# Patient Record
Sex: FEMALE | Race: WHITE | Employment: OTHER | URBAN - METROPOLITAN AREA
[De-identification: names, ages, dates, MRNs, and addresses within clinical notes are randomized per-mention and may not be internally consistent; named-entity substitution may affect disease eponyms.]

---

## 2018-08-23 ENCOUNTER — HOSPITAL ENCOUNTER (EMERGENCY)
Age: 73
Discharge: HOME | End: 2018-08-23
Attending: EMERGENCY MEDICINE
Payer: MEDICARE

## 2018-08-23 ENCOUNTER — APPOINTMENT (OUTPATIENT)
Dept: GENERAL RADIOLOGY | Age: 73
End: 2018-08-23
Attending: EMERGENCY MEDICINE
Payer: MEDICARE

## 2018-08-23 VITALS
DIASTOLIC BLOOD PRESSURE: 51 MMHG | HEIGHT: 64 IN | HEART RATE: 65 BPM | TEMPERATURE: 98.5 F | OXYGEN SATURATION: 98 % | SYSTOLIC BLOOD PRESSURE: 117 MMHG | RESPIRATION RATE: 21 BRPM | WEIGHT: 153 LBS | BODY MASS INDEX: 26.12 KG/M2

## 2018-08-23 DIAGNOSIS — G89.29 OTHER CHRONIC PAIN: ICD-10-CM

## 2018-08-23 DIAGNOSIS — R06.02 SHORTNESS OF BREATH: Primary | ICD-10-CM

## 2018-08-23 LAB
ALBUMIN SERPL-MCNC: 3.8 G/DL (ref 3.5–5)
ALBUMIN/GLOB SERPL: 1.2 {RATIO} (ref 1.1–2.2)
ALP SERPL-CCNC: 86 U/L (ref 45–117)
ALT SERPL-CCNC: 37 U/L (ref 12–78)
ANION GAP SERPL CALC-SCNC: 7 MMOL/L (ref 5–15)
AST SERPL-CCNC: 47 U/L (ref 15–37)
ATRIAL RATE: 58 BPM
BASOPHILS # BLD: 0 K/UL (ref 0–0.1)
BASOPHILS NFR BLD: 1 % (ref 0–1)
BILIRUB SERPL-MCNC: 0.7 MG/DL (ref 0.2–1)
BNP SERPL-MCNC: 217 PG/ML (ref 0–450)
BUN SERPL-MCNC: 25 MG/DL (ref 6–20)
BUN/CREAT SERPL: 19 (ref 12–20)
CALCIUM SERPL-MCNC: 8.8 MG/DL (ref 8.5–10.1)
CALCULATED P AXIS, ECG09: -10 DEGREES
CALCULATED R AXIS, ECG10: 36 DEGREES
CALCULATED T AXIS, ECG11: 14 DEGREES
CHLORIDE SERPL-SCNC: 102 MMOL/L (ref 97–108)
CO2 SERPL-SCNC: 26 MMOL/L (ref 21–32)
CREAT SERPL-MCNC: 1.31 MG/DL (ref 0.55–1.02)
DIAGNOSIS, 93000: NORMAL
DIFFERENTIAL METHOD BLD: ABNORMAL
EOSINOPHIL # BLD: 0.2 K/UL (ref 0–0.4)
EOSINOPHIL NFR BLD: 3 % (ref 0–7)
ERYTHROCYTE [DISTWIDTH] IN BLOOD BY AUTOMATED COUNT: 13.9 % (ref 11.5–14.5)
GLOBULIN SER CALC-MCNC: 3.2 G/DL (ref 2–4)
GLUCOSE SERPL-MCNC: 213 MG/DL (ref 65–100)
HCT VFR BLD AUTO: 28.2 % (ref 35–47)
HGB BLD-MCNC: 9.3 G/DL (ref 11.5–16)
IMM GRANULOCYTES # BLD: 0 K/UL (ref 0–0.04)
IMM GRANULOCYTES NFR BLD AUTO: 1 % (ref 0–0.5)
LYMPHOCYTES # BLD: 0.8 K/UL (ref 0.8–3.5)
LYMPHOCYTES NFR BLD: 16 % (ref 12–49)
MCH RBC QN AUTO: 29.6 PG (ref 26–34)
MCHC RBC AUTO-ENTMCNC: 33 G/DL (ref 30–36.5)
MCV RBC AUTO: 89.8 FL (ref 80–99)
MONOCYTES # BLD: 0.6 K/UL (ref 0–1)
MONOCYTES NFR BLD: 12 % (ref 5–13)
NEUTS SEG # BLD: 3.6 K/UL (ref 1.8–8)
NEUTS SEG NFR BLD: 68 % (ref 32–75)
NRBC # BLD: 0 K/UL (ref 0–0.01)
NRBC BLD-RTO: 0 PER 100 WBC
P-R INTERVAL, ECG05: 166 MS
PLATELET # BLD AUTO: 132 K/UL (ref 150–400)
PMV BLD AUTO: 11.4 FL (ref 8.9–12.9)
POTASSIUM SERPL-SCNC: 4.3 MMOL/L (ref 3.5–5.1)
PROT SERPL-MCNC: 7 G/DL (ref 6.4–8.2)
Q-T INTERVAL, ECG07: 412 MS
QRS DURATION, ECG06: 92 MS
QTC CALCULATION (BEZET), ECG08: 404 MS
RBC # BLD AUTO: 3.14 M/UL (ref 3.8–5.2)
SODIUM SERPL-SCNC: 135 MMOL/L (ref 136–145)
TROPONIN I SERPL-MCNC: 0.05 NG/ML
TROPONIN I SERPL-MCNC: <0.05 NG/ML
VENTRICULAR RATE, ECG03: 58 BPM
WBC # BLD AUTO: 5.3 K/UL (ref 3.6–11)

## 2018-08-23 PROCEDURE — 85025 COMPLETE CBC W/AUTO DIFF WBC: CPT | Performed by: EMERGENCY MEDICINE

## 2018-08-23 PROCEDURE — 93005 ELECTROCARDIOGRAM TRACING: CPT

## 2018-08-23 PROCEDURE — 84484 ASSAY OF TROPONIN QUANT: CPT | Performed by: EMERGENCY MEDICINE

## 2018-08-23 PROCEDURE — 83880 ASSAY OF NATRIURETIC PEPTIDE: CPT | Performed by: EMERGENCY MEDICINE

## 2018-08-23 PROCEDURE — 80053 COMPREHEN METABOLIC PANEL: CPT | Performed by: EMERGENCY MEDICINE

## 2018-08-23 PROCEDURE — 99285 EMERGENCY DEPT VISIT HI MDM: CPT

## 2018-08-23 PROCEDURE — 36415 COLL VENOUS BLD VENIPUNCTURE: CPT | Performed by: EMERGENCY MEDICINE

## 2018-08-23 PROCEDURE — 71046 X-RAY EXAM CHEST 2 VIEWS: CPT

## 2018-08-23 NOTE — ED NOTES
Bedside and Verbal shift change report given to [address] (oncoming nurse) by Gely Arzate RN (offgoing nurse). Report included the following information SBAR, Kardex, ED Summary, Intake/Output, MAR, Recent Results and Med Rec Status.

## 2018-08-23 NOTE — ED NOTES
This RN is signing up for the patient as the secondary nurse. If anything arises with the patient this RN will tell the primary nurse, physician, and write a note.

## 2018-08-23 NOTE — ED PROVIDER NOTES
EMERGENCY DEPARTMENT HISTORY AND PHYSICAL EXAM      Date: 8/23/2018  Patient Name: Mackenzie Jain    History of Presenting Illness     Chief Complaint   Patient presents with    Shortness of Breath     x 2 weeks. arrives via ems. History Provided By: Patient    HPI: Mackenzie Jain, 74 y.o. female with PMHx significant for CAD, DM, HTN, presents via EMS to the ED with cc of intermittent episodes of SOB x 2 weeks. Pt also c/o chronic neck and back pain which she feels is contributing to her shortness of breath. Pt reports chronic pain is due to arthritis. Pt reports recently being taken off of oxycodone recently by PCP and states she is now in constant chronic pain. Pt is currently taking 2 tablets of aleve daily x symptoms with signs of relief. Pt specifically states she is currently not experiencing SOB. Pt also states she is concerned of UTI, due to dysuria and \"milky\" colored urine. Pt also mentions endorsing 2 tablets of Lasix daily. Pt denies taking anticoagulants daily. Pt denies any exacerbating and alleviating factors. Pt denies any signs of CP, abdominal pain, fever, chills, N/V/D, chills, HA, estrogen use and any other associated symptoms. Chief Complaint: SOB  Duration: 2 weeks   Timing:  Intermittent  Location: N/A  Quality: N/A  Severity: N/A  Modifying Factors: Denies any exacerbating and alleviating factors. Associated Symptoms: Associated chronic neck, dysuria, back pain, but denies any other associated signs or symptoms     There are no other complaints, changes, or physical findings at this time.     PCP: Jorge Posey MD    Current Outpatient Prescriptions   Medication Sig Dispense Refill    glipiZIDE (GLUCOTROL) 5 mg tablet Take 1 pill twice daily for 7 days then resume old regimen of 1 tab daily 30 Tab 0    furosemide (LASIX) 20 mg tablet 1 tab every other day as needed as per cardiology 30 Tab 0    predniSONE (DELTASONE) 10 mg tablet Take 4 tabs daily for 3 days then  Take 3 tabs daily for 3 days then  Take 2 tabs daily for 3 days then  Take 1 tab daily for 3 days then STOP 30 Tab 0    Blood-Glucose Meter monitoring kit With testing strips & lancets 1 month supply 1 Kit 0    indomethacin (INDOCIN) 50 mg capsule Take 50 mg by mouth three (3) times daily as needed.  conjugated estrogens (PREMARIN) 0.625 mg/gram vaginal cream Insert 0.5 g into vagina daily as needed.  gemfibrozil (LOPID) 600 mg tablet Take 600 mg by mouth two (2) times a day.  nebivolol (BYSTOLIC) 5 mg tablet Take 5 mg by mouth daily.  cyclobenzaprine (FLEXERIL) 5 mg tablet Take 5 mg by mouth three (3) times daily as needed for Muscle Spasm(s).  naproxen (NAPROSYN) 500 mg tablet Take 500 mg by mouth two (2) times daily (with meals).  digoxin (LANOXIN) 0.25 mg tablet Take 0.25 mg by mouth daily.  diltiazem hcl 180 mg Tb24 Take 1 Tab by mouth daily.  HYDROcodone-acetaminophen (NORCO) 5-300 mg tablet Take 1 Tab by mouth every eight (8) hours as needed.  sitaGLIPtin (JANUVIA) 100 mg tablet Take 100 mg by mouth daily.  krill-omega-3-dha-epa-lipids (KRILL OIL) 094-14-41-25 mg cap Take 1 Cap by mouth daily.  buPROPion SR (WELLBUTRIN SR) 150 mg SR tablet Take 150 mg by mouth two (2) times a day.  aspirin 81 mg tablet Take 81 mg by mouth daily.  hydrOXYzine (ATARAX) 25 mg tablet Take 1 Tab by mouth every six (6) hours as needed for Anxiety. 20 Tab 0    triamcinolone acetonide (KENALOG) 0.1 % topical cream Apply  to affected area two (2) times a day. use thin layer       mometasone (ASMANEX TWISTHALER) 220 mcg (30 doses) inhalation capsule Take 1 Cap by inhalation two (2) times a day.  montelukast (SINGULAIR) 10 mg tablet Take 10 mg by mouth nightly.          Past History     Past Medical History:  Past Medical History:   Diagnosis Date    Asthma     CAD (coronary artery disease)     Diabetes ([facility] [address]     Hypertension     Other ill-defined conditions(799.89)     heart palpitations    Psychiatric disorder     depression       Past Surgical History:  Past Surgical History:   Procedure Laterality Date    HX GYN      hysterectomy    HX HEENT      tonsillectomy    HX OTHER SURGICAL      foot left       Family History:  History reviewed. No pertinent family history. Social History:  Social History   Substance Use Topics    Smoking status: Never Smoker    Smokeless tobacco: Never Used    Alcohol use No       Allergies: Allergies   Allergen Reactions    Albuterol Palpitations    Bactrim [Sulfamethoxazole-Trimethoprim] Not Reported This Time    Iodine Unknown (comments)    Shellfish Containing Products Unknown (comments)         Review of Systems   Review of Systems   Constitutional: Negative for chills, fatigue and fever. HENT: Negative for congestion, rhinorrhea and sore throat. Eyes: Negative for pain, discharge and visual disturbance. Respiratory: Positive for shortness of breath. Negative for cough, chest tightness and wheezing. Cardiovascular: Negative for chest pain, palpitations and leg swelling. Gastrointestinal: Negative for abdominal pain, constipation, diarrhea, nausea and vomiting. Genitourinary: Positive for dysuria. Negative for frequency and hematuria. Musculoskeletal: Positive for back pain and neck pain. Negative for arthralgias and myalgias. Skin: Negative for rash. Neurological: Negative for dizziness, weakness, light-headedness and headaches. Psychiatric/Behavioral: Negative. Physical Exam   Physical Exam   Constitutional: She is oriented to person, place, and time. She appears well-developed and well-nourished. No distress. HENT:   Head: Normocephalic and atraumatic. Eyes: EOM are normal. Right eye exhibits no discharge. Left eye exhibits no discharge. No scleral icterus. Neck: Normal range of motion. Neck supple. No tracheal deviation present.    Cardiovascular: Normal rate, regular rhythm, normal heart sounds and intact distal pulses. Exam reveals no gallop and no friction rub. No murmur heard. Pulses:       Dorsalis pedis pulses are 2+ on the right side, and 2+ on the left side. Posterior tibial pulses are 2+ on the right side, and 2+ on the left side. Pulmonary/Chest: Effort normal and breath sounds normal. No respiratory distress. She has no wheezes. She has no rales. Abdominal: Soft. She exhibits no distension. There is no tenderness. Musculoskeletal: Normal range of motion. She exhibits edema. 1+ B/L LE edema   Lymphadenopathy:     She has no cervical adenopathy. Neurological: She is alert and oriented to person, place, and time. No focal neuro deficits   Skin: Skin is warm and dry. No rash noted. Psychiatric: She has a normal mood and affect. Nursing note and vitals reviewed.       Diagnostic Study Results     Labs -     Recent Results (from the past 12 hour(s))   EKG, 12 LEAD, INITIAL    Collection Time: 08/23/18 12:52 PM   Result Value Ref Range    Ventricular Rate 58 BPM    Atrial Rate 58 BPM    P-R Interval 166 ms    QRS Duration 92 ms    Q-T Interval 412 ms    QTC Calculation (Bezet) 404 ms    Calculated P Axis -10 degrees    Calculated R Axis 36 degrees    Calculated T Axis 14 degrees    Diagnosis       Sinus bradycardia  When compared with ECG of 23-DEC-2013 17:12,  No significant change was found  Confirmed by Matt Hinds ([id number]) on 8/23/2018 0:06:74 PM     METABOLIC PANEL, COMPREHENSIVE    Collection Time: 08/23/18  1:51 PM   Result Value Ref Range    Sodium 135 (L) 136 - 145 mmol/L    Potassium 4.3 3.5 - 5.1 mmol/L    Chloride 102 97 - 108 mmol/L    CO2 26 21 - 32 mmol/L    Anion gap 7 5 - 15 mmol/L    Glucose 213 (H) 65 - 100 mg/dL    BUN 25 (H) 6 - 20 MG/DL    Creatinine 1.31 (H) 0.55 - 1.02 MG/DL    BUN/Creatinine ratio 19 12 - 20      GFR est AA 48 (L) >60 ml/min/1.73m2    GFR est non-AA 40 (L) >60 ml/min/1.73m2    Calcium 8.8 8.5 - 10.1 MG/DL Bilirubin, total 0.7 0.2 - 1.0 MG/DL    ALT (SGPT) 37 12 - 78 U/L    AST (SGOT) 47 (H) 15 - 37 U/L    Alk. phosphatase 86 45 - 117 U/L    Protein, total 7.0 6.4 - 8.2 g/dL    Albumin 3.8 3.5 - 5.0 g/dL    Globulin 3.2 2.0 - 4.0 g/dL    A-G Ratio 1.2 1.1 - 2.2     NT-PRO BNP    Collection Time: 08/23/18  1:51 PM   Result Value Ref Range    NT pro- 0 - 450 PG/ML   CBC WITH AUTOMATED DIFF    Collection Time: 08/23/18  1:51 PM   Result Value Ref Range    WBC 5.3 3.6 - 11.0 K/uL    RBC 3.14 (L) 3.80 - 5.20 M/uL    HGB 9.3 (L) 11.5 - 16.0 g/dL    HCT 28.2 (L) 35.0 - 47.0 %    MCV 89.8 80.0 - 99.0 FL    MCH 29.6 26.0 - 34.0 PG    MCHC 33.0 30.0 - 36.5 g/dL    RDW 13.9 11.5 - 14.5 %    PLATELET 184 (L) 775 - 400 K/uL    MPV 11.4 8.9 - 12.9 FL    NRBC 0.0 0  WBC    ABSOLUTE NRBC 0.00 0.00 - 0.01 K/uL    NEUTROPHILS 68 32 - 75 %    LYMPHOCYTES 16 12 - 49 %    MONOCYTES 12 5 - 13 %    EOSINOPHILS 3 0 - 7 %    BASOPHILS 1 0 - 1 %    IMMATURE GRANULOCYTES 1 (H) 0.0 - 0.5 %    ABS. NEUTROPHILS 3.6 1.8 - 8.0 K/UL    ABS. LYMPHOCYTES 0.8 0.8 - 3.5 K/UL    ABS. MONOCYTES 0.6 0.0 - 1.0 K/UL    ABS. EOSINOPHILS 0.2 0.0 - 0.4 K/UL    ABS. BASOPHILS 0.0 0.0 - 0.1 K/UL    ABS. IMM.  GRANS. 0.0 0.00 - 0.04 K/UL    DF AUTOMATED     TROPONIN I    Collection Time: 08/23/18  1:51 PM   Result Value Ref Range    Troponin-I, Qt. 0.05 (H) <0.05 ng/mL   EKG, 12 LEAD, SUBSEQUENT    Collection Time: 08/23/18  4:03 PM   Result Value Ref Range    Ventricular Rate 60 BPM    Atrial Rate 60 BPM    P-R Interval 192 ms    QRS Duration 94 ms    Q-T Interval 422 ms    QTC Calculation (Bezet) 422 ms    Calculated P Axis 81 degrees    Calculated R Axis 6 degrees    Calculated T Axis 10 degrees    Diagnosis       Normal sinus rhythm  Normal ECG  When compared with ECG of 23-AUG-2018 12:52,  MANUAL COMPARISON REQUIRED, DATA IS UNCONFIRMED     TROPONIN I    Collection Time: 08/23/18  4:11 PM   Result Value Ref Range    Troponin-I, Qt. <0.05 <0.05 ng/mL       Radiologic Studies -     CXR Results  (Last 48 hours)               08/23/18 1400  XR CHEST PA LAT Final result    Impression:  IMPRESSION: Bronchitis versus asthma. No infiltrate                       Narrative:  EXAM:  XR CHEST PA LAT       INDICATION:   shortness of breath, intermittent, x 2 weeks       COMPARISON: 2010. FINDINGS: PA and lateral radiographs of the chest demonstrate no infiltrate. There is a stable linear scar along the left major fissure. Peribronchial   cuffing is present on the right. The cardiac and mediastinal contours and   pulmonary vascularity are remarkable for moderate tortuosity of the descending   aorta. Moderate spondylitic changes are present in the midthoracic spine. Medical Decision Making   I am the first provider for this patient. I reviewed the vital signs, available nursing notes, past medical history, past surgical history, family history and social history. Vital Signs-Reviewed the patient's vital signs. Patient Vitals for the past 12 hrs:   Temp Pulse Resp BP SpO2   08/23/18 1630 - 65 21 117/51 98 %   08/23/18 1600 - 62 22 (!) 112/34 98 %   08/23/18 1330 - (!) 55 15 123/40 99 %   08/23/18 1300 98.5 °F (36.9 °C) (!) 55 16 133/45 99 %       Pulse Oximetry Analysis - 99% on RA    Cardiac Monitor:   Rate: 58 bpm  Rhythm: Sinus Bradycardia     EKG interpretation: (Preliminary) 1252  Rhythm: sinus bradycardia; and regular . Rate (approx.): 58 bpm; Axis: normal; NH interval: normal; QRS interval: normal ; ST/T wave: normal; Other findings: No other findings. Written by JAMES Du, as dictated by Nivia Miller MD.    Repeated EKG interpretation: (Preliminary) 3803  Rhythm: Normal Sinus Rhythm; and regular . Rate (approx.): 60 bpm; Axis: normal; NH interval: normal; QRS interval: normal ; ST/T wave: normal; Other findings: No other findings.   Written by JAMES Du, as dictated by Luisa De León Rory Calloway MD.    Records Reviewed: Nursing Notes, Old Medical Records, Previous electrocardiograms, Ambulance Run Sheet, Previous Radiology Studies and Previous Laboratory Studies    Provider Notes (Medical Decision Making):   DDx: Medication side effect, heart failure, pleural effusion, PNA, ACS, UTI, anxiety, PE    Disposition: Patient is a 75 yo female who presents to ED with intermittent dyspnea x 2 weeks. She reports recently being taken off of pain medication and feels her pain is contributing to her dyspnea. She denies chest pain and current dyspnea. CXR negative for pulmonary edema, pna. Troponin negative x 2. Low suspicion for PE (Well's score 0). Vital signs stable. Will discharge with PCP follow up. ED Course:   Initial assessment performed. The patients presenting problems have been discussed, and they are in agreement with the care plan formulated and outlined with them. I have encouraged them to ask questions as they arise throughout their visit. PROGRESS NOTE:  5:30 PM  Patient reports she is feeling better, denies current dyspnea and CP. VSS. Will discharge with PCP follow up. Discussed results, prescriptions and follow up plan with patient. Provided customary return to ED instructions. Patient expressed understanding. Esvin Alvarado MD    Critical Care Time:   0 minutes    Disposition:  Discharge Note:  5:32 PM  The pt is ready for discharge. The pt's signs, symptoms, diagnosis, and discharge instructions have been discussed and pt has conveyed their understanding. The pt is to follow up as recommended or return to ER should their symptoms worsen. Plan has been discussed and pt is in agreement. PLAN:  1. Current Discharge Medication List        2.    Follow-up Information     Follow up With Details Comments Contact Info    Miguel Pelayo MD In 2 days  [address] [place] [address] [address] [phone]      [facility] EMERGENCY DEPT  As needed, If symptoms worsen [address] [facility]  [phone]        Return to ED if worse     Diagnosis     Clinical Impression:   1. Shortness of breath    2. Other chronic pain        Attestations: This note is prepared by Rony Mejia, acting as Scribe for Corina Almanzar MD.    Corina Almanzar MD: The scribe's documentation has been prepared under my direction and personally reviewed by me in its entirety.  I confirm that the note above accurately reflects all work, treatment, procedures, and medical decision making performed by me

## 2018-08-23 NOTE — DISCHARGE INSTRUCTIONS

## 2018-08-23 NOTE — ED NOTES
Assumed care from triage. Patient comes to the ED after being short of breath this morning, but states that she believes she had a panic attack. Patient has no complaints except her arthritis is acting up badly.

## 2018-08-24 LAB
ATRIAL RATE: 60 BPM
CALCULATED P AXIS, ECG09: 81 DEGREES
CALCULATED R AXIS, ECG10: 6 DEGREES
CALCULATED T AXIS, ECG11: 10 DEGREES
DIAGNOSIS, 93000: NORMAL
P-R INTERVAL, ECG05: 192 MS
Q-T INTERVAL, ECG07: 422 MS
QRS DURATION, ECG06: 94 MS
QTC CALCULATION (BEZET), ECG08: 422 MS
VENTRICULAR RATE, ECG03: 60 BPM

## 2018-10-03 ENCOUNTER — HOSPITAL ENCOUNTER (OUTPATIENT)
Dept: NON INVASIVE DIAGNOSTICS | Age: 73
Discharge: HOME | End: 2018-10-03
Attending: INTERNAL MEDICINE
Payer: MEDICARE

## 2018-10-03 DIAGNOSIS — R55 SYNCOPE: ICD-10-CM

## 2018-10-03 PROCEDURE — 93225 XTRNL ECG REC<48 HRS REC: CPT

## 2018-10-05 NOTE — PROCEDURES
[address] [facility]    Gunnar Vela  MR#: [id number]  : 1945  ACCOUNT #: [de-identified]   DATE OF SERVICE: 10/03/2018    PROCEDURE:  A 24-hour Holter monitor. INDICATION:  Syncope. A 24-hour Holter monitor was carried out between 10/03 and 10/04. During the recording the patient had 4 premature ventricular contractions and several atrial premature contractions, no pauses. Minimum heart rate 46 beats per minute, maximum heart rate 108 beats per minute. Sinus bradycardia was observed with rates of 46. The diary was returned and during the recording the patient recorded no symptoms. SUMMARY: Holter monitor done for complaint of syncope reveals the followin. Regular sinus rhythm. 2.  Rare premature ventricular contractions. 3.  Several atrial premature contractions. 4.  No significant pauses. 5.  No tachycardia.       Miles Fiscehr MD       SCT / SN  D: 10/04/2018 16:06     T: 10/04/2018 21:43  JOB #: [id number]  CC: Rocky Franco MD  CC: Danica Anderson MD

## 2018-12-07 ENCOUNTER — OFFICE VISIT (OUTPATIENT)
Dept: NEUROLOGY | Age: 73
End: 2018-12-07

## 2018-12-07 VITALS
OXYGEN SATURATION: 95 % | HEART RATE: 81 BPM | SYSTOLIC BLOOD PRESSURE: 110 MMHG | DIASTOLIC BLOOD PRESSURE: 54 MMHG | BODY MASS INDEX: 24.03 KG/M2 | WEIGHT: 140 LBS

## 2018-12-07 DIAGNOSIS — M54.12 CERVICAL RADICULOPATHY: Primary | ICD-10-CM

## 2018-12-07 DIAGNOSIS — G25.0 ESSENTIAL TREMOR: ICD-10-CM

## 2018-12-07 RX ORDER — LOSARTAN POTASSIUM 50 MG/1
25 TABLET ORAL DAILY
COMMUNITY

## 2018-12-07 RX ORDER — METHOCARBAMOL 750 MG/1
TABLET, FILM COATED ORAL
Refills: 1 | COMMUNITY
Start: 2018-10-23 | End: 2020-08-10

## 2018-12-07 NOTE — PROGRESS NOTES
NEUROLOGY CLINIC NOTE    Patient ID:  Rachael Mckeon  [id number]  89 y.o.  1945    Date of Consultation:  December 7, 2018    Reason for Consultation:  Left neck, arm and shoulder    Chief Complaint   Patient presents with    New Patient    Arm Pain     left        History of Present Illness:     Patient Active Problem List    Diagnosis Date Noted    CHF (congestive heart failure) ([facility] [address] 12/23/2013     Past Medical History:   Diagnosis Date    Asthma     CAD (coronary artery disease)     Diabetes ([facility] [address]     Hypertension     Other ill-defined conditions(799.89)     heart palpitations    Psychiatric disorder     depression      Past Surgical History:   Procedure Laterality Date    HX GYN      hysterectomy    HX HEENT      tonsillectomy    HX OTHER SURGICAL      foot left      Prior to Admission medications    Medication Sig Start Date End Date Taking? Authorizing Provider   losartan (COZAAR) 50 mg tablet Take  by mouth daily. Yes Provider, Historical   methocarbamol (ROBAXIN) 750 mg tablet TAKE 1 TABLET BY MOUTH 3 TIMES A DAY AS NEEDED FOR SPASMS 10/23/18  Yes Provider, Historical   glipiZIDE (GLUCOTROL) 5 mg tablet Take 1 pill twice daily for 7 days then resume old regimen of 1 tab daily 12/26/13  Yes Camilo MONTIEL MD   furosemide (LASIX) 20 mg tablet 1 tab every other day as needed as per cardiology 12/26/13  Yes Edgardo Hernandez MD   Blood-Glucose Meter monitoring kit With testing strips & lancets 1 month supply 12/26/13  Yes Edgardo Hernandez MD   gemfibrozil (LOPID) 600 mg tablet Take 600 mg by mouth two (2) times a day. Yes Other, MD Junior   nebivolol (BYSTOLIC) 5 mg tablet Take 5 mg by mouth daily. Yes Other, MD Junior   diltiazem hcl 180 mg Tb24 Take 1 Tab by mouth daily. Yes Provider, Historical   sitaGLIPtin (JANUVIA) 100 mg tablet Take 100 mg by mouth daily. Yes Provider, Historical   buPROPion SR (WELLBUTRIN SR) 150 mg SR tablet Take 150 mg by mouth two (2) times a day. Yes Junior Mcconnell MD   mometasone (ASMANEX TWISTHALER) 220 mcg (30 doses) inhalation capsule Take 1 Cap by inhalation two (2) times a day. Yes Junior Mcconnell MD   montelukast (SINGULAIR) 10 mg tablet Take 10 mg by mouth nightly. Yes Junior Mcconnell MD   predniSONE (DELTASONE) 10 mg tablet Take 4 tabs daily for 3 days then  Take 3 tabs daily for 3 days then  Take 2 tabs daily for 3 days then  Take 1 tab daily for 3 days then STOP 12/26/13   Guevara Jones MD   indomethacin (INDOCIN) 50 mg capsule Take 50 mg by mouth three (3) times daily as needed. Junior Mcconnell MD   conjugated estrogens (PREMARIN) 0.625 mg/gram vaginal cream Insert 0.5 g into vagina daily as needed. Junior Mcconnell MD   cyclobenzaprine (FLEXERIL) 5 mg tablet Take 5 mg by mouth three (3) times daily as needed for Muscle Spasm(s). Junior Mcconnell MD   naproxen (NAPROSYN) 500 mg tablet Take 500 mg by mouth two (2) times daily (with meals). Junior Mcconnell MD   digoxin (LANOXIN) 0.25 mg tablet Take 0.25 mg by mouth daily. Provider, Historical   HYDROcodone-acetaminophen (NORCO) 5-300 mg tablet Take 1 Tab by mouth every eight (8) hours as needed. Provider, Historical   krill-omega-3-dha-epa-lipids (KRILL OIL) 599-39-11-50 mg cap Take 1 Cap by mouth daily. Provider, Historical   aspirin 81 mg tablet Take 81 mg by mouth daily. Junior Mcconnell MD   hydrOXYzine (ATARAX) 25 mg tablet Take 1 Tab by mouth every six (6) hours as needed for Anxiety. 1/2/13   Benita Mccabe MD   triamcinolone acetonide (KENALOG) 0.1 % topical cream Apply  to affected area two (2) times a day.  use thin layer     Junior Mcconnell MD     Allergies   Allergen Reactions    Albuterol Palpitations    Bactrim [Sulfamethoxazole-Trimethoprim] Not Reported This Time    Iodine Unknown (comments)    Shellfish Containing Products Unknown (comments)      Social History     Tobacco Use    Smoking status: Never Smoker    Smokeless tobacco: Never Used   Substance Use Topics    Alcohol use: No      Family History   Problem Relation Age of Onset    Cancer Father     Stroke Father     Heart Disease Paternal Grandfather        Subjective:      Shira Tyson is a 74 y.o. [facility] with a history of diabetes, hypertension, depression, CAD, congestive heart failure and asthma who is here for further evaluation of her chronic left neck and arm pain. Per patient and  condition is been ongoing for the past 3-4 months. No known triggers. Abrupt onset of left neck and shoulder pain that radiates down the left arm. Mainly involving the left upper arm around the deltoid area and anterior forearm. It does not radiate down to the hands. Sharp pain and at its worst can be an 8/10. It can last for several minutes to half an hour. Looking down or flexing her head towards her chest precipitates the pain at times. No worsening factors. Lifting her arm above the shoulder seems to help. No overt weakness. No loss of muscle block. Occurs every day but she still has pain free periods. Patient takes Tylenol with no clear benefit. Methocarbamol 750 mg 3 times daily offers no benefit as well. Oxycodone offers relief. Patient also mentions history of tremors for years. Mainly involving the hands. Not any worse. Does not affect her activities of daily living. Review of outside records reveal:  Patient was seen by Dr. Megan Fan (orthopaedic) last 6/15/2017 for neck and back pain. Diagnosed with cervicalgia, thoracic and cervical spondylosis, right sciatica. Patient was given home exercise program.  Patient was also prescribed tizanidine 4 mg every 8 hours as needed for muscle spasms. Patient was last seen 9/26/2018 for neck and lower back pain. Note mentions mainly right-sided back pain. Patient was treated with physical therapy, chiropractic manipulation and pain medication. 9/10 pain. History of falls.   Cervical spine x-ray done 9/26/2018 revealed mild degenerative changes throughout with severe disc collapse at C5-6. No fracture or dislocation. Lumbar x-ray done 9/26/2018 revealed degenerative changes throughout with asymmetric disc collapse at L3-4 and L4-5. There is a grade 1 spondylolisthesis at L4-5 that appears to be stable. There is mild compression deformity at L1 age indeterminate but new since December 2016. Dr. Shukri Pratt discussed kyphoplasty as well as obtaining an MRI. Patient was also provided a list of pain management specialist.    Outside reports reviewed: office notes, radiology reports. Review of Systems:    A comprehensive review of systems was performed:   Constitutional: positive for fatigue   Eyes: positive for none  Ears, nose, mouth, throat, and face: positive for none  Respiratory: positive for shortness of breath   Cardiovascular: positive for none  Gastrointestinal: positive for none  Genitourinary: positive for none  Integument/breast: positive for none  Hematologic/lymphatic: positive for none  Musculoskeletal: positive for joint pain, myalgia, weakness   Neurological: positive for falls   Behavioral/Psych: positive for none  Endocrine: positive for none  Allergic/Immunologic: positive for none      Objective:     Visit Vitals  /54   Pulse 81   Wt 140 lb (63.5 kg)   SpO2 95%   BMI 24.03 kg/m²     3/10 left arm pain    PHYSICAL EXAM:    General Appearance: Alert, patient appears stated age. General:  Well developed, well nourished, patient in no apparent distress. Head/Face: The head is normocephalic and atraumatic. Eyes: Conjunctivae appear normal. Sclera appear normal and non-icteric. Nose (and Sinus):   No abnormality of the nose or sinuses is noted. Oral:   Throat is clear. Lymphatics:  No lymphadenopathy in the neck/head. Neck and Thyroid:   No bruits noted in the neck. Respiratory:  Lungs clear to auscultation. Cardiovascular:  Palpation and auscultation: regular rate and rhythm.    Extremity: No joint swelling, erythema or pedal edema. NEUROLOGICAL EXAM:    Appearance: The patient is well developed, well nourished, provides a coherent history and is in no acute distress. Mental Status: Oriented to time, place and person. Fluent, no aphasia or dysarthria. Mood and affect appropriate. Cranial Nerves:   Intact visual fields. ANDRA, EOM's full, no nystagmus, no ptosis. Facial sensation is normal. Corneal reflexes are intact. Facial movement is symmetric. Hearing is normal bilaterally. Palate is midline with normal elevation. Sternocleidomastoid and trapezius muscles are normal. Tongue is midline. Motor:  5/5 strength in upper and lower proximal and distal muscles. Normal bulk and tone. No fasciculations. No pronator drift. No cogwheel rigidity. Reflexes:   Deep tendon reflexes 1+/4 and symmetrical. Downgoing toes. Sensory:   Normal to cold, pinprick and vibration except decrease PP bilateral C5 dermatome and decrease cold and vibration right foot with intact PP. Gait:  Steady. No Romberg. Tremor:   Mild intentional and postural UE tremors noted. No resting pill rolling tremors. No bradykinesia. Cerebellar:  Intact FTN/JESSICA/HTS. Neurovascular:  No carotid bruits. Imaging  Cervical and lumbar x-rays    Assessment:   Cervical radiculopathy  Essential tremors    Plan:   Neurological examination reveals decrease pinprick at bilateral C5 dermatomal levels. History and complaint is consistent with at least a left C5 to possibly C6 radiculopathy. Previous cervical x-ray revealed severe disc collapse at C5-C6. Cervical MRI without contrast was ordered to assess for severity of neuroforaminal narrowing and possible spinal stenosis at C5-C6 level. EMG/NCS of the left upper extremity was ordered to further assess extent of radiculopathy as well as presence of actual nerve or muscle damage. Further intervention will be done pending results of the testing.     Exam also reveals mild postural and intentional upper extremity tremors. No evidence of resting tremors, cogwheel rigidity, bradykinesia, masked faces, stooped posture or shuffling gait. No findings typically seen in Parkinson's disease. Findings are more consistent with essential or benign tremors. Patient was reassured. No treatment necessary at this time given that it does not affect her activities of daily living. Monitor for now. All questions and concerns were answered. Visit lasted 50 minutes.   Greater than 50% was spent reviewing her outside medical records from orthopedics as well as cervical and lumbar x-rays done, discussion about her condition, etiology and prognosis, need for further diagnostic testing to look for severity and possible surgical intervention, need for cervical MRI, need for EMG/NCS, refrain from any strenuous activity, fall precaution

## 2018-12-07 NOTE — PATIENT INSTRUCTIONS
A Healthy Lifestyle: Care Instructions  Your Care Instructions    A healthy lifestyle can help you feel good, stay at a healthy weight, and have plenty of energy for both work and play. A healthy lifestyle is something you can share with your whole family. A healthy lifestyle also can lower your risk for serious health problems, such as high blood pressure, heart disease, and diabetes. You can follow a few steps listed below to improve your health and the health of your family. Follow-up care is a key part of your treatment and safety. Be sure to make and go to all appointments, and call your doctor if you are having problems. It's also a good idea to know your test results and keep a list of the medicines you take. How can you care for yourself at home? · Do not eat too much sugar, fat, or fast foods. You can still have dessert and treats now and then. The goal is moderation. · Start small to improve your eating habits. Pay attention to portion sizes, drink less juice and soda pop, and eat more fruits and vegetables. ? Eat a healthy amount of food. A 3-ounce serving of meat, for example, is about the size of a deck of cards. Fill the rest of your plate with vegetables and whole grains. ? Limit the amount of soda and sports drinks you have every day. Drink more water when you are thirsty. ? Eat at least 5 servings of fruits and vegetables every day. It may seem like a lot, but it is not hard to reach this goal. A serving or helping is 1 piece of fruit, 1 cup of vegetables, or 2 cups of leafy, raw vegetables. Have an apple or some carrot sticks as an afternoon snack instead of a candy bar. Try to have fruits and/or vegetables at every meal.  · Make exercise part of your daily routine. You may want to start with simple activities, such as walking, bicycling, or slow swimming. Try to be active 30 to 60 minutes every day. You do not need to do all 30 to 60 minutes all at once.  For example, you can exercise 3 times a day for 10 or 20 minutes. Moderate exercise is safe for most people, but it is always a good idea to talk to your doctor before starting an exercise program.  · Keep moving. Amanda Penalozan the lawn, work in the garden, or [employer]. Take the stairs instead of the elevator at work. · If you smoke, quit. People who smoke have an increased risk for heart attack, stroke, cancer, and other lung illnesses. Quitting is hard, but there are ways to boost your chance of quitting tobacco for good. ? Use nicotine gum, patches, or lozenges. ? Ask your doctor about stop-smoking programs and medicines. ? Keep trying. In addition to reducing your risk of diseases in the future, you will notice some benefits soon after you stop using tobacco. If you have shortness of breath or asthma symptoms, they will likely get better within a few weeks after you quit. · Limit how much alcohol you drink. Moderate amounts of alcohol (up to 2 drinks a day for men, 1 drink a day for women) are okay. But drinking too much can lead to liver problems, high blood pressure, and other health problems. Family health  If you have a family, there are many things you can do together to improve your health. · Eat meals together as a family as often as possible. · Eat healthy foods. This includes fruits, vegetables, lean meats and dairy, and whole grains. · Include your family in your fitness plan. Most people think of activities such as jogging or tennis as the way to fitness, but there are many ways you and your family can be more active. Anything that makes you breathe hard and gets your heart pumping is exercise. Here are some tips:  ? Walk to do errands or to take your child to school or the bus.  ? Go for a family bike ride after dinner instead of watching TV. Where can you learn more? Go to http://eder-zuleika.info/. Enter O498 in the search box to learn more about \"A Healthy Lifestyle: Care Instructions. \"  Current as of: December 7, 2017  Content Version: 11.8  © 6359-8270 [employer]. Care instructions adapted under license by [employer] (which disclaims liability or warranty for this information). If you have questions about a medical condition or this instruction, always ask your healthcare professional. [address] any warranty or liability for your use of this information. Office Policies        Phone calls/patient messages:    · Please allow up to 24 hours for someone in the office to contact you about your call or message. Be mindful your provider may be out of the office or your message may require further review. We encourage you to use [employer] for your messages as this is a faster, more efficient way to communicate with our office           Medication Refills:    · Prescription medications require up to 48 business hours to process. We encourage you to use [employer] for your refills. · For controlled medications: Please allow up to 72 business hours to process. Certain medications may require you to  a written prescription at our office. · NO narcotic/controlled medications will be prescribed after 4pm Monday through Friday or on weekends                Please note our office is moving to a new location at the end of December 2018. It will be at:  [address] [place] [address] [address] [place] [address], [address] [place] [address]       Pinched Nerve in the Neck: Care Instructions  Your Care Instructions  A pinched nerve in the neck happens when a vertebra or disc in the upper part of your spine is damaged. This damage can happen because of an injury. Or it can just happen with age. The changes caused by the damage may put pressure on a nearby nerve root, pinching it. This causes symptoms such as sharp pain in your neck, shoulder, arm, hand, or back. You may also have tingling or numbness. Sometimes it makes your arm weaker.  The symptoms are usually worse when you turn your head or strain your neck. For many people, the symptoms get better over time and finally go away. Early treatment usually includes medicines for pain and swelling. Sometimes physical therapy and special exercises may help. Follow-up care is a key part of your treatment and safety. Be sure to make and go to all appointments, and call your doctor if you are having problems. It's also a good idea to know your test results and keep a list of the medicines you take. How can you care for yourself at home? · Be safe with medicines. Read and follow all instructions on the label. ? If the doctor gave you a prescription medicine for pain, take it as prescribed. ? If you are not taking a prescription pain medicine, ask your doctor if you can take an over-the-counter medicine. · Try using a heating pad on a low or medium setting for 15 to 20 minutes every 2 or 3 hours. Try a warm shower in place of one session with the heating pad. You can also buy single-use heat wraps that last up to 8 hours. · You can also try an ice pack for 10 to 15 minutes every 2 to 3 hours. There isn't strong evidence that either heat or ice will help. But you can try them to see if they help you. · Don't spend too long in one position. Take short breaks to move around and change positions. · Wear a seat belt and shoulder harness when you are in a car. · Sleep with a pillow under your head and neck that keeps your neck straight. · If you were given a neck brace (cervical collar) to limit neck motion, wear it as instructed for as many days as your doctor tells you to. Do not wear it longer than you were told to. Wearing a brace for too long can lead to neck stiffness and can weaken the neck muscles. · Follow your doctor's instructions for gentle neck-stretching exercises. · Do not smoke. Smoking can slow healing of your discs. If you need help quitting, talk to your doctor about stop-smoking programs and medicines.  These can increase your chances of quitting for good. · Avoid strenuous work or exercise until your doctor says it is okay. When should you call for help? Call 911 anytime you think you may need emergency care. For example, call if:    · You are unable to move an arm or a leg at all.   [facility] your doctor now or seek immediate medical care if:    · You have new or worse symptoms in your arms, legs, chest, belly, or buttocks. Symptoms may include:  ? Numbness or tingling. ? Weakness. ? Pain.     · You lose bladder or bowel control.    Watch closely for changes in your health, and be sure to contact your doctor if:    · You are not getting better as expected. Where can you learn more? Go to http://eder-zuleika.info/. Enter Z890 in the search box to learn more about \"Pinched Nerve in the Neck: Care Instructions. \"  Current as of: November 29, 2017  Content Version: 11.8  © 2723-4550 [employer]. Care instructions adapted under license by [employer] (which disclaims liability or warranty for this information). If you have questions about a medical condition or this instruction, always ask your healthcare professional. [address] any warranty or liability for your use of this information.

## 2018-12-07 NOTE — PROGRESS NOTES
Patient stated she's having severe pain for years. Patient stated she is having muscle spasm all over as well as left arm pain.

## 2018-12-11 ENCOUNTER — OFFICE VISIT (OUTPATIENT)
Dept: NEUROLOGY | Age: 73
End: 2018-12-11

## 2018-12-11 VITALS
SYSTOLIC BLOOD PRESSURE: 104 MMHG | BODY MASS INDEX: 24.92 KG/M2 | HEIGHT: 64 IN | HEART RATE: 70 BPM | DIASTOLIC BLOOD PRESSURE: 42 MMHG | OXYGEN SATURATION: 98 % | WEIGHT: 146 LBS

## 2018-12-11 DIAGNOSIS — M54.12 CERVICAL RADICULOPATHY: Primary | ICD-10-CM

## 2018-12-11 RX ORDER — BACLOFEN 10 MG/1
10 TABLET ORAL 3 TIMES DAILY
Qty: 90 TAB | Refills: 0 | Status: SHIPPED | OUTPATIENT
Start: 2018-12-11 | End: 2019-01-07 | Stop reason: SDUPTHER

## 2018-12-11 RX ORDER — LORAZEPAM 1 MG/1
1 TABLET ORAL ONCE
Qty: 2 TAB | Refills: 0 | Status: SHIPPED | OUTPATIENT
Start: 2018-12-11 | End: 2018-12-11

## 2018-12-11 RX ORDER — LINACLOTIDE 145 UG/1
CAPSULE, GELATIN COATED ORAL
Refills: 0 | COMMUNITY
Start: 2018-11-16 | End: 2020-08-10

## 2018-12-11 RX ORDER — LEVALBUTEROL TARTRATE 45 UG/1
AEROSOL, METERED ORAL
Refills: 3 | COMMUNITY
Start: 2018-11-16

## 2018-12-11 NOTE — PROCEDURES
[facility] at [address] [place] [address]        Tel: [phone] ? Fax: [phone]    ELECTRODIAGNOSTIC REPORT      Test Date:  2018    Patient: Basilio Rosales : 1945 Physician: Yahir Piña   ID#: [id number] SEX: Female Ref. Phys: Yahir Piña     Patient History / Exam:  Left neck, shoulder and upper arm pain    EMG & NCV Findings:  Sensory and motor nerve conduction studies (as indicated in the tables) were within reference of normal.     Disposable concentric needle EMG (as indicated in the table) was normal except for irritability with mild neuropathic recruitment changes left deltoid and brachioradialis muscles. Mild denervation changes left mid cervical paraspinal muscle. Impression: This study is abnormal. There is electrodiagnostic evidence of a mild left C5 radiculopathy. There is no evidence of an entrapment neuropathy, generalized neuropathy or myopathy at this time. MRI correlate has already been ordered. Trial of Baclofen for symptomatic relief.     Yahir Piña MD    Nerve Conduction Studies  Anti Sensory Summary Table     Stim Site NR Peak (ms) Norm Peak (ms) P-T Amp (µV) Norm P-T Amp Site1 Site2 Dist (cm)   Left Median Anti Sensory (2nd Digit)   Wrist    3.9 <4 17.4 >13 Wrist 2nd Digit 14.0   Left Radial Anti Sensory (Base 1st Digit)   Wrist    2.2 <2.8 21.8 >11 Wrist Base 1st Digit 10.0   Left Ulnar Anti Sensory (5th Digit)   Wrist    3.6 <4.0 18.1 >9 Wrist 5th Digit 14.0     Motor Summary Table     Stim Site NR Onset (ms) Norm Onset (ms) O-P Amp (mV) Norm O-P Amp P-T Amp (mV) Site1 Site2 Dist (cm) Kiko (m/s)   Left Median Motor (Abd Poll Brev)   Wrist    3.8 <4.5 5.2 >4.1 9.0 Wrist Abd Poll Brev 8.0    Elbow    8.0  5.4  9.5 Elbow Wrist 21.5 51   Left Ulnar Motor (Abd Dig Minimi)   Wrist    2.7 <3.1 8.2 >7.0 15.0 Wrist Abd Dig Minimi 8.0    B Elbow    5.8  7.1  13.7 B Elbow Wrist 17.5 56   A Elbow    7.3  7.1  13.4 A Elbow B Elbow 10.0 67       EMG     Side Muscle Nerve Root Ins Act Fibs Psw Recrt Duration Amp Poly Comment   Left Deltoid Axillary C5-6 Incr Nml Nml Reduced Incr Incr 2+    Left Triceps Radial C6-7-8 Nml Nml Nml Nml Nml Nml Nml    Left Biceps Musculocut C5-6 Nml Nml Nml Nml Nml Nml Nml    Left BrachioRad Radial C5-6 Incr Nml Nml Reduced Incr Incr 2+    Left PronatorTeres Median C6-7 Nml Nml Nml Nml Nml Nml Nml    Left 1stDorInt Ulnar C8-T1 Nml Nml Nml Nml Nml Nml Nml    Left Lower Cerv Parasp Rami C7,T1 Nml Nml Nml Nml Nml Nml Nml    Left Mid Cerv Parasp Rami C5,6 Incr 1+ 1+ Nml Nml Nml Nml    Left Upper Cerv Parasp Rami C3,4 Nml Nml Nml Nml Nml Nml Nml      Waveforms:

## 2018-12-11 NOTE — PATIENT INSTRUCTIONS
A Healthy Lifestyle: Care Instructions  Your Care Instructions    A healthy lifestyle can help you feel good, stay at a healthy weight, and have plenty of energy for both work and play. A healthy lifestyle is something you can share with your whole family. A healthy lifestyle also can lower your risk for serious health problems, such as high blood pressure, heart disease, and diabetes. You can follow a few steps listed below to improve your health and the health of your family. Follow-up care is a key part of your treatment and safety. Be sure to make and go to all appointments, and call your doctor if you are having problems. It's also a good idea to know your test results and keep a list of the medicines you take. How can you care for yourself at home? · Do not eat too much sugar, fat, or fast foods. You can still have dessert and treats now and then. The goal is moderation. · Start small to improve your eating habits. Pay attention to portion sizes, drink less juice and soda pop, and eat more fruits and vegetables. ? Eat a healthy amount of food. A 3-ounce serving of meat, for example, is about the size of a deck of cards. Fill the rest of your plate with vegetables and whole grains. ? Limit the amount of soda and sports drinks you have every day. Drink more water when you are thirsty. ? Eat at least 5 servings of fruits and vegetables every day. It may seem like a lot, but it is not hard to reach this goal. A serving or helping is 1 piece of fruit, 1 cup of vegetables, or 2 cups of leafy, raw vegetables. Have an apple or some carrot sticks as an afternoon snack instead of a candy bar. Try to have fruits and/or vegetables at every meal.  · Make exercise part of your daily routine. You may want to start with simple activities, such as walking, bicycling, or slow swimming. Try to be active 30 to 60 minutes every day. You do not need to do all 30 to 60 minutes all at once.  For example, you can exercise 3 times a day for 10 or 20 minutes. Moderate exercise is safe for most people, but it is always a good idea to talk to your doctor before starting an exercise program.  · Keep moving. Darinel Bryantle the lawn, work in the garden, or [employer]. Take the stairs instead of the elevator at work. · If you smoke, quit. People who smoke have an increased risk for heart attack, stroke, cancer, and other lung illnesses. Quitting is hard, but there are ways to boost your chance of quitting tobacco for good. ? Use nicotine gum, patches, or lozenges. ? Ask your doctor about stop-smoking programs and medicines. ? Keep trying. In addition to reducing your risk of diseases in the future, you will notice some benefits soon after you stop using tobacco. If you have shortness of breath or asthma symptoms, they will likely get better within a few weeks after you quit. · Limit how much alcohol you drink. Moderate amounts of alcohol (up to 2 drinks a day for men, 1 drink a day for women) are okay. But drinking too much can lead to liver problems, high blood pressure, and other health problems. Family health  If you have a family, there are many things you can do together to improve your health. · Eat meals together as a family as often as possible. · Eat healthy foods. This includes fruits, vegetables, lean meats and dairy, and whole grains. · Include your family in your fitness plan. Most people think of activities such as jogging or tennis as the way to fitness, but there are many ways you and your family can be more active. Anything that makes you breathe hard and gets your heart pumping is exercise. Here are some tips:  ? Walk to do errands or to take your child to school or the bus.  ? Go for a family bike ride after dinner instead of watching TV. Where can you learn more? Go to http://eder-zuleika.info/. Enter S761 in the search box to learn more about \"A Healthy Lifestyle: Care Instructions. \"  Current as of: December 7, 2017  Content Version: 11.8  © 7022-3556 Healthwise, [employer]. Care instructions adapted under license by [employer] (which disclaims liability or warranty for this information). If you have questions about a medical condition or this instruction, always ask your healthcare professional. [address] any warranty or liability for your use of this information. Office Policies    o Phone calls/patient messages:  Please allow up to 24 hours for someone in the office to contact you about your call or message. Be mindful your provider may be out of the office or your message may require further review. We encourage you to use [facility] for your messages as this is a faster, more efficient way to communicate with our office    o Medication Refills:  Prescription medications require up to 48 business hours to process. We encourage you to use [facility] for your refills. For controlled medications: Please allow up to 72 business hours to process. Certain medications may require you to  a written prescription at our office. NO narcotic/controlled medications will be prescribed after 4pm Monday through Friday or on weekends    o Form/Paperwork Completion:  We ask that you allow 7-14 business days. You may also download your forms to [facility] to have your doctor print off.         Effective December 28,2018, we will be moving across the [facility] to:  [address] [place] [address] 2 [address]  Our phone number will remain the same at [phone]

## 2018-12-19 ENCOUNTER — HOSPITAL ENCOUNTER (OUTPATIENT)
Dept: MRI IMAGING | Age: 73
Discharge: HOME | End: 2018-12-19
Attending: PSYCHIATRY & NEUROLOGY
Payer: MEDICARE

## 2018-12-19 DIAGNOSIS — M54.12 CERVICAL RADICULOPATHY: ICD-10-CM

## 2018-12-19 PROCEDURE — 72141 MRI NECK SPINE W/O DYE: CPT

## 2018-12-21 DIAGNOSIS — M47.22 CERVICAL SPONDYLOSIS WITH MYELOPATHY AND RADICULOPATHY: Primary | ICD-10-CM

## 2018-12-21 DIAGNOSIS — M47.12 CERVICAL SPONDYLOSIS WITH MYELOPATHY AND RADICULOPATHY: Primary | ICD-10-CM

## 2018-12-21 NOTE — PROGRESS NOTES
Attempted to call patient and left message on voicemail to call back. Cervical MRI without contrast revealed moderate to severe degenerative disc disease from C4-C7. Mild to moderate spinal stenosis from C3-C6. Moderate right neuroforaminal narrowing at C3-C4 E. Moderate left neuroforaminal narrowing at C4-5 and C6-C7. Mild bilateral foraminal narrowing C5-C6. Patient will require referral to orthopedic surgery for further evaluation and management.

## 2019-01-07 DIAGNOSIS — M54.12 CERVICAL RADICULOPATHY: ICD-10-CM

## 2019-01-07 RX ORDER — BACLOFEN 10 MG/1
TABLET ORAL
Qty: 90 TAB | Refills: 0 | Status: SHIPPED | OUTPATIENT
Start: 2019-01-07 | End: 2020-08-10

## 2019-01-09 ENCOUNTER — HOSPITAL ENCOUNTER (OUTPATIENT)
Dept: MRI IMAGING | Age: 74
Discharge: HOME | End: 2019-01-09
Attending: ORTHOPAEDIC SURGERY
Payer: MEDICARE

## 2019-01-09 DIAGNOSIS — M25.512 LEFT SHOULDER PAIN: ICD-10-CM

## 2019-01-09 PROCEDURE — 73221 MRI JOINT UPR EXTREM W/O DYE: CPT

## 2019-04-02 ENCOUNTER — ANESTHESIA EVENT (OUTPATIENT)
Dept: ENDOSCOPY | Age: 74
End: 2019-04-02
Payer: MEDICARE

## 2019-04-03 ENCOUNTER — HOSPITAL ENCOUNTER (OUTPATIENT)
Age: 74
Setting detail: OUTPATIENT SURGERY
Discharge: HOME | End: 2019-04-03
Attending: INTERNAL MEDICINE | Admitting: INTERNAL MEDICINE
Payer: MEDICARE

## 2019-04-03 ENCOUNTER — ANESTHESIA (OUTPATIENT)
Dept: ENDOSCOPY | Age: 74
End: 2019-04-03
Payer: MEDICARE

## 2019-04-03 VITALS
BODY MASS INDEX: 27.05 KG/M2 | WEIGHT: 147 LBS | RESPIRATION RATE: 18 BRPM | HEIGHT: 62 IN | DIASTOLIC BLOOD PRESSURE: 81 MMHG | OXYGEN SATURATION: 97 % | TEMPERATURE: 98.1 F | HEART RATE: 94 BPM | SYSTOLIC BLOOD PRESSURE: 135 MMHG

## 2019-04-03 PROCEDURE — 74011250637 HC RX REV CODE- 250/637: Performed by: INTERNAL MEDICINE

## 2019-04-03 PROCEDURE — 74011250636 HC RX REV CODE- 250/636

## 2019-04-03 PROCEDURE — 76040000019: Performed by: INTERNAL MEDICINE

## 2019-04-03 PROCEDURE — 88305 TISSUE EXAM BY PATHOLOGIST: CPT

## 2019-04-03 PROCEDURE — 76060000031 HC ANESTHESIA FIRST 0.5 HR: Performed by: INTERNAL MEDICINE

## 2019-04-03 PROCEDURE — 74011250636 HC RX REV CODE- 250/636: Performed by: INTERNAL MEDICINE

## 2019-04-03 PROCEDURE — 77030019988 HC FCPS ENDOSC DISP BSC -B: Performed by: INTERNAL MEDICINE

## 2019-04-03 RX ORDER — FLUMAZENIL 0.1 MG/ML
0.2 INJECTION INTRAVENOUS
Status: DISCONTINUED | OUTPATIENT
Start: 2019-04-03 | End: 2019-04-03 | Stop reason: HOSPADM

## 2019-04-03 RX ORDER — FLUMAZENIL 0.1 MG/ML
0.2 INJECTION INTRAVENOUS
Status: DISCONTINUED | OUTPATIENT
Start: 2019-04-03 | End: 2019-04-03 | Stop reason: SDUPTHER

## 2019-04-03 RX ORDER — DEXTROMETHORPHAN/PSEUDOEPHED 2.5-7.5/.8
1.2 DROPS ORAL
Status: DISCONTINUED | OUTPATIENT
Start: 2019-04-03 | End: 2019-04-03 | Stop reason: HOSPADM

## 2019-04-03 RX ORDER — SODIUM CHLORIDE 0.9 % (FLUSH) 0.9 %
5-40 SYRINGE (ML) INJECTION EVERY 8 HOURS
Status: DISCONTINUED | OUTPATIENT
Start: 2019-04-03 | End: 2019-04-03 | Stop reason: SDUPTHER

## 2019-04-03 RX ORDER — SODIUM CHLORIDE 9 MG/ML
100 INJECTION, SOLUTION INTRAVENOUS CONTINUOUS
Status: DISCONTINUED | OUTPATIENT
Start: 2019-04-03 | End: 2019-04-03 | Stop reason: HOSPADM

## 2019-04-03 RX ORDER — SODIUM CHLORIDE 0.9 % (FLUSH) 0.9 %
5-40 SYRINGE (ML) INJECTION EVERY 8 HOURS
Status: DISCONTINUED | OUTPATIENT
Start: 2019-04-03 | End: 2019-04-03 | Stop reason: HOSPADM

## 2019-04-03 RX ORDER — ATROPINE SULFATE 0.1 MG/ML
0.5 INJECTION INTRAVENOUS
Status: DISCONTINUED | OUTPATIENT
Start: 2019-04-03 | End: 2019-04-03 | Stop reason: SDUPTHER

## 2019-04-03 RX ORDER — NALOXONE HYDROCHLORIDE 0.4 MG/ML
0.4 INJECTION, SOLUTION INTRAMUSCULAR; INTRAVENOUS; SUBCUTANEOUS
Status: DISCONTINUED | OUTPATIENT
Start: 2019-04-03 | End: 2019-04-03 | Stop reason: HOSPADM

## 2019-04-03 RX ORDER — SODIUM CHLORIDE 0.9 % (FLUSH) 0.9 %
5-40 SYRINGE (ML) INJECTION AS NEEDED
Status: DISCONTINUED | OUTPATIENT
Start: 2019-04-03 | End: 2019-04-03 | Stop reason: SDUPTHER

## 2019-04-03 RX ORDER — ATROPINE SULFATE 0.1 MG/ML
0.5 INJECTION INTRAVENOUS
Status: DISCONTINUED | OUTPATIENT
Start: 2019-04-03 | End: 2019-04-03 | Stop reason: HOSPADM

## 2019-04-03 RX ORDER — DEXTROMETHORPHAN/PSEUDOEPHED 2.5-7.5/.8
1.2 DROPS ORAL
Status: DISCONTINUED | OUTPATIENT
Start: 2019-04-03 | End: 2019-04-03 | Stop reason: SDUPTHER

## 2019-04-03 RX ORDER — EPINEPHRINE 0.1 MG/ML
1 INJECTION INTRACARDIAC; INTRAVENOUS
Status: DISCONTINUED | OUTPATIENT
Start: 2019-04-03 | End: 2019-04-03 | Stop reason: SDUPTHER

## 2019-04-03 RX ORDER — PROPOFOL 10 MG/ML
INJECTION, EMULSION INTRAVENOUS AS NEEDED
Status: DISCONTINUED | OUTPATIENT
Start: 2019-04-03 | End: 2019-04-03 | Stop reason: HOSPADM

## 2019-04-03 RX ORDER — LIDOCAINE HYDROCHLORIDE 20 MG/ML
INJECTION, SOLUTION EPIDURAL; INFILTRATION; INTRACAUDAL; PERINEURAL AS NEEDED
Status: DISCONTINUED | OUTPATIENT
Start: 2019-04-03 | End: 2019-04-03 | Stop reason: HOSPADM

## 2019-04-03 RX ORDER — MIDAZOLAM HYDROCHLORIDE 1 MG/ML
1-3 INJECTION, SOLUTION INTRAMUSCULAR; INTRAVENOUS
Status: DISCONTINUED | OUTPATIENT
Start: 2019-04-03 | End: 2019-04-03 | Stop reason: HOSPADM

## 2019-04-03 RX ORDER — EPINEPHRINE 0.1 MG/ML
1 INJECTION INTRACARDIAC; INTRAVENOUS
Status: DISCONTINUED | OUTPATIENT
Start: 2019-04-03 | End: 2019-04-03 | Stop reason: HOSPADM

## 2019-04-03 RX ORDER — SODIUM CHLORIDE 0.9 % (FLUSH) 0.9 %
5-40 SYRINGE (ML) INJECTION AS NEEDED
Status: DISCONTINUED | OUTPATIENT
Start: 2019-04-03 | End: 2019-04-03 | Stop reason: HOSPADM

## 2019-04-03 RX ADMIN — SODIUM CHLORIDE 100 ML/HR: 900 INJECTION, SOLUTION INTRAVENOUS at 09:07

## 2019-04-03 RX ADMIN — SIMETHICONE 80 MG: 20 SUSPENSION/ DROPS ORAL at 09:56

## 2019-04-03 RX ADMIN — LIDOCAINE HYDROCHLORIDE 40 MG: 20 INJECTION, SOLUTION EPIDURAL; INFILTRATION; INTRACAUDAL; PERINEURAL at 09:46

## 2019-04-03 RX ADMIN — PROPOFOL 160 MG: 10 INJECTION, EMULSION INTRAVENOUS at 10:03

## 2019-04-03 NOTE — ANESTHESIA PREPROCEDURE EVALUATION
Relevant Problems No relevant active problems Anesthetic History No history of anesthetic complications Review of Systems / Medical History Patient summary reviewed, nursing notes reviewed and pertinent labs reviewed Pulmonary Within defined limits Asthma Neuro/Psych Psychiatric history Comments: Depression Cardiovascular Hypertension CHF Dysrhythmias CAD Comments: Palpitations TTE (12/24/13):  EF=55-60% GI/Hepatic/Renal 
  
 
 
 
 
 
Comments: H/O Colon Polyps Endo/Other Diabetes: well controlled, type 2 Other Findings Physical Exam 
 
Airway Mallampati: II 
TM Distance: 4 - 6 cm Neck ROM: normal range of motion Mouth opening: Normal 
 
 Cardiovascular Regular rate and rhythm,  S1 and S2 normal,  no murmur, click, rub, or gallop Dental 
 
Dentition: Implants and Caps/crowns Comments: Lower implants Pulmonary Breath sounds clear to auscultation Abdominal 
GI exam deferred Other Findings Anesthetic Plan ASA: 3 Anesthesia type: general and total IV anesthesia Induction: Intravenous Anesthetic plan and risks discussed with: Patient

## 2019-04-03 NOTE — PROCEDURES
[facility]                  Colonoscopy Operative Report    4/3/2019      Nicholas Munson  [id number]  1945    Procedure Type:   Colonoscopy --screening     Indications:    Personal history of colon polyps (screening only)     Pre-operative Diagnosis: see indication above    Post-operative Diagnosis:  See findings below    :  Dev Mckeon MD    Referring Provider: Shyann Webster MD      Sedation:  MAC anesthesia Propofol    Pre-Procedural Exam:      Airway: clear,  No airway problems anticipated  Heart: RRR, without gallops or rubs  Lungs: clear bilaterally without wheezes, crackles, or rhonchi  Abdomen: soft, nontender, nondistended, bowel sounds present  Mental Status: awake, alert and oriented to person, place and time     Procedure Details:  After informed consent was obtained with all risks and benefits of procedure explained and preoperative exam completed, the patient was taken to the endoscopy suite and placed in the left lateral decubitus position. Upon sequential sedation as per above, a digital rectal exam was performed . The Olympus videocolonoscope  was inserted in the rectum and carefully advanced to the cecum, which was identified by the ileocecal valve and appendiceal orifice. The cecum was identified by the ileocecal valve and appendiceal orifice. The quality of preparation was good. The colonoscope was slowly withdrawn with careful evaluation between folds. Retroflexion in the rectum was completed demonstrating internal hemorrhoids. Findings:   Rectum: Grade 1 internal and external hemorrhoid(s); Sigmoid:     - Diverticulosis  Descending Colon: normal  Transverse Colon: normal  Ascending Colon: normal  Cecum: 3 mm polyp, removed with forceps  Terminal Ileum: not intubated      Specimen Removed:  none    Complications: None. EBL:  None.     Impression:    Small cecal polyp removed  diverticulosis,  Mild in degree, involving the sigmoid  hemorrhoids internal and external, Moderate in size    Recommendations: --Repeat colonoscopy in 5 years. High fiber diet. Resume normal medication(s). Discharge Disposition:  Home in the company of a  when able to ambulate. Mario Solis MD    4/3/2019     SALVATORE Ocampo MD  [facility], [address] [facility] [place] [address] [place] [address]  [phone]  [email or web address]

## 2019-04-03 NOTE — DISCHARGE INSTRUCTIONS
Kaden Elise MD  [facility], [address]  [place], [address] [facility]  [phone]  [email or web address]. Georgette Jiang  [id number]  1945    COLON DISCHARGE INSTRUCTIONS  Discomfort:  Redness at IV site- apply warm compress to area; if redness or soreness persist- contact your physician  There may be a slight amount of blood passed from the rectum  Gaseous discomfort- walking, belching will help relieve any discomfort  You may not operate a vehicle for 12 hours  You may not engage in an occupation involving machinery or appliances for rest of today  You may not drink alcoholic beverages for at least 12 hours  Avoid making any critical decisions for at least 24 hour  DIET:   High fiber diet. - however -  remember your colon is empty and a heavy meal will produce gas. Avoid these foods:  vegetables, fried / greasy foods, carbonated drinks for today      ACTIVITY:  You may resume your normal daily activities it is recommended that you spend the remainder of the day resting -  avoid any strenuous activity. CALL M.D. ANY SIGN OF:   Increasing pain, nausea, vomiting  Abdominal distension (swelling)  New increased bleeding (oral or rectal)  Fever (chills)     COLONOSCOPY FINDINGS:  Your colonoscopy showed: One small  polyp found and removed. Do have hemorrhoids and mild diverticulosis. Follow-up Instructions:   Call Dr. Kaden Elise if any questions or problems. Telephone # [phone]    Should have a repeat colonoscopy in 5 years.

## 2019-04-03 NOTE — H&P
Wagner Barrett MD  [facility], [address]  [phone]  [email or web address]    Gastroenterology Outpatient History and Physical    Patient: Christine Briggs    Physician: Grayson Feliz MD    Vital Signs: Blood pressure (!) 140/95, pulse 93, temperature 98.2 °F (36.8 °C), resp. rate 21, height 5' 2\" (1.575 m), weight 66.7 kg (147 lb), SpO2 98 %, not currently breastfeeding. Allergies: Allergies   Allergen Reactions    Baclofen Other (comments)     Hallucinations    Albuterol Palpitations    Bactrim [Sulfamethoxazole-Trimethoprim] Not Reported This Time    Diuretic Softgels [Pamabrom] Other (comments)     Lowers potassium, very sensitive      Iodine Unknown (comments)    Shellfish Containing Products Unknown (comments)     \"got choked, It might have been the spices\"    Tizanidine Unknown (comments)       Chief Complaint: Hx of polyps    History of Present Illness: Personal history of colonic polyps. Last colonoscopy was 2013 and showed polyps which were removed. Currently has no GI symptoms. No FH of colon cancer or polyps.       History:  Past Medical History:   Diagnosis Date    Arrhythmia     Asthma     CAD (coronary artery disease)     Diabetes ([facility] [address]     Hypertension     Other ill-defined conditions(799.89)     heart palpitations    Psychiatric disorder     depression      Past Surgical History:   Procedure Laterality Date    HX GYN      hysterectomy    HX HEENT      tonsillectomy    HX HEENT Bilateral     catarac surgery    HX OTHER SURGICAL      foot left      Social History     Socioeconomic History    Marital status:      Spouse name: Not on file    Number of children: Not on file    Years of education: Not on file    Highest education level: Not on file   Tobacco Use    Smoking status: Never Smoker    Smokeless tobacco: Never Used   Substance and Sexual Activity  Alcohol use: No    Drug use: Never    Sexual activity: Yes     Partners: Male      Family History   Problem Relation Age of Onset    Cancer Father     Stroke Father     Heart Disease Paternal Grandfather       Patient Active Problem List   Diagnosis Code    CHF (congestive heart failure) ([facility]) I50.9       Medications:   Prior to Admission medications    Medication Sig Start Date End Date Taking? Authorizing Provider   levalbuterol tartrate (XOPENEX) 45 mcg/actuation inhaler INHALE 1 PUFF BY MOUTH EVERY 4 HOURS AS NEEDED 11/16/18  Yes Provider, Historical   losartan (COZAAR) 50 mg tablet Take  by mouth daily. Yes Provider, Historical   glipiZIDE (GLUCOTROL) 5 mg tablet Take 1 pill twice daily for 7 days then resume old regimen of 1 tab daily 12/26/13  Yes Angelic Guerrero MD   furosemide (LASIX) 20 mg tablet 1 tab every other day as needed as per cardiology 12/26/13  Yes Angelic Guerrero MD   Blood-Glucose Meter monitoring kit With testing strips & lancets 1 month supply 12/26/13  Yes Angelic Guerrero MD   diltiazem hcl 180 mg Tb24 Take 1 Tab by mouth daily. Yes Provider, Historical   sitaGLIPtin (JANUVIA) 100 mg tablet Take 100 mg by mouth daily. Yes Provider, Historical   buPROPion SR (WELLBUTRIN SR) 150 mg SR tablet Take 150 mg by mouth two (2) times a day. Yes Other, MD Junior   hydrOXYzine (ATARAX) 25 mg tablet Take 1 Tab by mouth every six (6) hours as needed for Anxiety. 1/2/13  Yes Sumanth Mccabe MD   [facility]) 220 mcg (30 doses) inhalation capsule Take 1 Cap by inhalation two (2) times a day.    Yes Other, MD Junior   baclofen (LIORESAL) 10 mg tablet TAKE 1 TABLET BY MOUTH AT BEDTIME X1WK THEN TAKE 1 TAB 2XDAY X1 WEEK THEN TAKE 1 TAB 3 TIMES DAILY 1/7/19   MD ROXI Fisher 145 mcg cap capsule TAKE 1 CAPSULE BY MOUTH EVERY DAY 11/16/18   Provider, Historical   methocarbamol (ROBAXIN) 750 mg tablet TAKE 1 TABLET BY MOUTH 3 TIMES A DAY AS NEEDED FOR SPASMS 10/23/18   Provider, Historical   conjugated estrogens (PREMARIN) 0.625 mg/gram vaginal cream Insert 0.5 g into vagina daily as needed. Junior Mcconnell MD   gemfibrozil (LOPID) 600 mg tablet Take 600 mg by mouth two (2) times a day. Junior Mcconnell MD   nebivolol (BYSTOLIC) 5 mg tablet Take 5 mg by mouth daily. Junior Mcconnell MD   naproxen (NAPROSYN) 500 mg tablet Take 500 mg by mouth two (2) times daily (with meals). Junior Mcconnell MD   digoxin (LANOXIN) 0.25 mg tablet Take 0.25 mg by mouth daily. Provider, Historical   HYDROcodone-acetaminophen (NORCO) 5-300 mg tablet Take 1 Tab by mouth every eight (8) hours as needed. Provider, Historical   krill-omega-3-dha-epa-lipids (KRILL OIL) 647-98-10-50 mg cap Take 1 Cap by mouth daily. Provider, Historical   aspirin 81 mg tablet Take 81 mg by mouth daily. Junior Mcconnell MD   triamcinolone acetonide (KENALOG) 0.1 % topical cream Apply  to affected area two (2) times a day. use thin layer     Junior Mcconnell MD   montelukast (SINGULAIR) 10 mg tablet Take 10 mg by mouth nightly.     Junior Mcconnell MD       Physical Exam:     General: well developed, well nourished   HEENT: unremarkable   Heart: regular rhythm no mumur    Lungs: clear   Abdominal:  benign   Neurological: unremarkable   Extremities: no edema     Findings/Diagnosis: Personal history of colonic polyps  Plan of Care/Planned Procedure: Colonoscopy with monitored anesthesia care sedation    Signed:  Grayson Feliz MD 4/3/2019

## 2019-04-03 NOTE — ANESTHESIA POSTPROCEDURE EVALUATION
Procedure(s): 
COLONOSCOPY 
ENDOSCOPIC POLYPECTOMY. total IV anesthesia Anesthesia Post Evaluation Patient location during evaluation: PACU Note status: Adequate. Level of consciousness: responsive to verbal stimuli and sleepy but conscious Pain management: satisfactory to patient Airway patency: patent Anesthetic complications: no 
Cardiovascular status: acceptable Respiratory status: acceptable Hydration status: acceptable Comments: +Post-Anesthesia Evaluation and Assessment Patient: Linda Koyanagi MRN: [id number]  SSN: [id number] YOB: 1945  Age: 66 y.o. Sex: female Cardiovascular Function/Vital Signs /86   Pulse 93   Temp 36.8 °C (98.2 °F)   Resp 18   Ht 5' 2\" (1.575 m)   Wt 66.7 kg (147 lb)   SpO2 97%   Breastfeeding? No   BMI 26.89 kg/m² Patient is status post Procedure(s): 
COLONOSCOPY 
ENDOSCOPIC POLYPECTOMY. Nausea/Vomiting: Controlled. Postoperative hydration reviewed and adequate. Pain: 
Pain Scale 1: Numeric (0 - 10) (04/03/19 1009) Pain Intensity 1: 0 (04/03/19 1009) Managed. Neurological Status: At baseline. Mental Status and Level of Consciousness: Arousable. Pulmonary Status:  
O2 Device: Room air (04/03/19 1009) Adequate oxygenation and airway patent. Complications related to anesthesia: None Post-anesthesia assessment completed. No concerns. Signed By: Madai Chavez MD  
 4/3/2019 Post anesthesia nausea and vomiting:  controlled Vitals Value Taken Time /86 4/3/2019 10:09 AM  
Temp Pulse 93 4/3/2019 10:09 AM  
Resp 18 4/3/2019 10:09 AM  
SpO2 97 % 4/3/2019 10:09 AM

## 2019-04-03 NOTE — PROGRESS NOTES
Anesthesia reports 160mg Propofol, 40mg Lidocaine and 450mL NS given during procedure. Received report from anesthesia staff on vital signs and status of patient.

## 2019-04-03 NOTE — ROUTINE PROCESS
[facility]it[facility]  1945  [id number]    Situation:  Verbal report received from: Teresa RYDER  Procedure: Procedure(s):  COLONOSCOPY  ENDOSCOPIC POLYPECTOMY    Background:    Preoperative diagnosis: HX POLYPS  Postoperative diagnosis: Diverticulosis, polyp, hemorrhoids      :  Dr. Eric Jain  Assistant(s): Endoscopy Technician-1: Carol Fowler  Endoscopy RN-1: Rajat Mejia RN    Specimens:   ID Type Source Tests Collected by Time Destination   1 : Cecal polyp Preservative Cecum  Ranjana Tomlinson MD 4/3/2019 0957 Pathology     H. Pylori  no    Assessment:  Intra-procedure medications       Anesthesia gave intra-procedure sedation and medications, see anesthesia flow sheet yes    Intravenous fluids: NS@ KVO     Vital signs stable       Abdominal assessment: round and soft       Recommendation:  Discharge patient per MD order  .   Return to floor  Family or Friend Bobby     Permission to share finding with family or friend yes

## 2020-08-10 ENCOUNTER — HOSPITAL ENCOUNTER (OUTPATIENT)
Dept: PREADMISSION TESTING | Age: 75
Discharge: HOME | End: 2020-08-10
Attending: ORTHOPAEDIC SURGERY
Payer: MEDICARE

## 2020-08-10 VITALS
WEIGHT: 157.19 LBS | SYSTOLIC BLOOD PRESSURE: 114 MMHG | DIASTOLIC BLOOD PRESSURE: 64 MMHG | RESPIRATION RATE: 16 BRPM | BODY MASS INDEX: 28.93 KG/M2 | HEART RATE: 97 BPM | HEIGHT: 62 IN | TEMPERATURE: 98.1 F | OXYGEN SATURATION: 97 %

## 2020-08-10 LAB
ABO + RH BLD: NORMAL
ALBUMIN SERPL-MCNC: 4.1 G/DL (ref 3.5–5)
ALBUMIN/GLOB SERPL: 1.1 {RATIO} (ref 1.1–2.2)
ALP SERPL-CCNC: 84 U/L (ref 45–117)
ALT SERPL-CCNC: 19 U/L (ref 12–78)
ANION GAP SERPL CALC-SCNC: 5 MMOL/L (ref 5–15)
APPEARANCE UR: CLEAR
AST SERPL-CCNC: 20 U/L (ref 15–37)
BACTERIA URNS QL MICRO: NEGATIVE /HPF
BILIRUB SERPL-MCNC: 0.9 MG/DL (ref 0.2–1)
BILIRUB UR QL: NEGATIVE
BLOOD GROUP ANTIBODIES SERPL: NORMAL
BUN SERPL-MCNC: 20 MG/DL (ref 6–20)
BUN/CREAT SERPL: 19 (ref 12–20)
CALCIUM SERPL-MCNC: 9.6 MG/DL (ref 8.5–10.1)
CHLORIDE SERPL-SCNC: 106 MMOL/L (ref 97–108)
CO2 SERPL-SCNC: 28 MMOL/L (ref 21–32)
COLOR UR: NORMAL
CREAT SERPL-MCNC: 1.07 MG/DL (ref 0.55–1.02)
EPITH CASTS URNS QL MICRO: NORMAL /LPF
ERYTHROCYTE [DISTWIDTH] IN BLOOD BY AUTOMATED COUNT: 13.6 % (ref 11.5–14.5)
EST. AVERAGE GLUCOSE BLD GHB EST-MCNC: 117 MG/DL
GLOBULIN SER CALC-MCNC: 3.6 G/DL (ref 2–4)
GLUCOSE SERPL-MCNC: 101 MG/DL (ref 65–100)
GLUCOSE UR STRIP.AUTO-MCNC: NEGATIVE MG/DL
HBA1C MFR BLD: 5.7 % (ref 4–5.6)
HCT VFR BLD AUTO: 33.4 % (ref 35–47)
HGB BLD-MCNC: 10.7 G/DL (ref 11.5–16)
HGB UR QL STRIP: NEGATIVE
HYALINE CASTS URNS QL MICRO: NORMAL /LPF (ref 0–5)
INR PPP: 1.4 (ref 0.9–1.1)
KETONES UR QL STRIP.AUTO: NEGATIVE MG/DL
LEUKOCYTE ESTERASE UR QL STRIP.AUTO: NEGATIVE
MCH RBC QN AUTO: 30.6 PG (ref 26–34)
MCHC RBC AUTO-ENTMCNC: 32 G/DL (ref 30–36.5)
MCV RBC AUTO: 95.4 FL (ref 80–99)
NITRITE UR QL STRIP.AUTO: NEGATIVE
NRBC # BLD: 0 K/UL (ref 0–0.01)
NRBC BLD-RTO: 0 PER 100 WBC
PH UR STRIP: 6.5 [PH] (ref 5–8)
PLATELET # BLD AUTO: 170 K/UL (ref 150–400)
PMV BLD AUTO: 10.6 FL (ref 8.9–12.9)
POTASSIUM SERPL-SCNC: 3.9 MMOL/L (ref 3.5–5.1)
PROT SERPL-MCNC: 7.7 G/DL (ref 6.4–8.2)
PROT UR STRIP-MCNC: NEGATIVE MG/DL
PROTHROMBIN TIME: 14.3 SEC (ref 9–11.1)
RBC # BLD AUTO: 3.5 M/UL (ref 3.8–5.2)
RBC #/AREA URNS HPF: NORMAL /HPF (ref 0–5)
SODIUM SERPL-SCNC: 139 MMOL/L (ref 136–145)
SP GR UR REFRACTOMETRY: 1.01 (ref 1–1.03)
SPECIMEN EXP DATE BLD: NORMAL
UA: UC IF INDICATED,UAUC: NORMAL
UROBILINOGEN UR QL STRIP.AUTO: 0.2 EU/DL (ref 0.2–1)
WBC # BLD AUTO: 4.9 K/UL (ref 3.6–11)
WBC URNS QL MICRO: NORMAL /HPF (ref 0–4)

## 2020-08-10 PROCEDURE — 86900 BLOOD TYPING SEROLOGIC ABO: CPT

## 2020-08-10 PROCEDURE — 85027 COMPLETE CBC AUTOMATED: CPT

## 2020-08-10 PROCEDURE — 80053 COMPREHEN METABOLIC PANEL: CPT

## 2020-08-10 PROCEDURE — 36415 COLL VENOUS BLD VENIPUNCTURE: CPT

## 2020-08-10 PROCEDURE — 83036 HEMOGLOBIN GLYCOSYLATED A1C: CPT

## 2020-08-10 PROCEDURE — 81001 URINALYSIS AUTO W/SCOPE: CPT

## 2020-08-10 PROCEDURE — 85610 PROTHROMBIN TIME: CPT

## 2020-08-10 RX ORDER — FUROSEMIDE 40 MG/1
40 TABLET ORAL DAILY
COMMUNITY
End: 2020-09-23

## 2020-08-10 RX ORDER — DOCUSATE SODIUM 100 MG/1
100 CAPSULE, LIQUID FILLED ORAL 2 TIMES DAILY
COMMUNITY
End: 2021-04-22

## 2020-08-10 RX ORDER — ACETAMINOPHEN 500 MG
1000 TABLET ORAL ONCE
Status: CANCELLED | OUTPATIENT
Start: 2020-08-17 | End: 2020-08-17

## 2020-08-10 RX ORDER — SODIUM CHLORIDE, SODIUM LACTATE, POTASSIUM CHLORIDE, CALCIUM CHLORIDE 600; 310; 30; 20 MG/100ML; MG/100ML; MG/100ML; MG/100ML
25 INJECTION, SOLUTION INTRAVENOUS CONTINUOUS
Status: CANCELLED | OUTPATIENT
Start: 2020-08-17

## 2020-08-10 RX ORDER — PREGABALIN 75 MG/1
75 CAPSULE ORAL ONCE
Status: CANCELLED | OUTPATIENT
Start: 2020-08-17 | End: 2020-08-17

## 2020-08-10 RX ORDER — NEBIVOLOL 2.5 MG/1
2.5 TABLET ORAL
COMMUNITY

## 2020-08-10 RX ORDER — LANOLIN ALCOHOL/MO/W.PET/CERES
65 CREAM (GRAM) TOPICAL EVERY OTHER DAY
COMMUNITY
End: 2020-09-23

## 2020-08-10 RX ORDER — CELECOXIB 200 MG/1
400 CAPSULE ORAL ONCE
Status: CANCELLED | OUTPATIENT
Start: 2020-08-17 | End: 2020-08-17

## 2020-08-10 NOTE — PERIOP NOTES
Incentive Spirometer        Using the incentive spirometer helps expand the small air sacs of your lungs, helps you breathe deeply, and helps improve your lung function. Use your incentive spirometer twice a day (10 breaths each time) prior to surgery. How to Use Your Incentive Spirometer:  1. Hold the incentive spirometer in an upright position. 2. Breathe out as usual.   3. Place the mouthpiece in your mouth and seal your lips tightly around it. 4. Take a deep breath. Breathe in slowly and as deeply as possible. Keep the blue flow rate guide between the arrows. 5. Hold your breath as long as possible. Then exhale slowly and allow the piston to fall to the bottom of the column. 6. Rest for a few seconds and repeat steps one through five at least 10 times. PAT Tidal Volume__1250________________  x__2______________  Date__08/10/20_____________________    Tracy C[place]hen THE INCENTIVE SPIROMETER WITH YOU TO THE HOSPITAL ON THE DAY OF YOUR SURGERY. Opportunity given to ask and answer questions as well as to observe return demonstration.     Patient signature_____________________________    Witness____________________________

## 2020-08-10 NOTE — PERIOP NOTES
[facility]  Joint/Spine Preoperative Instructions        Surgery Date 08/17/20          Time of Arrival To Be Called Contact # [phone]  Covid Test Scheduled for Thursday, 08/13/20 at 10:15 am    1. On the day of your surgery, please report to the Surgical Services Registration Desk and sign in at your designated time. The Surgery Center is located to the right of the Emergency Room. 2. You must have someone with you to drive you home. You should not drive a car for 24 hours following surgery. Please make arrangements for a friend or family member to stay with you for the first 24 hours after your surgery. 3. No food after midnight . Medications morning of surgery should be taken with a sip of water. Please follow pre-surgery drink instructions that were given at your Pre Admission Testing appointment. 4. We recommend you do not drink any alcoholic beverages for 24 hours before and after your surgery. 5. Contact your surgeons office for instructions on the following medications: non-steroidal anti-inflammatory drugs (i.e. Advil, Aleve), vitamins, and supplements. (Some surgeons will want you to stop these medications prior to surgery and others may allow you to take them)  **If you are currently taking Plavix, Coumadin, Aspirin and/or other blood-thinning agents, contact your surgeon for instructions. ** Your surgeon will partner with the physician prescribing these medications to determine if it is safe to stop or if you need to continue taking. Please do not stop taking these medications without instructions from your surgeon    6. Wear comfortable clothes. Wear glasses instead of contacts. Do not bring any money or jewelry. Please bring picture ID, insurance card, and any prearranged co-payment or hospital payment. Do not wear make-up, particularly mascara the morning of your surgery.   Do not wear nail polish, particularly if you are having foot /hand surgery. Wear your hair loose or down, no ponytails, buns, eugenio pins or clips. All body piercings must be removed. Please shower with antibacterial soap for three consecutive days before and on the morning of surgery, but do not apply any lotions, powders or deodorants after the shower on the day of surgery. Please use a fresh towels after each shower. Please sleep in clean clothes and change bed linens the night before surgery. Please do not shave for 48 hours prior to surgery. Shaving of the face is acceptable. 7. You should understand that if you do not follow these instructions your surgery may be cancelled. If your physical condition changes (I.e. fever, cold or flu) please contact your surgeon as soon as possible. 8. It is important that you be on time. If a situation occurs where you may be late, please call [phone] (OR Holding Area). 9. If you have any questions and or problems, please call [phone] (Pre-admission Testing). 10. Your surgery time may be subject to change. You will receive a phone call the evening prior if your time changes. 11.  If having outpatient surgery, you must have someone to drive you here, stay with you during the duration of your stay, and to drive you home at time of discharge. 12. The following link is for the educational video for patients and/or families. http://[facility].org/. com/locations/iuvbfdqwe-xdfivxi-xuuvyqi/[place]/[facility]/educational-materials    Special Instructions: FOLLOW INSTRUCTIONS REGARDING XARELTO STOP 5 DAYS PRIOR TO SURGERY PER DR FONG      TAKE ALL MEDICATIONS THE DAY OF SURGERY EXCEPT:STOOL SOFTENER, IRON, LOSARTAN, Deliah Bar      I understand a pre-operative phone call will be made to verify my surgery time. In the event that I am not available, I give permission for a message to be left on my answering service and/or with another person?   yes         ___________________      __________ _________    (Signature of Patient)             (Witness)                (Date and Time)

## 2020-08-10 NOTE — PERIOP NOTES
Hibiclens/Chlorhexidine    Preventing Infections Before and After - Your Surgery    IMPORTANT INSTRUCTIONS    Please read and follow these instructions carefully. If you are unable to comply with the below instructions your procedure will be cancelled. Every Night for Three (3) nights before your surgery:  1. Shower with an antibacterial soap, such as Dial, or the soap provided at your preassessment appointment. A shower is better than a bath for cleaning your skin. 2. If needed, ask someone to help you reach all areas of your body. Dont forget to clean your belly button with every shower. The night before your surgery: If you lose your Hibiclens/chlorhexidine please contact surgery center or you can purchase it at a local pharmacy  1. On the night before your surgery, shower with an antibacterial soap, such as Dial, or the soap provided at your preassessment appointment. 2. With one packet of Hibiclens/Chlorhexidine in hand, turn water off.  3. Apply Hibiclens antiseptic skin cleanser with a clean, freshly washed washcloth. ? Gently apply to your body from chin to toes (except the genital area) and especially the area(s) where your incision(s) will be. ? Leave Hibiclens/Chlorhexidine on your skin for at least 20 seconds. CAUTION: If needed, Hibiclens/chlorhexidine may be used to clean the folds of skin of the legs (such as in the area of the groin) and on your buttocks and hips. However, do not use Hibiclens/Chlorhexidine above the neck or in the genital area (your bottom) or put inside any area of your body. 4. Turn the water back on and rinse. 5. Dry gently with a clean, freshly washed towel. 6. After your shower, do not use any powder, deodorant, perfumes or lotion. 7. Use clean, freshly washed towels and washcloths every time you shower. 8. Wear clean, freshly washed pajamas to bed the night before surgery. 9. Sleep on clean, freshly washed sheets.   10. Do not allow pets to sleep in your bed with you. The Morning of your surgery:  1. Shower again thoroughly with an antibacterial soap, such as Dial or the soap provided at your preassessment appointment. If needed, ask someone for help to reach all areas of your body. Dont forget to clean your belly button! Rinse. 2. Dry gently with a clean, freshly washed towel. 3. After your shower, do not use any powder, deodorant, perfumes or lotion prior to surgery. 4. Put on clean, freshly washed clothing. Tips to help prevent infections after your surgery:  1. Protect your surgical wound from germs:  ? Hand washing is the most important thing you and your caregivers can do to prevent infections. ? Keep your bandage clean and dry! ? Do not touch your surgical wound. 2. Use clean, freshly washed towels and washcloths every time you shower; do not share bath linens with others. 3. Until your surgical wound is healed, wear clothing and sleep on bed linens each day that are clean and freshly washed. 4. Do not allow pets to sleep in your bed with you or touch your surgical wound. 5. Do not smoke - smoking delays wound healing. This may be a good time to stop smoking. 6. If you have diabetes, it is important for you to manage your blood sugar levels properly before your surgery as well as after your surgery. Poorly managed blood sugar levels slow down wound healing and prevent you from healing completely. If you lose your Hibiclens/chlorhexidine, please call the [facility], or it is available for purchase at your pharmacy.                ___________________      ___________________      ________________  (Signature of Patient)          (Witness)                   (Date and Time)

## 2020-08-10 NOTE — PERIOP NOTES
Patient has a cardiac clearance appointment with Dr Ugo Green on Wednesday 08/12/20. Ekg was not done during PAT appointment. 08/12/20- called Dr Jennifer Caruso office and requested office note and ekg. They will fax when available. Covid test scheduled for 08/13/20. Patient instructed to self isolate after Covid Swab Test per Recommendations.

## 2020-08-10 NOTE — PERIOP NOTES

## 2020-08-11 LAB
BACTERIA SPEC CULT: NORMAL
BACTERIA SPEC CULT: NORMAL
SERVICE CMNT-IMP: NORMAL

## 2020-08-11 NOTE — ADVANCED PRACTICE NURSE
PAT Nurse Practitioner   Pre-Operative Chart Review/Assessment:-ORTHOPEDIC/NEUROSURGICAL SPINE                Patient Name:  Kutr Casas                                                         Age:   66 y.o.    :  1945     Today's Date:  2020     Date of PAT:   8/10/20     Date of Surgery:    20      Procedure(s):  Right  Unicondylar arthroplasty     Surgeon:   Otoniel Alaniz     Medical Clearance:  Cata Swan NP                   PLAN:      1)  Cardiac Clearance:  Dr. Vimal Ricci       2)  Diabetic Treatment Consult:  Not indicated-A1C 5.7      3)  Sleep Apnea evaluation:   Not indicated-POONAM 3      4) Treatment for MRSA/Staph Aureus:  Negative      5) Additional Concerns:  T2DM, VTE hx, a fib, CAD, CHF, asthma, visula impairment                Vital Signs:         Vitals:    08/10/20 1034   BP: 114/64   Pulse: 97   Resp: 16   Temp: 98.1 °F (36.7 °C)   SpO2: 97%   Weight: 71.3 kg (157 lb 3 oz)   Height: 5' 2\" (1.575 m)            ____________________________________________  PAST MEDICAL HISTORY  Past Medical History:   Diagnosis Date    Arrhythmia     AFIB    Arthritis     Asthma     CAD (coronary artery disease)     Diabetes ([address]     Heart failure (HCC)     Hepatitis     AGE 8     High cholesterol     Hypertension     Other ill-defined conditions(799.89)     heart palpitations    Psychiatric disorder     depression    Spinal stenosis     Thromboembolus ([address]     PULMONARY EMBOLIS IN BOTH LUNGS    Vitreous detachment of left eye     BLIND IN LEFT EYE      ____________________________________________  PAST SURGICAL HISTORY  Past Surgical History:   Procedure Laterality Date    COLONOSCOPY N/A 4/3/2019    COLON[facility]COPY    COLORECTAL SCRN; HI RISK IND  4/3/2019         HX CATARACT REMOVAL Bilateral     HX HYSTERECTOMY      HX OTHER SURGICAL      foot left    HX TONSILLECTOMY ____________________________________________  HOME MEDICATIONS    Current Outpatient Medications   Medication Sig    rivaroxaban (Xarelto) 20 mg tab tablet Take 20 mg by mouth daily (with dinner).  ferrous sulfate (Iron) 325 mg (65 mg iron) tablet Take 65 mg by mouth every other day.  furosemide (Lasix) 40 mg tablet Take 40 mg by mouth daily. 1 tablet daily and may take extra tablet as needed in the afternoon for edema    nebivoloL (Bystolic) 2.5 mg tablet Take 2.5 mg by mouth nightly.  docusate sodium (Stool Softener) 100 mg capsule Take 100 mg by mouth two (2) times a day.  levalbuterol tartrate (XOPENEX) 45 mcg/actuation inhaler INHALE 1 PUFF BY MOUTH EVERY 4 HOURS AS NEEDED    losartan (COZAAR) 50 mg tablet Take 25 mg by mouth daily. 1/2 tablet daily    furosemide (LASIX) 20 mg tablet 1 tab every other day as needed as per cardiology (Patient taking differently: Take 40 mg by mouth daily as needed. 1 tablet daily and may take extra tablet as needed in the afternoon for edema)    Blood-Glucose Meter monitoring kit With testing strips & lancets 1 month supply    gemfibrozil (LOPID) 600 mg tablet Take 600 mg by mouth two (2) times a day.  diltiazem hcl 180 mg Tb24 Take 1 Tab by mouth nightly.  sitaGLIPtin (JANUVIA) 100 mg tablet Take 100 mg by mouth daily.  buPROPion SR (WELLBUTRIN SR) 150 mg SR tablet Take 150 mg by mouth two (2) times a day.  mometasone (ASMANEX TWISTHALER) 220 mcg (30 doses) inhalation capsule Take 1 Cap by inhalation two (2) times a day.  montelukast (SINGULAIR) 10 mg tablet Take 10 mg by mouth nightly.      No current facility-administered medications for this encounter.       ____________________________________________  ALLERGIES  Allergies   Allergen Reactions    Baclofen Other (comments)     Hallucinations    Albuterol Palpitations    Bactrim [Sulfamethoxazole-Trimethoprim] Not Reported This Time    Diuretic Softgels [Pamabrom] Other (comments) Lowers potassium, very sensitive      Iodine Unknown (comments)    Lipitor [Atorvastatin] Myalgia    Shellfish Containing Products Unknown (comments)     \"got choked, It might have been the spices\"    Tizanidine Unknown (comments)      ____________________________________________  SOCIAL HISTORY  Social History     Tobacco Use    Smoking status: Never Smoker    Smokeless tobacco: Never Used   Substance Use Topics    Alcohol use: No      ____________________________________________        Labs:     Hospital Outpatient Visit on 08/10/2020   Component Date Value Ref Range Status    Crossmatch Expiration 08/10/2020 08/20/2020   Final    ABO/Rh(D) 08/10/2020 A POSITIVE   Final    Antibody screen 08/10/2020 NEG   Final    WBC 08/10/2020 4.9  3.6 - 11.0 K/uL Final    RBC 08/10/2020 3.50* 3.80 - 5.20 M/uL Final    HGB 08/10/2020 10.7* 11.5 - 16.0 g/dL Final    HCT 08/10/2020 33.4* 35.0 - 47.0 % Final    MCV 08/10/2020 95.4  80.0 - 99.0 FL Final    MCH 08/10/2020 30.6  26.0 - 34.0 PG Final    MCHC 08/10/2020 32.0  30.0 - 36.5 g/dL Final    RDW 08/10/2020 13.6  11.5 - 14.5 % Final    PLATELET 77/19/0636 410  150 - 400 K/uL Final    MPV 08/10/2020 10.6  8.9 - 12.9 FL Final    NRBC 08/10/2020 0.0  0  WBC Final    ABSOLUTE NRBC 08/10/2020 0.00  0.00 - 0.01 K/uL Final    Special Requests: 08/10/2020 NO SPECIAL REQUESTS    Preliminary    Culture result: 08/10/2020 MRSA NOT PRESENT AT 23 HOURS    Preliminary    Hemoglobin A1c 08/10/2020 5.7* 4.0 - 5.6 % Final    Comment: NEW METHOD  PLEASE NOTE NEW REFERENCE RANGE  (NOTE)  HbA1C Interpretive Ranges  <5.7              Normal  5.7 - 6.4         Consider Prediabetes  >6.5              Consider Diabetes      Est. average glucose 08/10/2020 117  mg/dL Final    INR 08/10/2020 1.4* 0.9 - 1.1   Final    A single therapeutic range for Vit K antagonists may not be optimal for all indications - see June, 2008 issue of Chest[employer] Evidence-Based Clinical Practice Guidelines, 8th Edition.  Prothrombin time 08/10/2020 14.3* 9.0 - 11.1 sec Final    Instrument and technical errors have been ruled out    Color 08/10/2020 YELLOW/STRAW    Final    Color Reference Range: Straw, Yellow or Dark Yellow    Appearance 08/10/2020 CLEAR  CLEAR   Final    Specific gravity 08/10/2020 1.010  1.003 - 1.030   Final    pH (UA) 08/10/2020 6.5  5.0 - 8.0   Final    Protein 08/10/2020 Negative  NEG mg/dL Final    Glucose 08/10/2020 Negative  NEG mg/dL Final    Ketone 08/10/2020 Negative  NEG mg/dL Final    Bilirubin 08/10/2020 Negative  NEG   Final    Blood 08/10/2020 Negative  NEG   Final    Urobilinogen 08/10/2020 0.2  0.2 - 1.0 EU/dL Final    Nitrites 08/10/2020 Negative  NEG   Final    Leukocyte Esterase 08/10/2020 Negative  NEG   Final    WBC 08/10/2020 0-4  0 - 4 /hpf Final    RBC 08/10/2020 0-5  0 - 5 /hpf Final    Epithelial cells 08/10/2020 FEW  FEW /lpf Final    Epithelial cell category consists of squamous cells and /or transitional urothelial cells. Renal tubular cells, if present, are separately identified as such.     Bacteria 08/10/2020 Negative  NEG /hpf Final    UA:UC IF INDICATED 08/10/2020 CULTURE NOT INDICATED BY UA RESULT  CNI   Final    Hyaline cast 08/10/2020 0-2  0 - 5 /lpf Final    Sodium 08/10/2020 139  136 - 145 mmol/L Final    Potassium 08/10/2020 3.9  3.5 - 5.1 mmol/L Final    Chloride 08/10/2020 106  97 - 108 mmol/L Final    CO2 08/10/2020 28  21 - 32 mmol/L Final    Anion gap 08/10/2020 5  5 - 15 mmol/L Final    Glucose 08/10/2020 101* 65 - 100 mg/dL Final    BUN 08/10/2020 20  6 - 20 MG/DL Final    Creatinine 08/10/2020 1.07* 0.55 - 1.02 MG/DL Final    BUN/Creatinine ratio 08/10/2020 19  12 - 20   Final    GFR est AA 08/10/2020 >60  >60 ml/min/1.73m2 Final    GFR est non-AA 08/10/2020 50* >60 ml/min/1.73m2 Final    Comment: Estimated GFR is calculated using the IDMS-traceable Modification of Diet in Renal Disease (MDRD) Study equation, reported for both  Americans (GFRAA) and non- Americans (GFRNA), and normalized to 1.73m2 body surface area. The physician must decide which value applies to the patient. The MDRD study equation should only be used in individuals age 25 or older. It has not been validated for the following: pregnant women, patients with serious comorbid conditions, or on certain medications, or persons with extremes of body size, muscle mass, or nutritional status.  Calcium 08/10/2020 9.6  8.5 - 10.1 MG/DL Final    Bilirubin, total 08/10/2020 0.9  0.2 - 1.0 MG/DL Final    ALT (SGPT) 08/10/2020 19  12 - 78 U/L Final    AST (SGOT) 08/10/2020 20  15 - 37 U/L Final    Alk. phosphatase 08/10/2020 84  45 - 117 U/L Final    Protein, total 08/10/2020 7.7  6.4 - 8.2 g/dL Final    Albumin 08/10/2020 4.1  3.5 - 5.0 g/dL Final    Globulin 08/10/2020 3.6  2.0 - 4.0 g/dL Final    A-G Ratio 08/10/2020 1.1  1.1 - 2.2   Final          Skin:   Denies open wounds, cuts, sores, rashes or other areas of concern in PAT assessment.         Katie Diaz NP

## 2020-08-13 ENCOUNTER — HOSPITAL ENCOUNTER (OUTPATIENT)
Dept: PREADMISSION TESTING | Age: 75
Discharge: HOME | End: 2020-08-13
Payer: MEDICARE

## 2020-08-13 PROCEDURE — 87635 SARS-COV-2 COVID-19 AMP PRB: CPT

## 2020-08-14 LAB
HEALTH STATUS, XMCV2T: NORMAL
SARS-COV-2, COV2NT: NOT DETECTED
SOURCE, COVRS: NORMAL
SPECIMEN SOURCE, FCOV2M: NORMAL
SPECIMEN TYPE, XMCV1T: NORMAL

## 2020-08-14 NOTE — H&P
Mirna Dhillon MD - Adult Reconstruction and Total Joint Replacement     Orthopaedic History and Physical        NAME: Sarah Read       :  1945       MRN:  [id number]        Subjective:   Patient ID: Dl Alcantara is a 66 y.o. female. Chief Complaint: Follow-up of the Right Knee  Presents today for follow-up of her right knee. Unfortunately, she really got nothing out of the injection in February. She is having to use a cane. She has pain with any weight-bearing activities and has rest pain as well. She complains of pain that interferes with sleep to some degree. Pain is localized medially. Patient Active Problem List    Diagnosis Date Noted    [facility]estive [facility] failure) [facility] 2013     Past Medical History:   Diagnosis Date    Arrhythmia     AFIB    Arthritis     Asthma     CAD (coronary artery disease)     Diabetes ([address]     Heart failure (HCC)     Hepatitis     AGE 8     High cholesterol     Hypertension     Other ill-defined conditions(799.89)     heart palpitations    Psychiatric disorder     depression    Spinal stenosis     Thromboembolus ([address]     PULMONARY EMBOLIS IN BOTH LUNGS    Vitreous detachment of left eye     BLIND IN LEFT EYE      Past Surgical History:   Procedure Laterality Date    COLONOSCOPY N/A 4/3/2019    COLON[facility] performed by Mary Garland MD at [facility]; HI RISK IND  4/3/2019         HX CATARACT REMOVAL Bilateral     HX HYSTERECTOMY      HX OTHER SURGICAL      foot left    HX TONSILLECTOMY        Prior to Admission medications    Medication Sig Start Date End Date Taking? Authorizing Provider   rivaroxaban (Xarelto) 20 mg tab tablet Take 20 mg by mouth daily (with dinner). Provider, Historical   ferrous sulfate (Iron) 325 mg (65 mg iron) tablet Take 65 mg by mouth every other day. Provider, Historical   furosemide (Lasix) 40 mg tablet Take 40 mg by mouth daily.  1 tablet daily and may take extra tablet as needed in the afternoon for edema    Provider, Historical   nebivoloL (Bystolic) 2.5 mg tablet Take 2.5 mg by mouth nightly. Provider, Historical   docusate sodium (Stool Softener) 100 mg capsule Take 100 mg by mouth two (2) times a day. Provider, Historical   levalbuterol tartrate (XOPENEX) 45 mcg/actuation inhaler INHALE 1 PUFF BY MOUTH EVERY 4 HOURS AS NEEDED 11/16/18   Provider, Historical   losartan (COZAAR) 50 mg tablet Take 25 mg by mouth daily. 1/2 tablet daily    Provider, Historical   furosemide (LASIX) 20 mg tablet 1 tab every other day as needed as per cardiology  Patient taking differently: Take 40 mg by mouth daily as needed. 1 tablet daily and may take extra tablet as needed in the afternoon for edema 12/26/13   Domi Joe MD   Blood-Glucose Meter monitoring kit With testing strips & lancets 1 month supply 12/26/13   Doris MONTIEL MD   gemfibrozil (LOPID) 600 mg tablet Take 600 mg by mouth two (2) times a day. Junior Mcconnell MD   diltiazem hcl 180 mg Tb24 Take 1 Tab by mouth nightly. Provider, Historical   sitaGLIPtin (JANUVIA) 100 mg tablet Take 100 mg by mouth daily. Provider, Historical   buPROPion SR (WELLBUTRIN SR) 150 mg SR tablet Take 150 mg by mouth two (2) times a day. Other, Phys, MD   mometasone [facility]) 220 mcg (30 doses) inhalation capsule Take 1 Cap by inhalation two (2) times a day. Junior Mcconnell MD   montelukast (SINGULAIR) 10 mg tablet Take 10 mg by mouth nightly.     Junior Mcconnell MD     Allergies   Allergen Reactions    Baclofen Other (comments)     Hallucinations    Albuterol Palpitations    Bactrim [Sulfamethoxazole-Trimethoprim] Not Reported This Time    Diuretic Softgels [Pamabrom] Other (comments)     Lowers potassium, very sensitive      Iodine Unknown (comments)    Lipitor [Atorvastatin] Myalgia    Shellfish Containing Products Unknown (comments)     \"got choked, It might have been the spices\"    Tizanidine Unknown (comments)      Social History     Tobacco Use    Smoking status: Never Smoker    Smokeless tobacco: Never Used   Substance Use Topics    Alcohol use: No      Family History   Problem Relation Age of Onset    Cancer Father     Stroke Father     Heart Disease Paternal Grandfather     Heart Failure Paternal Grandfather     Stroke Paternal Grandfather         REVIEW OF SYSTEMS: A comprehensive review of systems was negative except for that written in the HPI. Objective:   Constitutional: She appears stated age. Pt is cooperative and is in no acute distress. Well nourished. Well developed. Body habitus is overweight. Body mass index is 27.44 kg/m². Gait / Assistive devices: Severely antalgic on the right with a cane. Eyes: Sclera are nonicteric. Respiratory: No labored breathing. Cardiovascular: No marked edema. Well perfused extremities bilaterally. Skin: No marked skin ulcers. No lymphedema or skin abnormalities. Neurological: No marked sensory loss noted. Grossly neurovascularly intact. Both lower extremities are intact to distal sensory and motor function. Psychiatric: Alert and oriented x3. MUSCULOSKELETAL: Right lower extremity shows correctable varus alignment. She has full extension flexion greater than 120 degrees. Severe medial joint line tenderness. Mild effusion. No significant lateral or patellofemoral pain. Good quad strength. No ligamentous instability. Negative straight leg raise. Imaging:   Previous films show end-stage medial compartment arthritis with minimal changes elsewhere. Assessment:     ICD-10-CM   1. Primary osteoarthritis of right knee M17.11     Plan: At this time, I recommended partial knee replacement. Surgery was discussed at length with the patient today. We went over all of the pertinent risks, benefits, and alternatives to the procedure. They understand no guarantees can be given about the outcome and would like to proceed.  All questions were answered to her satisfaction. Follow up following surgery.        ELMER Guardado

## 2020-08-16 ENCOUNTER — ANESTHESIA EVENT (OUTPATIENT)
Dept: SURGERY | Age: 75
End: 2020-08-16
Payer: MEDICARE

## 2020-08-17 ENCOUNTER — HOSPITAL ENCOUNTER (OUTPATIENT)
Age: 75
Setting detail: OBSERVATION
Discharge: HOME | End: 2020-08-17
Attending: ORTHOPAEDIC SURGERY | Admitting: ORTHOPAEDIC SURGERY
Payer: MEDICARE

## 2020-08-17 ENCOUNTER — ANESTHESIA (OUTPATIENT)
Dept: SURGERY | Age: 75
End: 2020-08-17
Payer: MEDICARE

## 2020-08-17 VITALS
TEMPERATURE: 98.1 F | BODY MASS INDEX: 28.89 KG/M2 | HEART RATE: 88 BPM | WEIGHT: 156.97 LBS | SYSTOLIC BLOOD PRESSURE: 120 MMHG | RESPIRATION RATE: 18 BRPM | HEIGHT: 62 IN | OXYGEN SATURATION: 98 % | DIASTOLIC BLOOD PRESSURE: 71 MMHG

## 2020-08-17 DIAGNOSIS — Z96.651 STATUS POST RIGHT PARTIAL KNEE REPLACEMENT: Primary | ICD-10-CM

## 2020-08-17 LAB
GLUCOSE BLD STRIP.AUTO-MCNC: 125 MG/DL (ref 65–100)
GLUCOSE BLD STRIP.AUTO-MCNC: 153 MG/DL (ref 65–100)
GLUCOSE BLD STRIP.AUTO-MCNC: 207 MG/DL (ref 65–100)
GLUCOSE BLD STRIP.AUTO-MCNC: 232 MG/DL (ref 65–100)
SERVICE CMNT-IMP: ABNORMAL

## 2020-08-17 PROCEDURE — 77030031139 HC SUT VCRL2 J&J -A: Performed by: ORTHOPAEDIC SURGERY

## 2020-08-17 PROCEDURE — 77030013708 HC HNDPC SUC IRR PULS STRY –B: Performed by: ORTHOPAEDIC SURGERY

## 2020-08-17 PROCEDURE — 99218 HC RM OBSERVATION: CPT

## 2020-08-17 PROCEDURE — 77030022704 HC SUT VLOC COVD -B: Performed by: ORTHOPAEDIC SURGERY

## 2020-08-17 PROCEDURE — 77030008684 HC TU ET CUF COVD -B

## 2020-08-17 PROCEDURE — 74011250636 HC RX REV CODE- 250/636

## 2020-08-17 PROCEDURE — 77030033067 HC SUT PDO STRATFX SPIR J&J -B: Performed by: ORTHOPAEDIC SURGERY

## 2020-08-17 PROCEDURE — 74011000250 HC RX REV CODE- 250

## 2020-08-17 PROCEDURE — 77030019707 HC TBNG LAVG CRBJT KINM -C: Performed by: ORTHOPAEDIC SURGERY

## 2020-08-17 PROCEDURE — 82962 GLUCOSE BLOOD TEST: CPT

## 2020-08-17 PROCEDURE — 77030018846 HC SOL IRR STRL H20 ICUM -A: Performed by: ORTHOPAEDIC SURGERY

## 2020-08-17 PROCEDURE — 77030034479 HC ADH SKN CLSR PRINEO J&J -B: Performed by: ORTHOPAEDIC SURGERY

## 2020-08-17 PROCEDURE — 77030013079 HC BLNKT BAIR HGGR 3M -A

## 2020-08-17 PROCEDURE — 77030041680 HC PNCL ELECSURG SMK EVAC CNMD -B: Performed by: ORTHOPAEDIC SURGERY

## 2020-08-17 PROCEDURE — 77030018836 HC SOL IRR NACL ICUM -A: Performed by: ORTHOPAEDIC SURGERY

## 2020-08-17 PROCEDURE — 74011000250 HC RX REV CODE- 250: Performed by: PHYSICIAN ASSISTANT

## 2020-08-17 PROCEDURE — 76210000006 HC OR PH I REC 0.5 TO 1 HR: Performed by: ORTHOPAEDIC SURGERY

## 2020-08-17 PROCEDURE — 77030006812 HC BLD SAW RECIP STRY -B: Performed by: ORTHOPAEDIC SURGERY

## 2020-08-17 PROCEDURE — 77030039497 HC CST PAD STERILE CHCS -A: Performed by: ORTHOPAEDIC SURGERY

## 2020-08-17 PROCEDURE — 74011250636 HC RX REV CODE- 250/636: Performed by: ANESTHESIOLOGY

## 2020-08-17 PROCEDURE — 74011250636 HC RX REV CODE- 250/636: Performed by: PHYSICIAN ASSISTANT

## 2020-08-17 PROCEDURE — 97530 THERAPEUTIC ACTIVITIES: CPT

## 2020-08-17 PROCEDURE — 97161 PT EVAL LOW COMPLEX 20 MIN: CPT

## 2020-08-17 PROCEDURE — 77030008552 HC TBNG SMK EVAC BFLF -A: Performed by: ORTHOPAEDIC SURGERY

## 2020-08-17 PROCEDURE — 77030011640 HC PAD GRND REM COVD -A: Performed by: ORTHOPAEDIC SURGERY

## 2020-08-17 PROCEDURE — 77030000032 HC CUF TRNQT ZIMM -B: Performed by: ORTHOPAEDIC SURGERY

## 2020-08-17 PROCEDURE — 74011250637 HC RX REV CODE- 250/637: Performed by: ORTHOPAEDIC SURGERY

## 2020-08-17 PROCEDURE — 76060000033 HC ANESTHESIA 1 TO 1.5 HR: Performed by: ORTHOPAEDIC SURGERY

## 2020-08-17 PROCEDURE — 77030006835 HC BLD SAW SAG STRY -B: Performed by: ORTHOPAEDIC SURGERY

## 2020-08-17 PROCEDURE — 74011250636 HC RX REV CODE- 250/636: Performed by: ORTHOPAEDIC SURGERY

## 2020-08-17 PROCEDURE — 77030010785: Performed by: ORTHOPAEDIC SURGERY

## 2020-08-17 PROCEDURE — 77030026438 HC STYL ET INTUB CARD -A

## 2020-08-17 PROCEDURE — 77030040361 HC SLV COMPR DVT MDII -B: Performed by: ORTHOPAEDIC SURGERY

## 2020-08-17 PROCEDURE — 76010000161 HC OR TIME 1 TO 1.5 HR INTENSV-TIER 1: Performed by: ORTHOPAEDIC SURGERY

## 2020-08-17 PROCEDURE — C1776 JOINT DEVICE (IMPLANTABLE): HCPCS | Performed by: ORTHOPAEDIC SURGERY

## 2020-08-17 PROCEDURE — 77030033138 HC SUT PGA STRATFX J&J -B: Performed by: ORTHOPAEDIC SURGERY

## 2020-08-17 PROCEDURE — C1713 ANCHOR/SCREW BN/BN,TIS/BN: HCPCS | Performed by: ORTHOPAEDIC SURGERY

## 2020-08-17 PROCEDURE — 77030040922 HC BLNKT HYPOTHRM STRY -A

## 2020-08-17 PROCEDURE — 97116 GAIT TRAINING THERAPY: CPT

## 2020-08-17 PROCEDURE — 74011250637 HC RX REV CODE- 250/637: Performed by: PHYSICIAN ASSISTANT

## 2020-08-17 PROCEDURE — 74011000250 HC RX REV CODE- 250: Performed by: ORTHOPAEDIC SURGERY

## 2020-08-17 DEVICE — COBALT HV BONE CEMENT 40GM
Type: IMPLANTABLE DEVICE | Site: KNEE | Status: FUNCTIONAL
Brand: DJO SURGICAL

## 2020-08-17 DEVICE — KNEE K1 TOT HEMI STD CEM IMPL CAPPED K1 ZIM: Type: IMPLANTABLE DEVICE | Status: FUNCTIONAL

## 2020-08-17 RX ORDER — SODIUM CHLORIDE 0.9 % (FLUSH) 0.9 %
5-40 SYRINGE (ML) INJECTION AS NEEDED
Status: DISCONTINUED | OUTPATIENT
Start: 2020-08-17 | End: 2020-08-17 | Stop reason: HOSPADM

## 2020-08-17 RX ORDER — SODIUM CHLORIDE, SODIUM LACTATE, POTASSIUM CHLORIDE, CALCIUM CHLORIDE 600; 310; 30; 20 MG/100ML; MG/100ML; MG/100ML; MG/100ML
25 INJECTION, SOLUTION INTRAVENOUS CONTINUOUS
Status: DISCONTINUED | OUTPATIENT
Start: 2020-08-17 | End: 2020-08-17 | Stop reason: HOSPADM

## 2020-08-17 RX ORDER — LIDOCAINE HYDROCHLORIDE 10 MG/ML
0.1 INJECTION, SOLUTION EPIDURAL; INFILTRATION; INTRACAUDAL; PERINEURAL AS NEEDED
Status: DISCONTINUED | OUTPATIENT
Start: 2020-08-17 | End: 2020-08-17 | Stop reason: HOSPADM

## 2020-08-17 RX ORDER — DEXAMETHASONE SODIUM PHOSPHATE 4 MG/ML
10 INJECTION, SOLUTION INTRA-ARTICULAR; INTRALESIONAL; INTRAMUSCULAR; INTRAVENOUS; SOFT TISSUE ONCE
Status: DISCONTINUED | OUTPATIENT
Start: 2020-08-18 | End: 2020-08-17 | Stop reason: HOSPADM

## 2020-08-17 RX ORDER — ONDANSETRON 2 MG/ML
4 INJECTION INTRAMUSCULAR; INTRAVENOUS
Status: DISCONTINUED | OUTPATIENT
Start: 2020-08-17 | End: 2020-08-17 | Stop reason: HOSPADM

## 2020-08-17 RX ORDER — NALOXONE HYDROCHLORIDE 0.4 MG/ML
0.4 INJECTION, SOLUTION INTRAMUSCULAR; INTRAVENOUS; SUBCUTANEOUS AS NEEDED
Status: DISCONTINUED | OUTPATIENT
Start: 2020-08-17 | End: 2020-08-17 | Stop reason: HOSPADM

## 2020-08-17 RX ORDER — CELECOXIB 200 MG/1
400 CAPSULE ORAL ONCE
Status: COMPLETED | OUTPATIENT
Start: 2020-08-17 | End: 2020-08-17

## 2020-08-17 RX ORDER — ACETAMINOPHEN 325 MG/1
650 TABLET ORAL ONCE
Status: DISCONTINUED | OUTPATIENT
Start: 2020-08-17 | End: 2020-08-17 | Stop reason: HOSPADM

## 2020-08-17 RX ORDER — ONDANSETRON 4 MG/1
4 TABLET, ORALLY DISINTEGRATING ORAL
Status: DISCONTINUED | OUTPATIENT
Start: 2020-08-19 | End: 2020-08-17 | Stop reason: HOSPADM

## 2020-08-17 RX ORDER — OXYCODONE HYDROCHLORIDE 5 MG/1
10 TABLET ORAL
Status: DISCONTINUED | OUTPATIENT
Start: 2020-08-17 | End: 2020-08-17 | Stop reason: HOSPADM

## 2020-08-17 RX ORDER — SODIUM CHLORIDE 0.9 % (FLUSH) 0.9 %
5-40 SYRINGE (ML) INJECTION EVERY 8 HOURS
Status: DISCONTINUED | OUTPATIENT
Start: 2020-08-17 | End: 2020-08-17 | Stop reason: HOSPADM

## 2020-08-17 RX ORDER — FAMOTIDINE 20 MG/1
20 TABLET, FILM COATED ORAL DAILY
Status: DISCONTINUED | OUTPATIENT
Start: 2020-08-17 | End: 2020-08-17 | Stop reason: HOSPADM

## 2020-08-17 RX ORDER — MORPHINE SULFATE 2 MG/ML
2 INJECTION, SOLUTION INTRAMUSCULAR; INTRAVENOUS
Status: DISCONTINUED | OUTPATIENT
Start: 2020-08-17 | End: 2020-08-17 | Stop reason: HOSPADM

## 2020-08-17 RX ORDER — ONDANSETRON 2 MG/ML
INJECTION INTRAMUSCULAR; INTRAVENOUS AS NEEDED
Status: DISCONTINUED | OUTPATIENT
Start: 2020-08-17 | End: 2020-08-17 | Stop reason: HOSPADM

## 2020-08-17 RX ORDER — PREGABALIN 75 MG/1
75 CAPSULE ORAL ONCE
Status: COMPLETED | OUTPATIENT
Start: 2020-08-17 | End: 2020-08-17

## 2020-08-17 RX ORDER — OXYCODONE HYDROCHLORIDE 5 MG/1
5 TABLET ORAL
Qty: 20 TAB | Refills: 0 | Status: SHIPPED | OUTPATIENT
Start: 2020-08-17 | End: 2020-08-31

## 2020-08-17 RX ORDER — DEXAMETHASONE SODIUM PHOSPHATE 4 MG/ML
INJECTION, SOLUTION INTRA-ARTICULAR; INTRALESIONAL; INTRAMUSCULAR; INTRAVENOUS; SOFT TISSUE AS NEEDED
Status: DISCONTINUED | OUTPATIENT
Start: 2020-08-17 | End: 2020-08-17 | Stop reason: HOSPADM

## 2020-08-17 RX ORDER — ACETAMINOPHEN 325 MG/1
650 TABLET ORAL EVERY 6 HOURS
Status: DISCONTINUED | OUTPATIENT
Start: 2020-08-17 | End: 2020-08-17 | Stop reason: HOSPADM

## 2020-08-17 RX ORDER — SODIUM CHLORIDE 9 MG/ML
125 INJECTION, SOLUTION INTRAVENOUS CONTINUOUS
Status: DISCONTINUED | OUTPATIENT
Start: 2020-08-17 | End: 2020-08-17 | Stop reason: HOSPADM

## 2020-08-17 RX ORDER — NEOSTIGMINE METHYLSULFATE 1 MG/ML
INJECTION, SOLUTION INTRAVENOUS AS NEEDED
Status: DISCONTINUED | OUTPATIENT
Start: 2020-08-17 | End: 2020-08-17 | Stop reason: HOSPADM

## 2020-08-17 RX ORDER — PHENYLEPHRINE HCL IN 0.9% NACL 0.4MG/10ML
SYRINGE (ML) INTRAVENOUS AS NEEDED
Status: DISCONTINUED | OUTPATIENT
Start: 2020-08-17 | End: 2020-08-17 | Stop reason: HOSPADM

## 2020-08-17 RX ORDER — FENTANYL CITRATE 50 UG/ML
INJECTION, SOLUTION INTRAMUSCULAR; INTRAVENOUS AS NEEDED
Status: DISCONTINUED | OUTPATIENT
Start: 2020-08-17 | End: 2020-08-17 | Stop reason: HOSPADM

## 2020-08-17 RX ORDER — FACIAL-BODY WIPES
10 EACH TOPICAL DAILY PRN
Status: DISCONTINUED | OUTPATIENT
Start: 2020-08-19 | End: 2020-08-17 | Stop reason: HOSPADM

## 2020-08-17 RX ORDER — ROCURONIUM BROMIDE 10 MG/ML
INJECTION, SOLUTION INTRAVENOUS AS NEEDED
Status: DISCONTINUED | OUTPATIENT
Start: 2020-08-17 | End: 2020-08-17 | Stop reason: HOSPADM

## 2020-08-17 RX ORDER — HYDROMORPHONE HYDROCHLORIDE 2 MG/ML
0.2 INJECTION, SOLUTION INTRAMUSCULAR; INTRAVENOUS; SUBCUTANEOUS
Status: DISCONTINUED | OUTPATIENT
Start: 2020-08-17 | End: 2020-08-17 | Stop reason: HOSPADM

## 2020-08-17 RX ORDER — SUCCINYLCHOLINE CHLORIDE 20 MG/ML
INJECTION INTRAMUSCULAR; INTRAVENOUS AS NEEDED
Status: DISCONTINUED | OUTPATIENT
Start: 2020-08-17 | End: 2020-08-17 | Stop reason: HOSPADM

## 2020-08-17 RX ORDER — ONDANSETRON 2 MG/ML
4 INJECTION INTRAMUSCULAR; INTRAVENOUS AS NEEDED
Status: DISCONTINUED | OUTPATIENT
Start: 2020-08-17 | End: 2020-08-17 | Stop reason: HOSPADM

## 2020-08-17 RX ORDER — POLYETHYLENE GLYCOL 3350 17 G/17G
17 POWDER, FOR SOLUTION ORAL DAILY
Qty: 14 PACKET | Refills: 0 | Status: SHIPPED
Start: 2020-08-18 | End: 2020-09-01

## 2020-08-17 RX ORDER — ROPIVACAINE HYDROCHLORIDE 5 MG/ML
INJECTION, SOLUTION EPIDURAL; INFILTRATION; PERINEURAL AS NEEDED
Status: DISCONTINUED | OUTPATIENT
Start: 2020-08-17 | End: 2020-08-17 | Stop reason: HOSPADM

## 2020-08-17 RX ORDER — POLYETHYLENE GLYCOL 3350 17 G/17G
17 POWDER, FOR SOLUTION ORAL DAILY
Status: DISCONTINUED | OUTPATIENT
Start: 2020-08-17 | End: 2020-08-17 | Stop reason: HOSPADM

## 2020-08-17 RX ORDER — ACETAMINOPHEN 500 MG
1000 TABLET ORAL ONCE
Status: COMPLETED | OUTPATIENT
Start: 2020-08-17 | End: 2020-08-17

## 2020-08-17 RX ORDER — LIDOCAINE HYDROCHLORIDE 20 MG/ML
INJECTION, SOLUTION EPIDURAL; INFILTRATION; INTRACAUDAL; PERINEURAL AS NEEDED
Status: DISCONTINUED | OUTPATIENT
Start: 2020-08-17 | End: 2020-08-17 | Stop reason: HOSPADM

## 2020-08-17 RX ORDER — ACETAMINOPHEN 500 MG
1000 TABLET ORAL
COMMUNITY

## 2020-08-17 RX ORDER — MIDAZOLAM HYDROCHLORIDE 1 MG/ML
1 INJECTION, SOLUTION INTRAMUSCULAR; INTRAVENOUS AS NEEDED
Status: DISCONTINUED | OUTPATIENT
Start: 2020-08-17 | End: 2020-08-17 | Stop reason: HOSPADM

## 2020-08-17 RX ORDER — FENTANYL CITRATE 50 UG/ML
50 INJECTION, SOLUTION INTRAMUSCULAR; INTRAVENOUS AS NEEDED
Status: DISCONTINUED | OUTPATIENT
Start: 2020-08-17 | End: 2020-08-17 | Stop reason: HOSPADM

## 2020-08-17 RX ORDER — PROPOFOL 10 MG/ML
INJECTION, EMULSION INTRAVENOUS AS NEEDED
Status: DISCONTINUED | OUTPATIENT
Start: 2020-08-17 | End: 2020-08-17 | Stop reason: HOSPADM

## 2020-08-17 RX ORDER — PROPOFOL 10 MG/ML
VIAL (ML) INTRAVENOUS
Status: DISCONTINUED | OUTPATIENT
Start: 2020-08-17 | End: 2020-08-17 | Stop reason: HOSPADM

## 2020-08-17 RX ORDER — ROPIVACAINE HYDROCHLORIDE 5 MG/ML
INJECTION, SOLUTION EPIDURAL; INFILTRATION; PERINEURAL
Status: COMPLETED | OUTPATIENT
Start: 2020-08-17 | End: 2020-08-17

## 2020-08-17 RX ORDER — DIPHENHYDRAMINE HCL 12.5MG/5ML
12.5 LIQUID (ML) ORAL
Status: DISCONTINUED | OUTPATIENT
Start: 2020-08-17 | End: 2020-08-17 | Stop reason: HOSPADM

## 2020-08-17 RX ORDER — AMOXICILLIN 250 MG
1 CAPSULE ORAL 2 TIMES DAILY
Status: DISCONTINUED | OUTPATIENT
Start: 2020-08-17 | End: 2020-08-17 | Stop reason: HOSPADM

## 2020-08-17 RX ORDER — SODIUM CHLORIDE, SODIUM LACTATE, POTASSIUM CHLORIDE, CALCIUM CHLORIDE 600; 310; 30; 20 MG/100ML; MG/100ML; MG/100ML; MG/100ML
50 INJECTION, SOLUTION INTRAVENOUS CONTINUOUS
Status: DISCONTINUED | OUTPATIENT
Start: 2020-08-17 | End: 2020-08-17 | Stop reason: HOSPADM

## 2020-08-17 RX ORDER — FENTANYL CITRATE 50 UG/ML
25 INJECTION, SOLUTION INTRAMUSCULAR; INTRAVENOUS
Status: DISCONTINUED | OUTPATIENT
Start: 2020-08-17 | End: 2020-08-17 | Stop reason: HOSPADM

## 2020-08-17 RX ORDER — GLYCOPYRROLATE 0.2 MG/ML
INJECTION INTRAMUSCULAR; INTRAVENOUS AS NEEDED
Status: DISCONTINUED | OUTPATIENT
Start: 2020-08-17 | End: 2020-08-17 | Stop reason: HOSPADM

## 2020-08-17 RX ORDER — MIDAZOLAM HYDROCHLORIDE 1 MG/ML
INJECTION, SOLUTION INTRAMUSCULAR; INTRAVENOUS AS NEEDED
Status: DISCONTINUED | OUTPATIENT
Start: 2020-08-17 | End: 2020-08-17 | Stop reason: HOSPADM

## 2020-08-17 RX ORDER — OXYCODONE HYDROCHLORIDE 5 MG/1
5 TABLET ORAL
Status: DISCONTINUED | OUTPATIENT
Start: 2020-08-17 | End: 2020-08-17 | Stop reason: HOSPADM

## 2020-08-17 RX ADMIN — MIDAZOLAM HYDROCHLORIDE 1 MG: 1 INJECTION, SOLUTION INTRAMUSCULAR; INTRAVENOUS at 07:10

## 2020-08-17 RX ADMIN — WATER 2 G: 1 INJECTION INTRAMUSCULAR; INTRAVENOUS; SUBCUTANEOUS at 07:37

## 2020-08-17 RX ADMIN — Medication 10 ML: at 13:40

## 2020-08-17 RX ADMIN — DOCUSATE SODIUM 50MG AND SENNOSIDES 8.6MG 1 TABLET: 8.6; 5 TABLET, FILM COATED ORAL at 13:39

## 2020-08-17 RX ADMIN — POLYETHYLENE GLYCOL 3350 17 G: 17 POWDER, FOR SOLUTION ORAL at 13:39

## 2020-08-17 RX ADMIN — PROPOFOL 42 MCG/KG/MIN: 10 INJECTION, EMULSION INTRAVENOUS at 07:48

## 2020-08-17 RX ADMIN — SODIUM CHLORIDE, SODIUM LACTATE, POTASSIUM CHLORIDE, AND CALCIUM CHLORIDE 25 ML/HR: 600; 310; 30; 20 INJECTION, SOLUTION INTRAVENOUS at 06:30

## 2020-08-17 RX ADMIN — FAMOTIDINE 20 MG: 20 TABLET, FILM COATED ORAL at 13:39

## 2020-08-17 RX ADMIN — Medication 3 AMPULE: at 06:30

## 2020-08-17 RX ADMIN — ROCURONIUM BROMIDE 17 MG: 10 INJECTION INTRAVENOUS at 07:47

## 2020-08-17 RX ADMIN — ONDANSETRON HYDROCHLORIDE 4 MG: 2 INJECTION, SOLUTION INTRAMUSCULAR; INTRAVENOUS at 07:55

## 2020-08-17 RX ADMIN — ROPIVACAINE HYDROCHLORIDE 20 ML: 5 INJECTION, SOLUTION EPIDURAL; INFILTRATION; PERINEURAL at 07:17

## 2020-08-17 RX ADMIN — Medication 40 MCG: at 08:10

## 2020-08-17 RX ADMIN — Medication 40 MCG: at 08:21

## 2020-08-17 RX ADMIN — WATER 2 G: 1 INJECTION INTRAMUSCULAR; INTRAVENOUS; SUBCUTANEOUS at 15:34

## 2020-08-17 RX ADMIN — Medication 40 MCG: at 07:42

## 2020-08-17 RX ADMIN — Medication 1000 MG: at 06:30

## 2020-08-17 RX ADMIN — PREGABALIN 75 MG: 75 CAPSULE ORAL at 06:30

## 2020-08-17 RX ADMIN — ROCURONIUM BROMIDE 3 MG: 10 INJECTION INTRAVENOUS at 07:42

## 2020-08-17 RX ADMIN — FENTANYL CITRATE 50 MCG: 50 INJECTION, SOLUTION INTRAMUSCULAR; INTRAVENOUS at 07:10

## 2020-08-17 RX ADMIN — SODIUM CHLORIDE 125 ML/HR: 900 INJECTION, SOLUTION INTRAVENOUS at 08:53

## 2020-08-17 RX ADMIN — PROPOFOL 100 MG: 10 INJECTION, EMULSION INTRAVENOUS at 07:42

## 2020-08-17 RX ADMIN — LIDOCAINE HYDROCHLORIDE 20 MG: 20 INJECTION, SOLUTION EPIDURAL; INFILTRATION; INTRACAUDAL; PERINEURAL at 07:42

## 2020-08-17 RX ADMIN — FENTANYL CITRATE 50 MCG: 50 INJECTION, SOLUTION INTRAMUSCULAR; INTRAVENOUS at 07:54

## 2020-08-17 RX ADMIN — CELECOXIB 400 MG: 200 CAPSULE ORAL at 06:30

## 2020-08-17 RX ADMIN — SODIUM CHLORIDE, POTASSIUM CHLORIDE, SODIUM LACTATE AND CALCIUM CHLORIDE: 600; 310; 30; 20 INJECTION, SOLUTION INTRAVENOUS at 07:37

## 2020-08-17 RX ADMIN — Medication 3 MG: at 08:32

## 2020-08-17 RX ADMIN — SUCCINYLCHOLINE CHLORIDE 100 MG: 20 INJECTION, SOLUTION INTRAMUSCULAR; INTRAVENOUS at 07:42

## 2020-08-17 RX ADMIN — ACETAMINOPHEN 650 MG: 325 TABLET ORAL at 13:38

## 2020-08-17 RX ADMIN — GLYCOPYRROLATE 0.4 MG: 0.2 INJECTION, SOLUTION INTRAMUSCULAR; INTRAVENOUS at 08:32

## 2020-08-17 RX ADMIN — DEXAMETHASONE SODIUM PHOSPHATE 4 MG: 4 INJECTION, SOLUTION INTRAMUSCULAR; INTRAVENOUS at 07:55

## 2020-08-17 NOTE — DISCHARGE INSTRUCTIONS
Discharge Instructions: How to [facility]  Surgery: Total Knee Replacement  Surgeon:   Rhonda Brito MD  Surgery Date:  8/17/2020     To relieve pain:   Use ice/gel packs.  Put the ice pack directly over the wound, or anywhere you are hurting or swollen.  To control pain and swelling, keep ice on regularly, especially after physical activity.  The packs should stay cold for 3-4 hours. When it is not cold anymore, rotate with the packs in the freezer.  Elevate your leg. This will also keep swelling down.  Rest for at least 20 minutes between activity or exercises.  To keep track of your pain medications, write down what you take and when you take it.  The last dose of pain medication you got in the hospital was:       Medication    Dose    Date & Time           Choose your medications based on the pain scale below:     To keep your pain under control, take Tylenol every 6 hours for 14 days - even if you feel like you dont need it.  For mild to moderate pain (4-6 on pain scale), take one pain pill every 3-4 hours as needed.  For severe pain (7-10 on pain scale), take two pain pills every 3-4 hours as needed.  To prevent nausea, take your pain medications with food. Pain Scale                 As your pain lessens:     Slowly start taking less pain medication. You may do this by waiting longer between doses or by taking smaller doses.  Stop using the pain medications as soon as you no longer need it, usually in 2-3 weeks.  Xarelto   To prevent blood clots, you will need to continue to take Xarelto.  To prevent stomach upset or bleeding:   Do not take non-steroidal anti-inflammatory medications (Ibuprofen, Advil, Motrin, Naproxen, etc.)    Take Pepcid 20 mg twice a day, or a similar home medication, while you are taking a blood thinner. OPSITE (Honeycomb dressing)     Keep your clear, waterproof dressing in place for 5-7 days after your leave the hospital.     If you are still having drainage, you will need to change your dressing in 5-7 days. You will be given one extra dressing to use at home.  If there is no more drainage from the wound, you may leave it open to the air.  You will have some swelling, warmth, and bruising around the knee and up and down the leg after surgery. This will may get worse in the first few days you are home and will slowly get better over the next few weeks. OPSITE DRESSING INSTRUCTIONS                  PRINEO DRESSING INSTRUCTIONS     Prineo is a type of skin glue and mesh that is keeping your wound together.  Leave this covering alone. Do not peel it off.  Do not apply oils or lotions over it.  You may shower with this dressing but do not soak it. Gently pat your wound dry when you get out of the shower.  DO NOTs:   Do not rub your surgical wound   Do not put lotion or oils on your wound.  Do not take a tub bath or go swimming until your doctor says it is ok.  You may shower with this dressing over your wound.  After showering pat the dressing dry.  If you have staples a home health nurse will remove them in about 10 days.  To increase and promote healing:   Stop Smoking (or at least cut back on       Smoking).  Eat a well-balanced diet (high in protein       and vitamin C).  If you have a poor appetite, drink Ensure, Glucerna, or         [place] Instant Breakfast for the next 30 days.  If you are diabetic, you should control your blood         sugars to prevent infection and help your wound         to heal.                     f you have a poor appetite, drink Ensure, Glucerna, or [place] Instant Breakfast for the next 30 days.      If you are diabetic, you should control your blood                                                sugars to prevent infection and help your wound                                                to heal.     Prevent Infection    1. Wash your hands.  This is the most important thing you or your caregiver can do.  Wash your hands with soap and water (or use the hand  we gave you) before you touch any wounds. 2. Shower.  Use the antibacterial soap we gave you when you take a shower.  Shower with this soap until your wounds are healed. 3.  Use clean sheets.  Put freshly cleaned sheets on your bed after surgery.  To keep the surgery site clean, do not allow pets to sleep with you while your wound is still healing.  To prevent constipation, stay active and drink plenty of fluid.  While using pain medications, you should also take stool softeners and laxatives, such as Pericolace       and Miralax.  If you are having too many bowel       Movements, then you may need       to stop taking the laxatives.  You should have a bowel movement 3-4 days        after surgery and then at least every other day while       on pain medication.  To improve your recovery, you must be active!  Use your walker and take short walks (in your home)         about every 2 hours during the day.  Try to increase how far you walk each day.  You can put as much weight on your leg as you can tolerate while walking.  Wear knee immobilizer when out of bed/weight-bearing activity        To avoid getting a stiff knee, work on getting your knee bent and straight as soon as possible.  Home health physical therapy will come to your       home the day after you leave the hospital.  The       therapist will visit about 4 times over the next        couple of weeks to teach you how to get out of        bed, to safely walk in your home, and to do your        exercises.       NO DRIVING until your surgeon tells you it is ok.  You can return to work when cleared by a physician.  Please call your physician immediately if you have:     Constant bleeding from your wound.  Increasing redness or swelling around your wound (Some warmth, bruising and swelling is normal).  Change in wound drainage (increase in amount, color, or bad smell).  Change in mental status (unusual behavior   Temperature over 101.5 degrees Fahrenheit      Pain or redness in the calf (back of your lower leg - see picture)     Increased swelling of the thigh, ankle, calf, or foot.  Emergency: CALL 911 if you have:     Shortness of breath     Chest pain when you cough or taking a deep breath     Please call your surgeons office at [phone] for a follow up appointment. _   You should call as soon as you get home or the next day after discharge. Ask to make an appointment in 2 weeks.  If you have questions or concerns during normal business hours, you may reach Dr. Otoniel Alaniz' Team at [phone].

## 2020-08-17 NOTE — ANESTHESIA POSTPROCEDURE EVALUATION
Post-Anesthesia Evaluation and Assessment    Patient: Ketan Hopkins MRN: [id number]  SSN: [id number]    YOB: 1945  Age: 66 y.o. Sex: female      I have evaluated the patient and they are stable and ready for discharge from the PACU. Cardiovascular Function/Vital Signs  Visit Vitals  /53   Pulse 71   Temp 36.5 °C (97.7 °F)   Resp 21   Ht 5' 2\" (1.575 m)   Wt 71.2 kg (156 lb 15.5 oz)   SpO2 99%   BMI 28.71 kg/m²       Patient is status post General anesthesia for Procedure(s):  RIGHT UNICONDYLAR KNEE ARTHROPLASTY. Nausea/Vomiting: None    Postoperative hydration reviewed and adequate. Pain:  Pain Scale 1: Numeric (0 - 10) (08/17/20 0915)  Pain Intensity 1: 2 (08/17/20 0915)   Managed    Neurological Status:   Neuro (WDL): Exceptions to [facility] (08/17/20 5952)  Neuro  Neurologic State: Drowsy; Eyes open to voice (08/17/20 0851)  Orientation Level: Oriented to person (08/17/20 0851)  Cognition: Follows commands (08/17/20 0851)  Speech: Clear (08/17/20 0851)  LUE Motor Response: Purposeful (08/17/20 0851)  LLE Motor Response: Purposeful (08/17/20 0851)  RUE Motor Response: Purposeful (08/17/20 0851)  RLE Motor Response: Purposeful (08/17/20 0851)   At baseline    Mental Status, Level of Consciousness: Alert and  oriented to person, place, and time    Pulmonary Status:   O2 Device: Nasal cannula (08/17/20 0851)   Adequate oxygenation and airway patent    Complications related to anesthesia: None    Post-anesthesia assessment completed.  No concerns    Signed By: Kan Wright MD     August 17, 2020

## 2020-08-17 NOTE — ANESTHESIA PREPROCEDURE EVALUATION
Relevant Problems   No relevant active problems       Anesthetic History   No history of anesthetic complications            Review of Systems / Medical History  Patient summary reviewed, nursing notes reviewed and pertinent labs reviewed    Pulmonary  Within defined limits          Asthma        Neuro/Psych         Psychiatric history    Comments: Depression Cardiovascular    Hypertension      CHF  Dysrhythmias : atrial fibrillation  CAD      Comments: Palpitations    TTE (12/24/13):  EF=55-60%  Hx of PE   GI/Hepatic/Renal           Liver disease    Comments: H/O Colon Polyps Endo/Other    Diabetes: well controlled, type 2         Other Findings              Physical Exam    Airway  Mallampati: II  TM Distance: 4 - 6 cm  Neck ROM: normal range of motion   Mouth opening: Normal     Cardiovascular  Regular rate and rhythm,  S1 and S2 normal,  no murmur, click, rub, or gallop             Dental    Dentition: Implants and Caps/crowns  Comments: Lower implants   Pulmonary  Breath sounds clear to auscultation               Abdominal  GI exam deferred       Other Findings            Anesthetic Plan    ASA: 3  Anesthesia type: general and regional - saphenous block          Induction: Intravenous  Anesthetic plan and risks discussed with: Patient

## 2020-08-17 NOTE — PERIOP NOTES
Handoff Report from Operating Room to PACU    Report received from Marii Ffoana CRNA and Lavell Sacks, RN regarding Sonja Dominique. Surgeon(s):  Harini Sahu MD  And Procedure(s) (LRB):  RIGHT UNICONDYLAR KNEE ARTHROPLASTY (Right)  confirmed   with dressings discussed. Anesthesia type, drugs, patient history, complications, estimated blood loss, vital signs, intake and output, and last pain medication, lines, reversal medications and temperature were reviewed.

## 2020-08-17 NOTE — PROGRESS NOTES
FUNCTIONAL STATUS PRIOR TO ADMISSION: Patient was modified independent using a single point cane for functional mobility. HOME SUPPORT PRIOR TO ADMISSION: The patient lived with  to provide assistance. Physical Therapy Goals  Initiated 8/17/2020    1. Patient will move from supine to sit and sit to supine  in bed with independence within 4 days. 2. Patient will perform sit to stand with modified independence within 4 days. 3. Patient will ambulate with modified independence for 300 feet with the least restrictive device within 4 days. 4. Patient will ascend/descend 8 stairs with right handrail with modified independence within 4 days. 5. Patient will perform home exercise program per protocol with independence within 4 days. 6. Patient will demonstrate AROM 0-90 degrees in operative joint within 4 days. PHYSICAL THERAPY EVALUATION  Patient: Nito Borden (13 y.o. female)  Date: 8/17/2020  Primary Diagnosis: Status post right partial knee replacement [Z96.651]  Procedure(s) (LRB):  RIGHT UNICONDYLAR KNEE ARTHROPLASTY (Right) Day of Surgery   Precautions:  WBAT    ASSESSMENT  Based on the objective data described below, the patient presents with decreased mobility, function, strength and balance. Patient was eager and willing to participate in therapy. Patient was able to perform all bed mobility independently. Patient required a rolling walker and contact guard of 1 to to perform transfers and ambulate. While ambulating, patient became distracted which caused her knee to buckle. She required total assist of 1 in order not to fall. Discussed with patient and ortho NP and educated patient on safety, distractions, and fall precautions. Current Level of Function Impacting Discharge (mobility/balance): Contact guard x 1    Functional Outcome Measure: The patient scored 70 on the Barthel outcome measure which is indicative of decreased function.       Other factors to consider for discharge: Knee alber during ambulation when distracted. Fall risk, impulsive. Patient will benefit from skilled therapy intervention to address the above noted impairments. PLAN :  Recommendations and Planned Interventions: transfer training, gait training, and therapeutic activities      Frequency/Duration: Patient will be followed by physical therapy:  twice daily to address goals. Recommendation for discharge: (in order for the patient to meet his/her long term goals)  Outpatient physical therapy follow up recommended for knee arthroplasty. Needs increased support from . This discharge recommendation:  Has not yet been discussed the attending provider and/or case management    IF patient discharges home will need the following DME: patient owns DME required for discharge         SUBJECTIVE:   Patient stated i'd like to go home.     OBJECTIVE DATA SUMMARY:   HISTORY:    Past Medical History:   Diagnosis Date    Arrhythmia     AFIB    Arthritis     Asthma     CAD (coronary artery disease)     Diabetes ([facility] [address]     Heart failure (HCC)     Hepatitis     AGE 8     High cholesterol     Hypertension     Other ill-defined conditions(799.89)     heart palpitations    Psychiatric disorder     depression    Spinal stenosis     Thromboembolus (HCC)     PULMONARY EMBOLIS IN BOTH LUNGS    Vitreous detachment of left eye     BLIND IN LEFT EYE     Past Surgical History:   Procedure Laterality Date    COLONOSCOPY N/A 4/3/2019    COLONOSCOPY performed by Velvet Green MD at [address] [facility]; HI RISK IND  4/3/2019         HX CATARACT REMOVAL Bilateral     HX HYSTERECTOMY      HX OTHER SURGICAL      foot left    HX TONSILLECTOMY         Personal factors and/or comorbidities impacting plan of care: Good support system at home    [address]: Private residence  # Steps to Enter: 5  Rails to Enter: Yes  Hand Rails : Bilateral  Wheelchair Ramp: No  One/Two Story Residence: Two story  # of Interior Steps: 14  Interior Rails: Right  Lift Chair Available: No  Living Alone: No  Support Systems: Family member(s), Friends \ neighbors, [place] / [place] community  Patient Expects to be Discharged to[de-identified] Private residence  Current DME Used/Available at Home: Severiano Kimball, rolling, Cane, straight  Tub or Shower Type: Tub/Shower combination    EXAMINATION/PRESENTATION/DECISION MAKING:   Critical Behavior:  Neurologic State: Drowsy, Eyes open spontaneously  Orientation Level: Oriented to person, Oriented to place, Oriented to situation  Cognition: Follows commands     Hearing: Auditory  Auditory Impairment: None  Hearing Aids/Status: Does not own    Range Of Motion:  AROM: Generally decreased, functional           PROM: Generally decreased, functional           Strength:    Strength: Generally decreased, functional                    Tone & Sensation:                                  Coordination:  Coordination: Generally decreased, functional  Vision:      Functional Mobility:  Bed Mobility:  Rolling: Independent  Supine to Sit: Independent  Sit to Supine: Independent  Scooting: Independent  Transfers:  Sit to Stand: Assist x1;Contact guard assistance  Stand to Sit: Contact guard assistance        Bed to Chair: Contact guard assistance;Assist x1              Balance:   Sitting: Intact; Without support  Standing: Impaired; With support  Standing - Static: Good  Standing - Dynamic : Fair  Ambulation/Gait Training:  Distance (ft): 400 Feet (ft)  Assistive Device: Walker, rollator  Ambulation - Level of Assistance: Contact guard assistance;Assist x1     Gait Description (WDL): Exceptions to WDL  Gait Abnormalities: Decreased step clearance  Right Side Weight Bearing: As tolerated  Left Side Weight Bearing: Full  Base of Support: Shift to left;Center of gravity altered  Stance: Right decreased  Speed/Sarita: Pace decreased (<100 feet/min)  Step Length: Right lengthened;Left shortened        Stairs:  Number of Stairs Trained: 8Ascended/descended 8 stairs utilizing bilateral and right handrail. Stairs - Level of Assistance: Contact guard assistance;Assist X1   Rail Use: Both    Functional Measure:  Barthel Index:    Bathin  Bladder: 10  Bowels: 10  Groomin  Dressin  Feeding: 10  Mobility: 10  Stairs: 5  Toilet Use: 5  Transfer (Bed to Chair and Back): 10  Total: 70/100       The Barthel ADL Index: Guidelines  1. The index should be used as a record of what a patient does, not as a record of what a patient could do. 2. The main aim is to establish degree of independence from any help, physical or verbal, however minor and for whatever reason. 3. The need for supervision renders the patient not independent. 4. A patient's performance should be established using the best available evidence. Asking the patient, friends/relatives and nurses are the usual sources, but direct observation and common sense are also important. However direct testing is not needed. 5. Usually the patient's performance over the preceding 24-48 hours is important, but occasionally longer periods will be relevant. 6. Middle categories imply that the patient supplies over 50 per cent of the effort. 7. Use of aids to be independent is allowed. Nona Crimes., Barthel, DMarilynW. (8074). Functional evaluation: the Barthel Index. [facility] (14)2. RONAK Lopes, Lili Ferrer., Gabriella Coburn., [place], [address] [place] [address] (). Measuring the change indisability after inpatient rehabilitation; comparison of the responsiveness of the Barthel Index and Functional [place] Measure. Journal of Neurology, Neurosurgery, and Psychiatry, 66(4), 397-658. Rhina Rasheed, N.[place].A, ANDREI Curran, & Sue Garcia MMarilynA. (2004.) Assessment of post-stroke quality of life in cost-effectiveness studies: The usefulness of the Barthel Index and the EuroQoL-5D.  Quality of Life Research, 13, 421-39           Physical Therapy Evaluation Charge Determination   History Examination Presentation Decision-Making   MEDIUM  Complexity : 1-2 comorbidities / personal factors will impact the outcome/ POC  MEDIUM Complexity : 3 Standardized tests and measures addressing body structure, function, activity limitation and / or participation in recreation  MEDIUM Complexity : Evolving with changing characteristics  Other outcome measures Barthel  MEDIUM      Based on the above components, the patient evaluation is determined to be of the following complexity level: MEDIUM    Activity Tolerance:   Good  Please refer to the flowsheet for vital signs taken during this treatment. After treatment patient left in no apparent distress:   Supine in bed, Call bell within reach, and Side rails x 3    COMMUNICATION/EDUCATION:   The patients plan of care was discussed with: Occupational therapist, Registered nurse, and Case management. Patient/family have participated as able in goal setting and plan of care.     Thank you for this referral.  Chan Streeter   Time Calculation: 46 mins

## 2020-08-17 NOTE — OP NOTES
Savannah Mchugh MD - Adult Reconstruction and Total Joint Replacement      Duane Night - MRN [id number] - : 1945 (66 y.o.)    Date: 2020    PREOPERATIVE DIAGNOSIS:  Right medial knee degenerative joint disease    POSTOPERATIVE DIAGNOSIS:  Same    PROCEDURE:  Right medial unicompartmental knee replacement    Implants Used:   Harry Persona UKA  Femur: 4  Tibia: D  Poly: 9mm  Implant Name Type Inv. Item Serial No.  Lot No. LRB No. Used Action   CEMENT BNE [place] 40/20GM - SN/A  CEMENT BNE [place]BALT 40/20GM N/A [facility] [id number] Right 1 Implanted   PERSONA PARTIAL FEMUR RIGHT MEDIAL SIZE 4   N/A [employer] [id number] Right 1 Implanted   PERSONA PARTIAL TIBIAL SIZE D RIGHT MEDIAL   N/A [employer] [id number] Right 1 Implanted   PERSONA PARTIAL ARTICULAR SURFACE SIZE D 9MM THICKNESS RIGHT MEDIAL   N/A [employer] [id number] Right 1 Implanted       Anesthesia: GETA + local    Pre-operative antibiotic: Ancef    Surgeon: Savannah Mchugh     Assist: ELMER Cr (Performing all or most of the following assistant-at-surgery services including but not limited to: proper patient positioning, sterile/prep and draping, placement of instruments/trackers, operative exposure, minor portions of bone / soft tissue excision, final irrigation and debridement, deep and superficial closure, application of final dressings)    EBL: minimal    Drains: none     Specimens: none     Complications: none     Condition: stable to PACU     INDICATIONS: Longstanding knee pain unresponsive to conservative measures. The risks, benefits, and alternatives to the procedure were explained to the patient and they wished to proceed. They understood no guarantees could be given about the outcome of the procedure. DESCRIPTION OF PROCEDURE:  The patient was brought into the operating room and placed supine on a standard OR table. Anesthesia was provided by the anesthesia team without difficulty. A thigh tourniquet was applied to the operative limb which was then prepped and draped in the usual fashion. Pre-operative antibiotics were administered. An appropriate time-out was performed. The leg was exsanguinated and the tourniquet inflated. We made a small medial parapatellar approach. A portion of the fat pad and medial meniscus were excised. We thoroughly examined the entire knee. The patient was felt to be a good candidate for unicompartmental knee replacement and we elected to proceed. We began by placing the tibial cutting jig. This was pinned into place and the medial tibial resection performed. We then checked our resection gap and pinned the distal femoral resection guide. The distal femoral resection was performed and gaps checked. We then sized the femur and placed the finishing guide. Posterior and chamfer cuts were made and lugs drilled. We sized the tibia and placed a trial. We drilled for the lugs of the tibial tray and placed trial implants. Additional cleanup was performed and multiple drill holes placed in the cut surfaces for cement interdigitation. All cut surfaces were thoroughly lavaged and dried. Final implants were cemented into place and excess cement was removed. We then snapped the final spacer in to place. The knee was again taken through range of motion and found to be well balanced with good range of motion and no impingement. We thoroughly lavaged and closed in routine fashion. Periarticular injection was performed. A sterile compressive dressing was applied. The patient was awakened, moved to the stretcher, and taken to the recovery room in stable condition. At the conclusion of the procedure, all counts were correct. There were no immediate complications.

## 2020-08-17 NOTE — PROGRESS NOTES
Ortho / Neurosurgery NP Note    POD# 0  s/p RIGHT UNICONDYLAR KNEE ARTHROPLASTY   Pt seen with family at bedside. Pt resting in bed, eating lunch. Reports \"soreness\" in right knee, tolerable. Tolerating regular diet, denies N/V.    VSS Afebrile. 2L NC- wean as tolerated. Visit Vitals  /76 (BP 1 Location: Right arm, BP Patient Position: At rest;Supine)   Pulse 89   Temp 98.1 °F (36.7 °C)   Resp 18   Ht 5' 2\" (1.575 m)   Wt 71.2 kg (156 lb 15.5 oz)   SpO2 100%   BMI 28.71 kg/m²       Voiding status: due to void  Output (mL)  Last Bowel Movement Date: 08/16/20 (08/17/20 0615)  Unmeasurable Output  Urine Occurrence(s): 1 (08/17/20 0615)      Labs    Lab Results   Component Value Date/Time    HGB 10.7 (L) 08/10/2020 10:43 AM      Lab Results   Component Value Date/Time    INR 1.4 (H) 08/10/2020 10:43 AM      Lab Results   Component Value Date/Time    Sodium 139 08/10/2020 10:43 AM    Potassium 3.9 08/10/2020 10:43 AM    Chloride 106 08/10/2020 10:43 AM    CO2 28 08/10/2020 10:43 AM    Glucose 101 (H) 08/10/2020 10:43 AM    BUN 20 08/10/2020 10:43 AM    Creatinine 1.07 (H) 08/10/2020 10:43 AM    Calcium 9.6 08/10/2020 10:43 AM     Recent Glucose Results:   Lab Results   Component Value Date/Time    GLUCPOC 207 (H) 08/17/2020 11:34 AM    GLUCPOC 125 (H) 08/17/2020 08:45 AM    GLUCPOC 153 (H) 08/17/2020 06:13 AM           Body mass index is 28.71 kg/m². : A BMI > 30 is classified as obesity and > 40 is classified as morbid obesity. Gauzed and ace wrap dressing c.d.i - underlying prineo (not well visualized)  Cryotherapy in place over incision  Calves soft and supple; No pain with passive stretch  Sensation and motor intact - PF/DF/EHL intact 5/5. Reports numbness in right foot. SCDs for mechanical DVT proph while in bed     PLAN:  1) PT BID - WBAT  2) Pt on Xaretlo PTA for hx Afib, and PE.  Resume at discharge for DVT Prophylaxis   3) GI Prophylaxis - Pepcid  4) Readniess for discharge:     [x] Vital Signs stable    [] Hgb stable    [] + Voiding    [x] Wound intact, drainage minimal    [x] Tolerating PO intake     [] Cleared by PT (OT if applicable)     [] Stair training completed (if applicable)    [] Independent / Contact Guard Assist (household distance)     [] Bed mobility     [] Car transfers     [] ADLs    [x] Adequate pain control on oral medication alone     Would like to return home today if able to meet discharge goals. Plans to return home with family and plans to begin OP therapy tomorrow morning. Addedum @ 17:30 - Rounded with Dr Trevon Pang, made aware patient has been oob twice both times experiencing significant buckling per RN and PT. Quad weakness noted on exam. Patient strongly desires to go home. Instructed to wear knee immobilizer during any weight-bearing activity and ensure someone is there for assistance. Rx sent to patient's pharmacy.      Bertin Damon NP

## 2020-08-17 NOTE — ROUTINE PROCESS
Reviewed discharge instructions with patient and her . Immobilizer placed on patient.  stated he understands how to take immobilizer on and off. IV access removed. Patient's discharge instructions, belongings with . Patient is being transported to private vehicle for discharge home with the assistance of PCT. Aramis Martines RN

## 2020-08-17 NOTE — DISCHARGE SUMMARY
Ortho Discharge Summary    Patient ID:  Amish Cleveland  [id number]  female  66 y.o.  1945    Admit date: 8/17/2020    Discharge date: 8/17/2020    Admitting Physician: Raissa Verdugo MD     Consulting Physician(s):   Treatment Team: Attending Provider: Vidal Cassidy MD; Primary Nurse: Fuentes Marcelino RN    Date of Surgery:   8/17/2020     Preoperative Diagnosis:  RIGHT KNEE OSTEOARTHRITIS    Postoperative Diagnosis:   RIGHT KNEE OSTEOARTHRITIS    Procedure(s):   RIGHT UNICONDYLAR KNEE ARTHROPLASTY     Anesthesia Type:   General     Surgeon: Vidal Cassidy MD                            HPI:  Pt is a 66 y.o. female who has a history of RIGHT KNEE OSTEOARTHRITIS  with pain and limitations of activities of daily living who presents at this time for a RIGHT UNICONDYLAR KNEE ARTHROPLASTY following the failure of conservative management. PMH:   Past Medical History:   Diagnosis Date    Arrhythmia     AFIB    Arthritis     Asthma     CAD (coronary artery disease)     Diabetes ([address]     Heart failure (HCC)     Hepatitis     AGE 8     High cholesterol     Hypertension     Other ill-defined conditions(799.89)     heart palpitations    Psychiatric disorder     depression    Spinal stenosis     Thromboembolus (HCC)     PULMONARY EMBOLIS IN BOTH LUNGS    Vitreous detachment of left eye     BLIND IN LEFT EYE       Body mass index is 28.71 kg/m². : A BMI > 30 is classified as obesity and > 40 is classified as morbid obesity. Medications upon admission :   Prior to Admission Medications   Prescriptions Last Dose Informant Patient Reported? Taking? Blood-Glucose Meter monitoring kit   No No   Sig: With testing strips & lancets 1 month supply   acetaminophen (Tylenol Extra Strength) 500 mg tablet 8/16/2020 at 2100  Yes Yes   Sig: Take 1,000 mg by mouth every six (6) hours as needed for Pain.    buPROPion SR (WELLBUTRIN SR) 150 mg SR tablet 8/17/2020 at 0400  Yes Yes   Sig: Take 150 mg by mouth two (2) times a day. diltiazem hcl 180 mg Tb24 2020 at 0400  Yes Yes   Sig: Take 1 Tab by mouth nightly. docusate sodium (Stool Softener) 100 mg capsule 8/10/2020 at Unknown time  Yes Yes   Sig: Take 100 mg by mouth two (2) times a day. ferrous sulfate (Iron) 325 mg (65 mg iron) tablet 2020  Yes No   Sig: Take 65 mg by mouth every other day. furosemide (LASIX) 20 mg tablet 8/15/2020  No No   Si tab every other day as needed as per cardiology   Patient taking differently: Take 40 mg by mouth daily as needed. 1 tablet daily and may take extra tablet as needed in the afternoon for edema   furosemide (Lasix) 40 mg tablet   Yes No   Sig: Take 40 mg by mouth daily. 1 tablet daily and may take extra tablet as needed in the afternoon for edema   gemfibrozil (LOPID) 600 mg tablet 2020 at 0800  Yes Yes   Sig: Take 600 mg by mouth two (2) times a day. levalbuterol tartrate (XOPENEX) 45 mcg/actuation inhaler   Yes No   Sig: INHALE 1 PUFF BY MOUTH EVERY 4 HOURS AS NEEDED   losartan (COZAAR) 50 mg tablet 2020 at 0800  Yes Yes   Sig: Take 25 mg by mouth daily. 1/2 tablet daily   mometasone (ASMANEX TWISTHALER) 220 mcg (30 doses) inhalation capsule 2020 at Unknown time  Yes Yes   Sig: Take 1 Cap by inhalation two (2) times a day. montelukast (SINGULAIR) 10 mg tablet 8/15/2020  Yes No   Sig: Take 10 mg by mouth nightly. nebivoloL (Bystolic) 2.5 mg tablet  at 2030  Yes Yes   Sig: Take 2.5 mg by mouth nightly. rivaroxaban (Xarelto) 20 mg tab tablet 2020  Yes No   Sig: Take 20 mg by mouth daily (with dinner). sitaGLIPtin (JANUVIA) 100 mg tablet 2020 at 0800  Yes Yes   Sig: Take 100 mg by mouth daily. Facility-Administered Medications: None        Allergies:     Allergies   Allergen Reactions    Baclofen Other (comments)     Hallucinations    Albuterol Palpitations    Bactrim [Sulfamethoxazole-Trimethoprim] Not Reported This Time    Diuretic Softgels [Pamabrom] Other (comments)     Lowers potassium, very sensitive      Iodine Unknown (comments)    Lipitor [Atorvastatin] Myalgia    Shellfish Containing Products Unknown (comments)     \"got choked, It might have been the spices\"    Tizanidine Unknown (comments)        Hospital Course: The patient underwent surgery. Complications:  None; patient tolerated the procedure well. Was taken to the PACU in stable condition and then transferred to the ortho floor. Perioperative Antibiotics:  Ancef     Postoperative Pain Management:  Oxycodone & Tylenol     DVT Prophylaxis: Xarelto    Postoperative transfusions:    Number of units banked PRBCs =   none     Post Op complications: none    Hemoglobin at discharge:    Lab Results   Component Value Date/Time    HGB 10.7 (L) 08/10/2020 10:43 AM    INR 1.4 (H) 08/10/2020 10:43 AM       Dressing remained clean, dry and intact. No significant erythema or swelling. Wound appears to be healing without any evidence of infection. Neurovascular exam found to be within normal limits. Physical Therapy started following surgery and participated in bed mobility, transfers and ambulation. Gait:  Gait  Base of Support: Shift to left, Center of gravity altered  Speed/Sarita: Pace decreased (<100 feet/min)  Step Length: Right lengthened, Left shortened  Stance: Right decreased  Gait Abnormalities: Decreased step clearance  Ambulation - Level of Assistance: Contact guard assistance, Assist x1  Distance (ft): 400 Feet (ft)  Assistive Device: Walker, rollator  Rail Use: Both  Stairs - Level of Assistance: Contact guard assistance, Assist X1  Number of Stairs Trained: 8                   Discharged to: Home. Condition on Discharge:   stable    Discharge instructions:  - Anticoagulate with Xarelto  - Take pain medications as prescribed  - Resume pre hospital diet      - Discharge activity: activity as tolerated  - Ambulate with assistive device as needed.   - Weight bearing status - WBAT  - KNEE IMMOBILIZER DURING WEIGHT-BEARING ACTIVITY  - Wound Care Keep wound clean and dry. See discharge instruction sheet. -DISCHARGE MEDICATION LIST     Current Discharge Medication List      START taking these medications    Details   oxyCODONE IR (ROXICODONE) 5 mg immediate release tablet Take 1 Tab by mouth every four (4) hours as needed for Pain for up to 14 days. Max Daily Amount: 30 mg.  Qty: 20 Tab, Refills: 0    Associated Diagnoses: Status post right partial knee replacement      polyethylene glycol (MIRALAX) 17 gram packet Take 1 Packet by mouth daily for 14 days. Qty: 14 Packet, Refills: 0         CONTINUE these medications which have CHANGED    Details   rivaroxaban (Xarelto) 20 mg tab tablet Take 1 Tab by mouth daily (with dinner). Resume on 8/18/20  Qty: 30 Tab, Refills: 0         CONTINUE these medications which have NOT CHANGED    Details   acetaminophen (Tylenol Extra Strength) 500 mg tablet Take 1,000 mg by mouth every six (6) hours as needed for Pain. nebivoloL (Bystolic) 2.5 mg tablet Take 2.5 mg by mouth nightly. docusate sodium (Stool Softener) 100 mg capsule Take 100 mg by mouth two (2) times a day. losartan (COZAAR) 50 mg tablet Take 25 mg by mouth daily. 1/2 tablet daily      gemfibrozil (LOPID) 600 mg tablet Take 600 mg by mouth two (2) times a day. diltiazem hcl 180 mg Tb24 Take 1 Tab by mouth nightly. sitaGLIPtin (JANUVIA) 100 mg tablet Take 100 mg by mouth daily. buPROPion SR (WELLBUTRIN SR) 150 mg SR tablet Take 150 mg by mouth two (2) times a day. mometasone (ASMANEX TWISTHALER) 220 mcg (30 doses) inhalation capsule Take 1 Cap by inhalation two (2) times a day. ferrous sulfate (Iron) 325 mg (65 mg iron) tablet Take 65 mg by mouth every other day. !! furosemide (Lasix) 40 mg tablet Take 40 mg by mouth daily.  1 tablet daily and may take extra tablet as needed in the afternoon for edema      levalbuterol tartrate (XOPENEX) 45 mcg/actuation inhaler INHALE 1 PUFF BY MOUTH EVERY 4 HOURS AS NEEDED  Refills: 3      !! furosemide (LASIX) 20 mg tablet 1 tab every other day as needed as per cardiology  Qty: 30 Tab, Refills: 0      Blood-Glucose Meter monitoring kit With testing strips & lancets 1 month supply  Qty: 1 Kit, Refills: 0      montelukast (SINGULAIR) 10 mg tablet Take 10 mg by mouth nightly. !! - Potential duplicate medications found. Please discuss with provider.        per medical continuation form      -Follow up in office in 2 weeks  -OP therapy tomorrow       Signed:  David Grajeda NP  Orthopaedic Nurse Practitioner    8/17/2020  5:56 PM

## 2020-08-17 NOTE — PERIOP NOTES
Patient was tested for COVID on Wednesday. Patients results show COVID not detected. Patient stated she has quarantined at home since her test and denies any signs or symptoms. 8712: Adductor canal block done by Dr. Juan Ramon Silveira without complications. Patient resting comfortably, vital signs stable. Will continue to monitor.

## 2020-08-17 NOTE — ROUTINE PROCESS
Attempted to ambulate patient to bathroom, patient's right knee buckled multiple times. Will return patient to bed by wheelchair and notify NP and therapy.

## 2020-08-17 NOTE — ANESTHESIA PROCEDURE NOTES
Peripheral Block    Start time: 8/17/2020 7:17 AM  Performed by: Darylene Furlough, MD  Authorized by: Darylene Furlough, MD       Pre-procedure: Indications: at surgeon's request and post-op pain management    Preanesthetic Checklist: patient identified, risks and benefits discussed, site marked, timeout performed and patient being monitored    Timeout Time: 07:17          Block Type:   Block Type:   Adductor canal  Laterality:  Right  Monitoring:  Standard ASA monitoring, continuous pulse ox, frequent vital sign checks, heart rate, responsive to questions and oxygen  Injection Technique:  Single shot  Procedures: ultrasound guided    Patient Position: supine  Prep: chlorhexidine    Location:  Mid thigh  Needle Type:  Stimuplex  Needle Gauge:  21 G  Needle Localization:  Ultrasound guidance and anatomical landmarks    Assessment:  Number of attempts:  1  Injection Assessment:  Incremental injection every 5 mL, local visualized surrounding nerve on ultrasound, negative aspiration for blood, no paresthesia, no intravascular symptoms and ultrasound image on chart  Patient tolerance:  Patient tolerated the procedure well with no immediate complications

## 2020-08-17 NOTE — PERIOP NOTES
TRANSFER - OUT REPORT:    Verbal report given to Lucrecia RN(name) on Gricelda Porter  being transferred to [facility](unit) for routine post - op       Report consisted of patients Situation, Background, Assessment and   Recommendations(SBAR). Information from the following report(s) OR Summary, Procedure Summary, Intake/Output and MAR was reviewed with the receiving nurse. Opportunity for questions and clarification was provided.       Patient transported with:   O2 @ 2 liters  Tech

## 2020-08-17 NOTE — H&P
Date of Surgery Update:  Sarah Read was seen and examined on the day of surgery prior to the procedure. There were no significant clinical changes since the completion of the History and Physical.    Exam today prior to surgery showed no acute cardiac findings, no respiratory difficulty, and no abdominal complaints or pain. This patient is not a candidate for TXA. The patient was counseled at length about the risks of sandeep Covid-19 during their perioperative period and any recovery window from their procedure. The patient was made aware that sandeep Covid-19  may worsen their prognosis for recovering from their procedure and lend to a higher morbidity and/or mortality risk. All material risks, benefits, and reasonable alternatives including postponing the procedure were discussed. The patient does wish to proceed with the procedure at this time. Documentation of Medical Necessity:    Symptoms: pain with activity and at rest, antalgia, interferes with ADLs    Conservative Treatment: activity modification, multiple medications, injection    Physical Findings: pain at joint line, antalgia on ambulation, crepitation    Imaging: significant OA, sclerosis and osteophytes medial knee    Indications:   Failure of conservative treatments with daily pain and functional limitations. Appropriate imaging demonstrating significant disease. Appropriate physical findings consistent with significant medial knee degenerative joint disease. All pertinent risks, benefits, and alternatives to operative management including continued conservative care were explained at length. The patient has elected to proceed with appropriately indicated and medically necessary unicompartmental joint arthroplasty. They understand no guarantees can be given about the outcome.       Signed By: ELMER Guardado     August 17, 2020 7:32 AM

## 2020-08-18 ENCOUNTER — PATIENT OUTREACH (OUTPATIENT)
Dept: CASE MANAGEMENT | Age: 75
End: 2020-08-18

## 2020-08-18 NOTE — PROGRESS NOTES
Patient in observation status at  [facility] on 20 and discharged on 20 for elective partial Rt. TKR. Patient was contacted within one business days of discharge. Top Discharge Challenges to be reviewed by the provider   Additional needs identified to be addressed with provider None  refer to goals  Discussed COVID-19 related testing which was available at this time. Test results were negative. Patient informed of results, if available? NA   Method of communication with provider : none       Advance Care Planning:   Does patient have an Advance Directive:  reviewed and current     Inpatient Readmission Risk score: NA  Was this a readmission? no   Patient stated reason for the admission: NA  Patients top risk factors for readmission: medical condition and post surgical pain  Interventions to address risk factors: education and support    Care Transition Nurse (CTN) contacted the patient by telephone to perform post hospital discharge assessment. Verified name and  with patient as identifiers. Provided introduction to self, and explanation of the CTN role. CTN reviewed discharge instructions, medical action plan and red flags with patient who verbalized understanding. Patient given an opportunity to ask questions and does not have any further questions or concerns at this time. The patient agrees to contact the PCP office for questions related to their healthcare. Medication reconciliation was performed with patient, who verbalizes understanding of administration of home medications. Advised obtaining a 90-day supply of all daily and as-needed medications.    Referral to Pharm D needed: no     Home Health/Outpatient orders at discharge: out patient PT with [facility]- first session on 20    Durable Medical Equipment ordered at discharge: none  Has knee immobilizer    Covid Risk Education    Patient has following risk factors of: heart failure, asthma, diabetes and HTN, AFib w/ PMH Qasim PE. Education provided regarding infection prevention, and signs and symptoms of COVID-19 and when to seek medical attention with patient who verbalized understanding. Discussed exposure protocols and quarantine From CDC: Are you at higher risk for severe illness?  and given an opportunity for questions and concerns. The patient agrees to contact the COVID-19 hotline 943-169-1191 or PCP office for questions related to COVID-19. For more information on steps you can take to protect yourself, see CDC's How to Protect Yourself     Patient/family/caregiver given information for [place] and agrees to enroll no  Patient's preferred e-mail: declines  Patient's preferred phone number: NA    Discussed follow-up appointments. If no appointment was previously scheduled, appointment scheduling offered: declined-will call herself. [facility] follow up appointment(s): No future appointments. Non-[facility] follow up appointment(s):   PCP- Dr. Pablo Thapa- Dr. Eliu Atwood- 2 weeks   Cardiology- Dr. Gertrude Villarreal for follow-up call in 10-14 days based on severity of symptoms and risk factors. CTN provided contact information for future needs. Goals Addressed                 This Visit's Progress       General     Reduce risk for hospitalization        8/19/20- received return call from Ms. Troy- she went to PT yesterday- knee was still numb- but today having significant pain with doing exercises- started using percocet- she is using ice continuously- PT told her she did not have to wear immobilizer brace at all. She is doing leg/foot exercises to help with DVT prevention. CTN explained that narcotics will often constipate- she said she is taking miralax daily. She has had reduced appetite since surgery- does not feel hungry, supplementing with ensure. Drinking plenty of fluids for hydration. Review of Triggers for AFib- she will monitor.   Updated care team with cardiology- she is going to call him today. Clarified that she does monitor weight daily- uses PRN dose of furosemide if she has weight gain -also takes daily dose of furosemide. CTN noted moist cough, \"raspy\" voice during phone call. Asked her to monitor. [facility]     8/18/20- reached spouse- they had just arrived to out pt PT - agreed to call CTN back.   [employer]

## 2020-09-20 ENCOUNTER — HOSPITAL ENCOUNTER (OUTPATIENT)
Dept: PREADMISSION TESTING | Age: 75
Discharge: HOME | End: 2020-09-20
Payer: MEDICARE

## 2020-09-20 PROCEDURE — 87635 SARS-COV-2 COVID-19 AMP PRB: CPT

## 2020-09-23 RX ORDER — ASPIRIN 81 MG/1
81 TABLET ORAL DAILY
COMMUNITY
End: 2021-04-22

## 2020-09-24 ENCOUNTER — ANESTHESIA EVENT (OUTPATIENT)
Dept: ENDOSCOPY | Age: 75
End: 2020-09-24
Payer: MEDICARE

## 2020-09-24 ENCOUNTER — HOSPITAL ENCOUNTER (OUTPATIENT)
Age: 75
Setting detail: OUTPATIENT SURGERY
Discharge: HOME | End: 2020-09-24
Attending: INTERNAL MEDICINE | Admitting: INTERNAL MEDICINE
Payer: MEDICARE

## 2020-09-24 ENCOUNTER — ANESTHESIA (OUTPATIENT)
Dept: ENDOSCOPY | Age: 75
End: 2020-09-24
Payer: MEDICARE

## 2020-09-24 VITALS
TEMPERATURE: 98 F | SYSTOLIC BLOOD PRESSURE: 137 MMHG | OXYGEN SATURATION: 97 % | DIASTOLIC BLOOD PRESSURE: 52 MMHG | HEART RATE: 70 BPM | WEIGHT: 150 LBS | BODY MASS INDEX: 27.6 KG/M2 | RESPIRATION RATE: 20 BRPM | HEIGHT: 62 IN

## 2020-09-24 PROCEDURE — 74011250636 HC RX REV CODE- 250/636

## 2020-09-24 PROCEDURE — 74011000250 HC RX REV CODE- 250

## 2020-09-24 PROCEDURE — 77030019988 HC FCPS ENDOSC DISP BSC -B: Performed by: INTERNAL MEDICINE

## 2020-09-24 PROCEDURE — 76040000019: Performed by: INTERNAL MEDICINE

## 2020-09-24 PROCEDURE — 76060000031 HC ANESTHESIA FIRST 0.5 HR: Performed by: INTERNAL MEDICINE

## 2020-09-24 PROCEDURE — 74011250636 HC RX REV CODE- 250/636: Performed by: INTERNAL MEDICINE

## 2020-09-24 PROCEDURE — 88305 TISSUE EXAM BY PATHOLOGIST: CPT

## 2020-09-24 PROCEDURE — 88342 IMHCHEM/IMCYTCHM 1ST ANTB: CPT

## 2020-09-24 PROCEDURE — 2709999900 HC NON-CHARGEABLE SUPPLY: Performed by: INTERNAL MEDICINE

## 2020-09-24 RX ORDER — DEXTROMETHORPHAN/PSEUDOEPHED 2.5-7.5/.8
1.2 DROPS ORAL
Status: DISCONTINUED | OUTPATIENT
Start: 2020-09-24 | End: 2020-09-24 | Stop reason: HOSPADM

## 2020-09-24 RX ORDER — NALOXONE HYDROCHLORIDE 0.4 MG/ML
0.4 INJECTION, SOLUTION INTRAMUSCULAR; INTRAVENOUS; SUBCUTANEOUS
Status: DISCONTINUED | OUTPATIENT
Start: 2020-09-24 | End: 2020-09-24 | Stop reason: HOSPADM

## 2020-09-24 RX ORDER — SODIUM CHLORIDE 9 MG/ML
INJECTION, SOLUTION INTRAVENOUS
Status: DISCONTINUED | OUTPATIENT
Start: 2020-09-24 | End: 2020-09-24 | Stop reason: HOSPADM

## 2020-09-24 RX ORDER — FLUMAZENIL 0.1 MG/ML
0.2 INJECTION INTRAVENOUS
Status: DISCONTINUED | OUTPATIENT
Start: 2020-09-24 | End: 2020-09-24 | Stop reason: HOSPADM

## 2020-09-24 RX ORDER — SODIUM CHLORIDE 9 MG/ML
100 INJECTION, SOLUTION INTRAVENOUS CONTINUOUS
Status: DISCONTINUED | OUTPATIENT
Start: 2020-09-24 | End: 2020-09-24 | Stop reason: HOSPADM

## 2020-09-24 RX ORDER — SODIUM CHLORIDE 0.9 % (FLUSH) 0.9 %
5-40 SYRINGE (ML) INJECTION EVERY 8 HOURS
Status: DISCONTINUED | OUTPATIENT
Start: 2020-09-24 | End: 2020-09-24 | Stop reason: HOSPADM

## 2020-09-24 RX ORDER — MIDAZOLAM HYDROCHLORIDE 1 MG/ML
.25-5 INJECTION, SOLUTION INTRAMUSCULAR; INTRAVENOUS
Status: DISCONTINUED | OUTPATIENT
Start: 2020-09-24 | End: 2020-09-24 | Stop reason: HOSPADM

## 2020-09-24 RX ORDER — PROPOFOL 10 MG/ML
INJECTION, EMULSION INTRAVENOUS AS NEEDED
Status: DISCONTINUED | OUTPATIENT
Start: 2020-09-24 | End: 2020-09-24 | Stop reason: HOSPADM

## 2020-09-24 RX ORDER — ATROPINE SULFATE 0.1 MG/ML
0.5 INJECTION INTRAVENOUS
Status: DISCONTINUED | OUTPATIENT
Start: 2020-09-24 | End: 2020-09-24 | Stop reason: HOSPADM

## 2020-09-24 RX ORDER — EPINEPHRINE 0.1 MG/ML
1 INJECTION INTRACARDIAC; INTRAVENOUS
Status: DISCONTINUED | OUTPATIENT
Start: 2020-09-24 | End: 2020-09-24 | Stop reason: HOSPADM

## 2020-09-24 RX ORDER — OMEPRAZOLE 20 MG/1
20 CAPSULE, DELAYED RELEASE ORAL DAILY
Qty: 30 CAP | Refills: 5 | Status: SHIPPED | OUTPATIENT
Start: 2020-09-24

## 2020-09-24 RX ORDER — SODIUM CHLORIDE 0.9 % (FLUSH) 0.9 %
5-40 SYRINGE (ML) INJECTION AS NEEDED
Status: DISCONTINUED | OUTPATIENT
Start: 2020-09-24 | End: 2020-09-24 | Stop reason: HOSPADM

## 2020-09-24 RX ORDER — LIDOCAINE HYDROCHLORIDE 20 MG/ML
INJECTION, SOLUTION EPIDURAL; INFILTRATION; INTRACAUDAL; PERINEURAL AS NEEDED
Status: DISCONTINUED | OUTPATIENT
Start: 2020-09-24 | End: 2020-09-24 | Stop reason: HOSPADM

## 2020-09-24 RX ADMIN — SODIUM CHLORIDE 100 ML/HR: 900 INJECTION, SOLUTION INTRAVENOUS at 12:28

## 2020-09-24 RX ADMIN — SODIUM CHLORIDE: 900 INJECTION, SOLUTION INTRAVENOUS at 12:24

## 2020-09-24 RX ADMIN — PROPOFOL 50 MG: 10 INJECTION, EMULSION INTRAVENOUS at 12:39

## 2020-09-24 RX ADMIN — LIDOCAINE HYDROCHLORIDE 20 MG: 20 INJECTION, SOLUTION EPIDURAL; INFILTRATION; INTRACAUDAL; PERINEURAL at 12:25

## 2020-09-24 RX ADMIN — PROPOFOL 50 MG: 10 INJECTION, EMULSION INTRAVENOUS at 12:35

## 2020-09-24 RX ADMIN — PROPOFOL 50 MG: 10 INJECTION, EMULSION INTRAVENOUS at 12:31

## 2020-09-24 NOTE — PERIOP NOTES
Dinora Klein  1945  [id number]    Situation:  Verbal report received from: Violet Collins RN  Procedure: Procedure(s):  ESOPHAGOGASTRODUODENOSCOPY (EGD)  ESOPHAGOGASTRODUODENAL (EGD) BIOPSY    Background:    Preoperative diagnosis: HEME POSITIVE STOOL  ATRIAL FIBRILLATION  CURRNENT USE OF ASPIRIN  Postoperative diagnosis: Gastritis, Duodenal Ulcer    :  Dr. Jesus Singh  Assistant(s): Endoscopy RN-1: Boo Canales; Cain Hampton RN    Specimens:   ID Type Source Tests Collected by Time Destination   1 : Stomach Antrum Biopsy Preservative Stomach, Antrum  Greg An MD 9/24/2020 1240 Pathology   2 : Arpita Evans MD 9/24/2020 1242 Pathology     H. Pylori  no    Assessment:  Intra-procedure medications   Anesthesia gave intra-procedure sedation and medications, see anesthesia flow sheet yes    Intravenous fluids: NS@ KVO     Vital signs stable     Abdominal assessment: round and soft     Recommendation:  Discharge patient per MD order.   Family or Friend   Permission to share finding with family or friend yes

## 2020-09-24 NOTE — DISCHARGE INSTRUCTIONS
Ryder Tejeda MD  [facility], [address]  [place], [address] [facility]  [phone]  www. [facility]. [facility]  [id number]  1945    EGD DISCHARGE INSTRUCTIONS    Discomfort:  Sore throat- throat lozenges or warm salt water gargle  redness at IV site- apply warm compress to area; if redness or soreness persist- contact your physician  Gaseous discomfort- walking, belching will help relieve any discomfort  You may not operate a vehicle for 12 hours  You may not engage in an occupation involving machinery or appliances for rest of today  You may not drink alcoholic beverages for at least 12 hours  Avoid making any critical decisions for at least 24 hour  DIET  You may have anything by mouth. You may eat and drink immediately. You may resume your regular diet - however -  remember your colon is empty and a heavy meal will produce gas. Avoid these foods:  vegetables, fried / greasy foods, carbonated drinks      ACTIVITY  You may resume your normal daily activities   Spend the remainder of the day resting -  avoid any strenuous activity. CALL M.D. ANY SIGN OF   Increasing pain, nausea, vomiting  Abdominal distension (swelling)  New increased bleeding (oral or rectal)  Fever (chills)  Pain in chest area  Bloody discharge from nose or mouth  Shortness of breath    Follow-up Instructions:   Call Dr. Ryder Tejeda for any questions or problems. Telephone # [phone]  [facility] will notify you of the biopsy results available  Within 7 to 10 days. We will call you or send a letter   Start taking the omeprazole 20 mg daily in the morning 30 mins before breakfast.    ENDOSCOPY FINDINGS:   Your endoscopy showed one small ulcer in the duodenum, erosive gastritis with some old blood in the stomach and esophagitis.       DISCHARGE SUMMARY from Nurse    The following personal items collected during your admission are returned to you:   Dental Appliance: Dental Appliances:  At bedside  Vision: Visual Aid: Glasses(in patient belomging bag)  Hearing Aid:    Jewelry:    Clothing:    Other Valuables:    Valuables sent to safe:

## 2020-09-24 NOTE — PROGRESS NOTES
Endoscope was pre-cleaned at the bedside immediately following procedure by [facility]. Anesthesia reports 150mg Propofol, 20mg Lidocaine and 50mL NS given during procedure. Received report from anesthesia staff on vital signs and status of patient.

## 2020-09-24 NOTE — ANESTHESIA POSTPROCEDURE EVALUATION
Procedure(s):  ESOPHAGOGASTRODUODENOSCOPY (EGD)  ESOPHAGOGASTRODUODENAL (EGD) BIOPSY. MAC, total IV anesthesia    Anesthesia Post Evaluation        Patient location during evaluation: PACU  Note status: Adequate. Level of consciousness: responsive to verbal stimuli and sleepy but conscious  Pain management: satisfactory to patient  Airway patency: patent  Anesthetic complications: no  Cardiovascular status: acceptable  Respiratory status: acceptable  Hydration status: acceptable  Comments: +Post-Anesthesia Evaluation and Assessment    Patient: Montez Chun MRN: [id number]  SSN: [id number]   YOB: 1945  Age: 66 y.o. Sex: female      Cardiovascular Function/Vital Signs    BP (!) 125/56   Pulse 84   Temp 36.7 °C (98 °F)   Resp 22   Ht 5' 2\" (1.575 m)   Wt 68 kg (150 lb)   SpO2 98%   Breastfeeding No   BMI 27.44 kg/m²     Patient is status post Procedure(s):  ESOPHAGOGASTRODUODENOSCOPY (EGD)  ESOPHAGOGASTRODUODENAL (EGD) BIOPSY. Nausea/Vomiting: Controlled. Postoperative hydration reviewed and adequate. Pain:  Pain Scale 1: Numeric (0 - 10) (09/24/20 1259)  Pain Intensity 1: 0 (09/24/20 1259)   Managed. Neurological Status: At baseline. Mental Status and Level of Consciousness: Arousable. Pulmonary Status:   O2 Device: Room air (09/24/20 1259)   Adequate oxygenation and airway patent. Complications related to anesthesia: None    Post-anesthesia assessment completed. No concerns. Signed By: Bill Frey DO    9/24/2020  Post anesthesia nausea and vomiting:  controlled      INITIAL Post-op Vital signs:   Vitals Value Taken Time   /60 9/24/2020  1:07 PM   Temp     Pulse 79 9/24/2020  1:08 PM   Resp 19 9/24/2020  1:08 PM   SpO2 98 % 9/24/2020  1:08 PM   Vitals shown include unvalidated device data.

## 2020-09-24 NOTE — PROCEDURES
[facility]                   Endoscopic Gastroduodenoscopy Procedure Note      9/24/2020  Jai Shepherd  1945  [id number]    Procedure: Endoscopic Gastroduodenoscopy with biopsy    Indication:  Occult blood in stool with possible UGI origin     Pre-operative Diagnosis: see indication above    Post-operative Diagnosis: see findings below    : SALVATORE Barakat MD    Referring Provider:  Gume Colvin MD      Anesthesia/Sedation:  MAC anesthesia Propofol    Airway assessment: No airway problems anticipated    Pre-Procedural Exam:      Airway: clear, no airway problems anticipated  Heart: RRR, without gallops or rubs  Lungs: clear bilaterally without wheezes, crackles, or rhonchi  Abdomen: soft, nontender, nondistended, bowel sounds present  Mental Status: awake, alert and oriented to person, place and time       Procedure Details     After infomed consent was obtained for the procedure, with all risks and benefits of procedure explained the patient was taken to the endoscopy suite and placed in the left lateral decubitus position. Following sequential administration of sedation as per above, the endoscope was inserted into the mouth and advanced under direct vision to second portion of the duodenum. A careful inspection was made as the gastroscope was withdrawn, including a retroflexed view of the proximal stomach; findings and interventions are described below. Findings:   Esophagus:  Focal esophagitis at the GE junction, biopsied  Stomach: Erosive antritis with friability and small amounts old blood  Duodenum: Pinpoint acute ulcer in bulb. No bleeding    Therapies:  biopsy of esophagus  biopsy of stomach antrum    Specimens: 1. Gastric antral biopsies  2 Biopsies of GE junction           Complications:   None; patient tolerated the procedure well. EBL:  None. Impression:      1. Esophagitis  2. Erosive hemorrhagic gastritis  3.  Acute small duodenal bulbar ulcer    Recommendations:  -Acid suppression with a proton pump inhibitor---start omeprazole 20 mg daily. , -Await pathology. , -No NSAIDS    Isaac Meyer MD9/24/2020

## 2020-09-24 NOTE — H&P
Jenny Mota MD  [facility], [address], [address]  [place], [address] [facility]  [phone]  [email or web address]      See office H and P. No interval change. Date of Surgery Update:  Nito Borden was seen and examined. History and physical has been reviewed. The patient has been examined.  There have been no significant clinical changes since the completion of the originally dated History and Physical.    Signed By: Milan Kate MD     September 24, 2020 12:27 PM

## 2020-09-24 NOTE — ANESTHESIA PREPROCEDURE EVALUATION
Relevant Problems   No relevant active problems       Anesthetic History   No history of anesthetic complications            Review of Systems / Medical History  Patient summary reviewed, nursing notes reviewed and pertinent labs reviewed    Pulmonary  Within defined limits          Asthma        Neuro/Psych         Psychiatric history    Comments: Depression Cardiovascular    Hypertension      CHF  Dysrhythmias : atrial fibrillation  CAD    Exercise tolerance: >4 METS  Comments: Palpitations    TTE (12/24/13):  EF=55-60%  Hx of PE   GI/Hepatic/Renal           Liver disease    Comments: H/O Colon Polyps Endo/Other    Diabetes: well controlled, type 2    Arthritis     Other Findings            Physical Exam    Airway  Mallampati: II  TM Distance: 4 - 6 cm  Neck ROM: normal range of motion   Mouth opening: Normal     Cardiovascular    Rhythm: irregular  Rate: normal         Dental    Dentition: Implants and Caps/crowns  Comments: Lower implants   Pulmonary  Breath sounds clear to auscultation               Abdominal  GI exam deferred       Other Findings            Anesthetic Plan    ASA: 3  Anesthesia type: total IV anesthesia - saphenous block          Induction: Intravenous  Anesthetic plan and risks discussed with: Patient      Beta blker last night on schedule

## 2021-01-24 ENCOUNTER — HOSPITAL ENCOUNTER (OUTPATIENT)
Dept: PREADMISSION TESTING | Age: 76
Discharge: HOME | End: 2021-01-24
Payer: MEDICARE

## 2021-01-24 LAB — SARS-COV-2, COV2: NORMAL

## 2021-01-24 PROCEDURE — U0005 INFEC AGEN DETEC AMPLI PROBE: HCPCS

## 2021-01-25 LAB — SARS-COV-2, COV2NT: NOT DETECTED

## 2021-01-28 ENCOUNTER — HOSPITAL ENCOUNTER (OUTPATIENT)
Age: 76
Discharge: HOME | End: 2021-01-29
Attending: INTERNAL MEDICINE | Admitting: INTERNAL MEDICINE
Payer: MEDICARE

## 2021-01-28 ENCOUNTER — ANESTHESIA (OUTPATIENT)
Dept: CARDIAC CATH/INVASIVE PROCEDURES | Age: 76
End: 2021-01-28
Payer: MEDICARE

## 2021-01-28 ENCOUNTER — ANESTHESIA EVENT (OUTPATIENT)
Dept: CARDIAC CATH/INVASIVE PROCEDURES | Age: 76
End: 2021-01-28
Payer: MEDICARE

## 2021-01-28 ENCOUNTER — APPOINTMENT (OUTPATIENT)
Dept: CARDIAC CATH/INVASIVE PROCEDURES | Age: 76
End: 2021-01-28
Attending: INTERNAL MEDICINE
Payer: MEDICARE

## 2021-01-28 DIAGNOSIS — I48.91 ATRIAL FIBRILLATION, UNSPECIFIED TYPE (HCC): ICD-10-CM

## 2021-01-28 PROBLEM — Z86.79 S/P ABLATION OF ATRIAL FIBRILLATION: Status: ACTIVE | Noted: 2021-01-28

## 2021-01-28 PROBLEM — Z98.890 S/P ABLATION OF ATRIAL FIBRILLATION: Status: ACTIVE | Noted: 2021-01-28

## 2021-01-28 LAB
ACT BLD: 169 SECS (ref 79–138)
ACT BLD: 169 SECS (ref 79–138)
ACT BLD: 257 SECS (ref 79–138)
ACT BLD: 279 SECS (ref 79–138)
ACT BLD: 279 SECS (ref 79–138)
ACT BLD: 312 SECS (ref 79–138)
ACT BLD: 389 SECS (ref 79–138)
GLUCOSE BLD STRIP.AUTO-MCNC: 121 MG/DL (ref 65–100)
GLUCOSE BLD STRIP.AUTO-MCNC: 140 MG/DL (ref 65–100)
GLUCOSE BLD STRIP.AUTO-MCNC: 152 MG/DL (ref 65–100)
INR BLD: 2.5 (ref 0.9–1.2)
SERVICE CMNT-IMP: ABNORMAL

## 2021-01-28 PROCEDURE — 82962 GLUCOSE BLOOD TEST: CPT

## 2021-01-28 PROCEDURE — C2630 CATH, EP, COOL-TIP: HCPCS | Performed by: INTERNAL MEDICINE

## 2021-01-28 PROCEDURE — 85347 COAGULATION TIME ACTIVATED: CPT

## 2021-01-28 PROCEDURE — 77030034850: Performed by: INTERNAL MEDICINE

## 2021-01-28 PROCEDURE — C1730 CATH, EP, 19 OR FEW ELECT: HCPCS | Performed by: INTERNAL MEDICINE

## 2021-01-28 PROCEDURE — 74011636637 HC RX REV CODE- 636/637: Performed by: INTERNAL MEDICINE

## 2021-01-28 PROCEDURE — 93613 INTRACARDIAC EPHYS 3D MAPG: CPT

## 2021-01-28 PROCEDURE — 77030026438 HC STYL ET INTUB CARD -A: Performed by: ANESTHESIOLOGY

## 2021-01-28 PROCEDURE — 74011250637 HC RX REV CODE- 250/637: Performed by: INTERNAL MEDICINE

## 2021-01-28 PROCEDURE — 77030029359 HC PRB ESOPH TEMP CATH ANTM -F: Performed by: INTERNAL MEDICINE

## 2021-01-28 PROCEDURE — 77030013079 HC BLNKT BAIR HGGR 3M -A: Performed by: ANESTHESIOLOGY

## 2021-01-28 PROCEDURE — 76210000006 HC OR PH I REC 0.5 TO 1 HR

## 2021-01-28 PROCEDURE — 77030038099 HC CBL CATH DIAG D-AVSE ABBT -D: Performed by: INTERNAL MEDICINE

## 2021-01-28 PROCEDURE — C1759 CATH, INTRA ECHOCARDIOGRAPHY: HCPCS | Performed by: INTERNAL MEDICINE

## 2021-01-28 PROCEDURE — 93657 TX L/R ATRIAL FIB ADDL: CPT | Performed by: INTERNAL MEDICINE

## 2021-01-28 PROCEDURE — 93655 ICAR CATH ABLTJ DSCRT ARRHYT: CPT | Performed by: INTERNAL MEDICINE

## 2021-01-28 PROCEDURE — 74011250636 HC RX REV CODE- 250/636: Performed by: NURSE ANESTHETIST, CERTIFIED REGISTERED

## 2021-01-28 PROCEDURE — 77030033352 HC TBNG IRR PMP COOL PNT STJU -B: Performed by: INTERNAL MEDICINE

## 2021-01-28 PROCEDURE — 85610 PROTHROMBIN TIME: CPT

## 2021-01-28 PROCEDURE — 74011000258 HC RX REV CODE- 258: Performed by: NURSE ANESTHETIST, CERTIFIED REGISTERED

## 2021-01-28 PROCEDURE — 77030015398 HC CBL EP EXT STJU -C: Performed by: INTERNAL MEDICINE

## 2021-01-28 PROCEDURE — 77030018733 HC ELECTRD KT ENSITE STJU -G: Performed by: INTERNAL MEDICINE

## 2021-01-28 PROCEDURE — 74011000250 HC RX REV CODE- 250: Performed by: NURSE ANESTHETIST, CERTIFIED REGISTERED

## 2021-01-28 PROCEDURE — C1894 INTRO/SHEATH, NON-LASER: HCPCS | Performed by: INTERNAL MEDICINE

## 2021-01-28 PROCEDURE — C1766 INTRO/SHEATH,STRBLE,NON-PEEL: HCPCS | Performed by: INTERNAL MEDICINE

## 2021-01-28 PROCEDURE — 77030008684 HC TU ET CUF COVD -B: Performed by: ANESTHESIOLOGY

## 2021-01-28 PROCEDURE — 77030003700 HC NDL TSEPTL STJU -C: Performed by: INTERNAL MEDICINE

## 2021-01-28 PROCEDURE — 77030010880 HC CBL EP SUPRME STJU -C: Performed by: INTERNAL MEDICINE

## 2021-01-28 PROCEDURE — C1893 INTRO/SHEATH, FIXED,NON-PEEL: HCPCS | Performed by: INTERNAL MEDICINE

## 2021-01-28 PROCEDURE — 77030018729 HC ELECTRD DEFIB PAD CARD -B: Performed by: INTERNAL MEDICINE

## 2021-01-28 PROCEDURE — 93662 INTRACARDIAC ECG (ICE): CPT

## 2021-01-28 PROCEDURE — 77030029065 HC DRSG HEMO QCLOT ZMED -B: Performed by: INTERNAL MEDICINE

## 2021-01-28 PROCEDURE — 93656 COMPRE EP EVAL ABLTJ ATR FIB: CPT | Performed by: INTERNAL MEDICINE

## 2021-01-28 PROCEDURE — 77030013797 HC KT TRNSDUC PRSSR EDWD -A: Performed by: INTERNAL MEDICINE

## 2021-01-28 PROCEDURE — 74011250636 HC RX REV CODE- 250/636: Performed by: INTERNAL MEDICINE

## 2021-01-28 PROCEDURE — 76060000039 HC ANESTHESIA 4 TO 4.5 HR: Performed by: INTERNAL MEDICINE

## 2021-01-28 PROCEDURE — 93621 COMP EP EVL L PAC&REC C SINS: CPT | Performed by: INTERNAL MEDICINE

## 2021-01-28 RX ORDER — FENTANYL CITRATE 50 UG/ML
INJECTION, SOLUTION INTRAMUSCULAR; INTRAVENOUS AS NEEDED
Status: DISCONTINUED | OUTPATIENT
Start: 2021-01-28 | End: 2021-01-28 | Stop reason: HOSPADM

## 2021-01-28 RX ORDER — FENTANYL CITRATE 50 UG/ML
25 INJECTION, SOLUTION INTRAMUSCULAR; INTRAVENOUS
Status: DISCONTINUED | OUTPATIENT
Start: 2021-01-28 | End: 2021-01-29 | Stop reason: HOSPADM

## 2021-01-28 RX ORDER — ONDANSETRON 2 MG/ML
INJECTION INTRAMUSCULAR; INTRAVENOUS AS NEEDED
Status: DISCONTINUED | OUTPATIENT
Start: 2021-01-28 | End: 2021-01-28 | Stop reason: HOSPADM

## 2021-01-28 RX ORDER — PHENYLEPHRINE HCL IN 0.9% NACL 0.4MG/10ML
SYRINGE (ML) INTRAVENOUS AS NEEDED
Status: DISCONTINUED | OUTPATIENT
Start: 2021-01-28 | End: 2021-01-28

## 2021-01-28 RX ORDER — HEPARIN SODIUM 200 [USP'U]/100ML
INJECTION, SOLUTION INTRAVENOUS
Status: COMPLETED | OUTPATIENT
Start: 2021-01-28 | End: 2021-01-28

## 2021-01-28 RX ORDER — SODIUM CHLORIDE, SODIUM LACTATE, POTASSIUM CHLORIDE, CALCIUM CHLORIDE 600; 310; 30; 20 MG/100ML; MG/100ML; MG/100ML; MG/100ML
25 INJECTION, SOLUTION INTRAVENOUS CONTINUOUS
Status: DISCONTINUED | OUTPATIENT
Start: 2021-01-28 | End: 2021-01-29 | Stop reason: HOSPADM

## 2021-01-28 RX ORDER — DEXTROSE 50 % IN WATER (D50W) INTRAVENOUS SYRINGE
12.5-25 AS NEEDED
Status: DISCONTINUED | OUTPATIENT
Start: 2021-01-28 | End: 2021-01-29 | Stop reason: HOSPADM

## 2021-01-28 RX ORDER — MORPHINE SULFATE 10 MG/ML
2 INJECTION, SOLUTION INTRAMUSCULAR; INTRAVENOUS
Status: DISCONTINUED | OUTPATIENT
Start: 2021-01-28 | End: 2021-01-28 | Stop reason: SDUPTHER

## 2021-01-28 RX ORDER — SODIUM CHLORIDE 9 MG/ML
INJECTION, SOLUTION INTRAVENOUS
Status: DISCONTINUED | OUTPATIENT
Start: 2021-01-28 | End: 2021-01-28 | Stop reason: HOSPADM

## 2021-01-28 RX ORDER — INSULIN LISPRO 100 [IU]/ML
INJECTION, SOLUTION INTRAVENOUS; SUBCUTANEOUS
Status: DISCONTINUED | OUTPATIENT
Start: 2021-01-28 | End: 2021-01-29 | Stop reason: HOSPADM

## 2021-01-28 RX ORDER — GEMFIBROZIL 600 MG/1
600 TABLET, FILM COATED ORAL 2 TIMES DAILY
Status: DISCONTINUED | OUTPATIENT
Start: 2021-01-28 | End: 2021-01-29 | Stop reason: HOSPADM

## 2021-01-28 RX ORDER — FUROSEMIDE 40 MG/1
40 TABLET ORAL DAILY
Status: DISCONTINUED | OUTPATIENT
Start: 2021-01-29 | End: 2021-01-29 | Stop reason: HOSPADM

## 2021-01-28 RX ORDER — SODIUM CHLORIDE 0.9 % (FLUSH) 0.9 %
5-40 SYRINGE (ML) INJECTION AS NEEDED
Status: DISCONTINUED | OUTPATIENT
Start: 2021-01-28 | End: 2021-01-29 | Stop reason: HOSPADM

## 2021-01-28 RX ORDER — PROPOFOL 10 MG/ML
INJECTION, EMULSION INTRAVENOUS AS NEEDED
Status: DISCONTINUED | OUTPATIENT
Start: 2021-01-28 | End: 2021-01-28 | Stop reason: HOSPADM

## 2021-01-28 RX ORDER — LIDOCAINE HYDROCHLORIDE 20 MG/ML
INJECTION, SOLUTION EPIDURAL; INFILTRATION; INTRACAUDAL; PERINEURAL AS NEEDED
Status: DISCONTINUED | OUTPATIENT
Start: 2021-01-28 | End: 2021-01-28 | Stop reason: HOSPADM

## 2021-01-28 RX ORDER — LIDOCAINE HYDROCHLORIDE 10 MG/ML
0.1 INJECTION, SOLUTION EPIDURAL; INFILTRATION; INTRACAUDAL; PERINEURAL AS NEEDED
Status: DISCONTINUED | OUTPATIENT
Start: 2021-01-28 | End: 2021-01-29 | Stop reason: HOSPADM

## 2021-01-28 RX ORDER — MAGNESIUM SULFATE 100 %
4 CRYSTALS MISCELLANEOUS AS NEEDED
Status: DISCONTINUED | OUTPATIENT
Start: 2021-01-28 | End: 2021-01-29 | Stop reason: HOSPADM

## 2021-01-28 RX ORDER — MORPHINE SULFATE 2 MG/ML
2 INJECTION, SOLUTION INTRAMUSCULAR; INTRAVENOUS
Status: DISCONTINUED | OUTPATIENT
Start: 2021-01-28 | End: 2021-01-29 | Stop reason: HOSPADM

## 2021-01-28 RX ORDER — DOCUSATE SODIUM 100 MG/1
100 CAPSULE, LIQUID FILLED ORAL 2 TIMES DAILY
Status: DISCONTINUED | OUTPATIENT
Start: 2021-01-28 | End: 2021-01-29 | Stop reason: HOSPADM

## 2021-01-28 RX ORDER — DILTIAZEM HYDROCHLORIDE 180 MG/1
180 CAPSULE, COATED, EXTENDED RELEASE ORAL DAILY
Status: DISCONTINUED | OUTPATIENT
Start: 2021-01-29 | End: 2021-01-29 | Stop reason: HOSPADM

## 2021-01-28 RX ORDER — LOSARTAN POTASSIUM 25 MG/1
25 TABLET ORAL DAILY
Status: DISCONTINUED | OUTPATIENT
Start: 2021-01-29 | End: 2021-01-29 | Stop reason: HOSPADM

## 2021-01-28 RX ORDER — BUPROPION HYDROCHLORIDE 150 MG/1
150 TABLET, EXTENDED RELEASE ORAL 2 TIMES DAILY
Status: DISCONTINUED | OUTPATIENT
Start: 2021-01-28 | End: 2021-01-29 | Stop reason: HOSPADM

## 2021-01-28 RX ORDER — PANTOPRAZOLE SODIUM 40 MG/1
40 TABLET, DELAYED RELEASE ORAL
Status: DISCONTINUED | OUTPATIENT
Start: 2021-01-29 | End: 2021-01-29 | Stop reason: HOSPADM

## 2021-01-28 RX ORDER — HYDROMORPHONE HYDROCHLORIDE 1 MG/ML
.2-.5 INJECTION, SOLUTION INTRAMUSCULAR; INTRAVENOUS; SUBCUTANEOUS
Status: DISCONTINUED | OUTPATIENT
Start: 2021-01-28 | End: 2021-01-29 | Stop reason: HOSPADM

## 2021-01-28 RX ORDER — EPHEDRINE SULFATE/0.9% NACL/PF 50 MG/5 ML
SYRINGE (ML) INTRAVENOUS AS NEEDED
Status: DISCONTINUED | OUTPATIENT
Start: 2021-01-28 | End: 2021-01-28 | Stop reason: HOSPADM

## 2021-01-28 RX ORDER — ACETAMINOPHEN 325 MG/1
650 TABLET ORAL ONCE
Status: COMPLETED | OUTPATIENT
Start: 2021-01-28 | End: 2021-01-28

## 2021-01-28 RX ORDER — HEPARIN SODIUM 1000 [USP'U]/ML
INJECTION, SOLUTION INTRAVENOUS; SUBCUTANEOUS AS NEEDED
Status: DISCONTINUED | OUTPATIENT
Start: 2021-01-28 | End: 2021-01-28 | Stop reason: HOSPADM

## 2021-01-28 RX ORDER — SODIUM CHLORIDE 0.9 % (FLUSH) 0.9 %
5-40 SYRINGE (ML) INJECTION EVERY 8 HOURS
Status: DISCONTINUED | OUTPATIENT
Start: 2021-01-28 | End: 2021-01-29 | Stop reason: HOSPADM

## 2021-01-28 RX ORDER — PROTAMINE SULFATE 10 MG/ML
INJECTION, SOLUTION INTRAVENOUS AS NEEDED
Status: DISCONTINUED | OUTPATIENT
Start: 2021-01-28 | End: 2021-01-28 | Stop reason: HOSPADM

## 2021-01-28 RX ORDER — ONDANSETRON 2 MG/ML
4 INJECTION INTRAMUSCULAR; INTRAVENOUS AS NEEDED
Status: DISCONTINUED | OUTPATIENT
Start: 2021-01-28 | End: 2021-01-29 | Stop reason: HOSPADM

## 2021-01-28 RX ORDER — DIPHENHYDRAMINE HYDROCHLORIDE 50 MG/ML
12.5 INJECTION, SOLUTION INTRAMUSCULAR; INTRAVENOUS AS NEEDED
Status: ACTIVE | OUTPATIENT
Start: 2021-01-28 | End: 2021-01-28

## 2021-01-28 RX ORDER — METOPROLOL SUCCINATE 25 MG/1
12.5 TABLET, EXTENDED RELEASE ORAL
Status: DISCONTINUED | OUTPATIENT
Start: 2021-01-28 | End: 2021-01-29 | Stop reason: HOSPADM

## 2021-01-28 RX ORDER — MONTELUKAST SODIUM 10 MG/1
10 TABLET ORAL
Status: DISCONTINUED | OUTPATIENT
Start: 2021-01-28 | End: 2021-01-29 | Stop reason: HOSPADM

## 2021-01-28 RX ORDER — SUCCINYLCHOLINE CHLORIDE 20 MG/ML
INJECTION INTRAMUSCULAR; INTRAVENOUS AS NEEDED
Status: DISCONTINUED | OUTPATIENT
Start: 2021-01-28 | End: 2021-01-28 | Stop reason: HOSPADM

## 2021-01-28 RX ORDER — DEXAMETHASONE SODIUM PHOSPHATE 4 MG/ML
INJECTION, SOLUTION INTRA-ARTICULAR; INTRALESIONAL; INTRAMUSCULAR; INTRAVENOUS; SOFT TISSUE AS NEEDED
Status: DISCONTINUED | OUTPATIENT
Start: 2021-01-28 | End: 2021-01-28 | Stop reason: HOSPADM

## 2021-01-28 RX ADMIN — ACETAMINOPHEN 650 MG: 325 TABLET ORAL at 15:35

## 2021-01-28 RX ADMIN — SUCCINYLCHOLINE CHLORIDE 100 MG: 20 INJECTION, SOLUTION INTRAMUSCULAR; INTRAVENOUS at 09:37

## 2021-01-28 RX ADMIN — PROPOFOL 20 MG: 10 INJECTION, EMULSION INTRAVENOUS at 13:00

## 2021-01-28 RX ADMIN — Medication 10 ML: at 22:24

## 2021-01-28 RX ADMIN — LIDOCAINE HYDROCHLORIDE 60 MG: 20 INJECTION, SOLUTION INTRAVENOUS at 09:35

## 2021-01-28 RX ADMIN — HEPARIN SODIUM 12000 UNITS: 1000 INJECTION, SOLUTION INTRAVENOUS; SUBCUTANEOUS at 10:16

## 2021-01-28 RX ADMIN — Medication 5 MG: at 10:20

## 2021-01-28 RX ADMIN — PHENYLEPHRINE HYDROCHLORIDE 10 MCG/MIN: 10 INJECTION INTRAVENOUS at 09:39

## 2021-01-28 RX ADMIN — Medication 5 MG: at 13:16

## 2021-01-28 RX ADMIN — DOCUSATE SODIUM 100 MG: 100 CAPSULE, LIQUID FILLED ORAL at 18:02

## 2021-01-28 RX ADMIN — PROTAMINE SULFATE 10 MG: 10 INJECTION, SOLUTION INTRAVENOUS at 12:55

## 2021-01-28 RX ADMIN — PROTAMINE SULFATE 10 MG: 10 INJECTION, SOLUTION INTRAVENOUS at 12:53

## 2021-01-28 RX ADMIN — DEXAMETHASONE SODIUM PHOSPHATE 4 MG: 4 INJECTION, SOLUTION INTRAMUSCULAR; INTRAVENOUS at 09:52

## 2021-01-28 RX ADMIN — FENTANYL CITRATE 50 MCG: 50 INJECTION, SOLUTION INTRAMUSCULAR; INTRAVENOUS at 09:35

## 2021-01-28 RX ADMIN — PROTAMINE SULFATE 10 MG: 10 INJECTION, SOLUTION INTRAVENOUS at 12:57

## 2021-01-28 RX ADMIN — GEMFIBROZIL 600 MG: 600 TABLET ORAL at 18:02

## 2021-01-28 RX ADMIN — PROTAMINE SULFATE 20 MG: 10 INJECTION, SOLUTION INTRAVENOUS at 13:06

## 2021-01-28 RX ADMIN — PROPOFOL 20 MG: 10 INJECTION, EMULSION INTRAVENOUS at 13:16

## 2021-01-28 RX ADMIN — SODIUM CHLORIDE: 9 INJECTION, SOLUTION INTRAVENOUS at 09:26

## 2021-01-28 RX ADMIN — PROPOFOL 10 MG: 10 INJECTION, EMULSION INTRAVENOUS at 13:13

## 2021-01-28 RX ADMIN — Medication 10 ML: at 18:03

## 2021-01-28 RX ADMIN — Medication 5 MG: at 09:53

## 2021-01-28 RX ADMIN — PROTAMINE SULFATE 10 MG: 10 INJECTION, SOLUTION INTRAVENOUS at 12:52

## 2021-01-28 RX ADMIN — PROPOFOL 20 MG: 10 INJECTION, EMULSION INTRAVENOUS at 13:10

## 2021-01-28 RX ADMIN — PROPOFOL 20 MG: 10 INJECTION, EMULSION INTRAVENOUS at 12:58

## 2021-01-28 RX ADMIN — Medication 5 MG: at 13:13

## 2021-01-28 RX ADMIN — INSULIN LISPRO 2 UNITS: 100 INJECTION, SOLUTION INTRAVENOUS; SUBCUTANEOUS at 18:02

## 2021-01-28 RX ADMIN — PROTAMINE SULFATE 10 MG: 10 INJECTION, SOLUTION INTRAVENOUS at 12:58

## 2021-01-28 RX ADMIN — Medication 5 MG: at 13:09

## 2021-01-28 RX ADMIN — ONDANSETRON HYDROCHLORIDE 4 MG: 2 INJECTION, SOLUTION INTRAMUSCULAR; INTRAVENOUS at 12:55

## 2021-01-28 RX ADMIN — PROPOFOL 120 MG: 10 INJECTION, EMULSION INTRAVENOUS at 09:37

## 2021-01-28 RX ADMIN — Medication 5 MG: at 10:12

## 2021-01-28 RX ADMIN — PROTAMINE SULFATE 10 MG: 10 INJECTION, SOLUTION INTRAVENOUS at 12:56

## 2021-01-28 RX ADMIN — HEPARIN SODIUM 2000 UNITS: 1000 INJECTION, SOLUTION INTRAVENOUS; SUBCUTANEOUS at 12:41

## 2021-01-28 RX ADMIN — PROTAMINE SULFATE 10 MG: 10 INJECTION, SOLUTION INTRAVENOUS at 12:54

## 2021-01-28 RX ADMIN — PROPOFOL 20 MG: 10 INJECTION, EMULSION INTRAVENOUS at 13:07

## 2021-01-28 RX ADMIN — PROPOFOL 20 MG: 10 INJECTION, EMULSION INTRAVENOUS at 13:04

## 2021-01-28 NOTE — PROGRESS NOTES
Cardiac Cath Lab Recovery Arrival Note:      Yesenia Flanagan arrived to Cardiac Cath Lab, Recovery Area. Staff introduced to patient. Patient identifiers verified with NAME and DATE OF BIRTH. Procedure verified with patient. Consent forms reviewed and signed by patient or authorized representative and verified. Allergies verified. Patient and family oriented to department. Patient and family informed of procedure and plan of care. Questions answered with review. Patient prepped for procedure, per orders from physician, prior to arrival.    Patient on cardiac monitor, non-invasive blood pressure, SPO2 monitor. On room air. Patient is A&Ox 3. Patient reports muscle spasms in back. Patient in stretcher, in low position, with side rails up, call bell within reach, patient instructed to call if assistance as needed. Patient prep in: [address], [place] [address]. Patient family has pager # none  Family in: 1945 [facility] waiting area.    Prep by: Nivia Mera, RN and Frances Durand RN

## 2021-01-28 NOTE — ANESTHESIA POSTPROCEDURE EVALUATION
Procedure(s):  ABLATION A-FIB  W COMPLETE EP STUDY  Ablation Following A-Fib  Addl  Ablation Svt/Vt Add On  Ep 3d Mapping  Lt Atrial Pace & Record During Ep Study  Intracardiac Echocardiogram.    general    Anesthesia Post Evaluation        Patient location during evaluation: PACU  Note status: Adequate. Level of consciousness: responsive to verbal stimuli and sleepy but conscious  Pain management: satisfactory to patient  Airway patency: patent  Anesthetic complications: no  Cardiovascular status: acceptable  Respiratory status: acceptable  Hydration status: acceptable  Comments: +Post-Anesthesia Evaluation and Assessment    Patient: Brandon Meadows MRN: [id number]  SSN: [id number]   YOB: 1945  Age: 66 y.o. Sex: female      Cardiovascular Function/Vital Signs    BP (!) 84/38   Pulse 73   Temp 36.9 °C (98.5 °F)   Resp 20   Ht 5' 2\" (1.575 m)   Wt 65.8 kg (145 lb)   SpO2 98%   BMI 26.52 kg/m²     Patient is status post Procedure(s) with comments:  ABLATION A-FIB  W COMPLETE EP STUDY  Ablation Following A-Fib  Addl  Ablation Svt/Vt Add On - Left Atrial  Ep 3d Mapping  Lt Atrial Pace & Record During Ep Study  Intracardiac Echocardiogram.    Nausea/Vomiting: Controlled. Postoperative hydration reviewed and adequate. Pain:  Pain Scale 1: Numeric (0 - 10) (01/28/21 0947)  Pain Intensity 1: 0 (01/28/21 0947)   Managed. Neurological Status: At baseline. Mental Status and Level of Consciousness: Arousable. Pulmonary Status:   O2 Device: Nasal cannula (01/28/21 1335)   Adequate oxygenation and airway patent. Complications related to anesthesia: None    Post-anesthesia assessment completed. No concerns.     Signed By: Yesenia Miguel MD    1/28/2021  Post anesthesia nausea and vomiting:  controlled      INITIAL Post-op Vital signs:   Vitals Value Taken Time   /46 01/28/21 1400   Temp 36.9 °C (98.5 °F) 01/28/21 1335   Pulse 72 01/28/21 1406   Resp 18 01/28/21 1406   SpO2 94 % 01/28/21 1406   Vitals shown include unvalidated device data.

## 2021-01-28 NOTE — ANESTHESIA PREPROCEDURE EVALUATION
Relevant Problems   No relevant active problems       Anesthetic History   No history of anesthetic complications            Review of Systems / Medical History  Patient summary reviewed, nursing notes reviewed and pertinent labs reviewed    Pulmonary            Asthma        Neuro/Psych         Psychiatric history    Comments: Depression Cardiovascular    Hypertension      CHF  Dysrhythmias : atrial fibrillation  CAD    Exercise tolerance: <4 METS  Comments: Palpitations    TTE (12/24/13):  EF=55-60%  Hx of PE   GI/Hepatic/Renal           Liver disease    Comments: H/O Colon Polyps Endo/Other    Diabetes: well controlled, type 2    Arthritis     Other Findings              Physical Exam    Airway  Mallampati: II  TM Distance: 4 - 6 cm  Neck ROM: normal range of motion   Mouth opening: Normal     Cardiovascular  Regular rate and rhythm,  S1 and S2 normal,  no murmur, click, rub, or gallop             Dental    Dentition: Implants, Caps/crowns and Full lower dentures  Comments: Lower implants   Pulmonary  Breath sounds clear to auscultation               Abdominal  GI exam deferred       Other Findings            Anesthetic Plan    ASA: 3  Anesthesia type: general - saphenous block          Induction: Intravenous  Anesthetic plan and risks discussed with: Patient

## 2021-01-28 NOTE — PERIOP NOTES
Handoff Report from Operating Room to PACU    Report received from [address] [place] [address] CRNA regarding Virgie Class. Surgeon(s):  Anesthesia, Case  And Procedure(s) (LRB):  SPECIAL PROCEDURE OUTSIDE OF OR recovery from EP LAB (N/A)  confirmed   with allergies and dressings discussed. Anesthesia type, drugs, patient history, complications, estimated blood loss, vital signs, intake and output, and last pain medication were reviewed.

## 2021-01-28 NOTE — Clinical Note
Transseptal Cath Performed check box under hemodynamic and ICE and Fluoro, BRK-1 via a guiding sheath.

## 2021-01-28 NOTE — H&P
EP/ ARRHYTHMIA Preprocedure Note    Patient ID:  Patient: Rhoda Mccollum  MRN: [id number]  Age: 66 y.o.  : 1945    Date of  Admission: 2021  6:53 AM   PCP:  Jeramy Barney MD   Usual cardiologist:  Serena Manzano MD    Assessment: 1. Persistent symptomatic Afib. 2. Chronic warfarin anticoagulation, last took warfarin on . Therapeutic INR today. 3. CAD with calcium score in the 380's, no severe obstructive disease on EP mapping CTA. 4. Hypertensive heart disease without heart failure. 5. Diabetes mellitus type 2.  6. Asthma, not in acute exacerbation. 7. Spinal stenosis, degenerative joint disease. 8. Full code. Plan:     1. Given the potential benefits, risks, and alternatives, she agrees to proceed with Afib ablation. No PAUL needed since her INR is 2.5 today, was 4.5 on . Rhoda Mccollum is a 66 y.o. female with a history of persistent symptomatic Afib here for her procedure. She has had some mild REGALADO and fatigue that she attributes to her Afib. I last saw her in the office in lat 2020.       Past Medical History:   Diagnosis Date    Arrhythmia     AFIB    Arthritis     Asthma     CAD (coronary artery disease)     Diabetes ([address]     Heart failure (HCC)     Hepatitis     AGE 8     High cholesterol     Hypertension     Other ill-defined conditions(799.89)     heart palpitations    Psychiatric disorder     depression    Spinal stenosis     Thromboembolus (HCC)     PULMONARY EMBOLIS IN BOTH LUNGS    Vitreous detachment of left eye     BLIND IN LEFT EYE        Past Surgical History:   Procedure Laterality Date    COLONOSCOPY N/A 4/3/2019    [facility] HI RISK IND  4/3/2019         HX CATARACT REMOVAL Bilateral     HX HYSTERECTOMY      HX KNEE REPLACEMENT Right     unicodylar knee replacement    HX OTHER SURGICAL      foot left    HX TONSILLECTOMY      UPPER GI ENDOSCOPY,BIOPSY  2020            Social History     Tobacco Use    Smoking status: Never Smoker    Smokeless tobacco: Never Used   Substance Use Topics    Alcohol use: No        Family History   Problem Relation Age of Onset    Cancer Father     Stroke Father     Heart Disease Paternal Grandfather     Heart Failure Paternal Grandfather     Stroke Paternal Grandfather         Allergies   Allergen Reactions    Baclofen Other (comments)     Hallucinations    Albuterol Palpitations    Bactrim [Sulfamethoxazole-Trimethoprim] Not Reported This Time    Diuretic Softgels [Pamabrom] Other (comments)     Lowers potassium, very sensitive      Iodine Unknown (comments)    Lipitor [Atorvastatin] Myalgia    Shellfish Containing Products Unknown (comments)     \"got choked, It might have been the spices\"    Tizanidine Unknown (comments)          Current Facility-Administered Medications   Medication Dose Route Frequency    heparinized saline 2 units/mL infusion    CONTINUOUS     Facility-Administered Medications Ordered in Other Encounters   Medication Dose Route Frequency    fentaNYL citrate (PF) injection   IntraVENous PRN    lidocaine (PF) (XYLOCAINE) 20 mg/mL (2 %) injection   IntraVENous PRN    PHENYLephrine (HIEU-SYNEPHRINE) 10 mg in 0.9% sodium chloride 250 mL infusion   IntraVENous CONTINUOUS    dexamethasone (DECADRON) 4 mg/mL injection   IntraVENous PRN    ePHEDrine in NS (PF) (MISTOLE) 10 mg/mL in NS syringe   IntraVENous PRN    succinylcholine (ANECTINE) injection   IntraVENous PRN    propofoL (DIPRIVAN) 10 mg/mL injection   IntraVENous PRN    0.9% sodium chloride infusion   IntraVENous CONTINUOUS    heparin (porcine) 1,000 unit/mL injection    PRN    protamine injection    PRN    ondansetron (ZOFRAN) injection   IntraVENous PRN       Review of Symptoms:  Noncontributory except as in office note.        Objective:      Physical Exam:  Temp (24hrs), Av.1 °F (36.7 °C), Min:98.1 °F (36.7 °C), Max:98.1 °F (36.7 °C)    Patient Vitals for the past 8 hrs:   Pulse   01/28/21 0736 78    Patient Vitals for the past 8 hrs:   Resp   01/28/21 0736 15    Patient Vitals for the past 8 hrs:   BP   01/28/21 0736 134/61          Intake/Output Summary (Last 24 hours) at 1/28/2021 1313  Last data filed at 1/28/2021 1256  Gross per 24 hour   Intake 700 ml   Output 375 ml   Net 325 ml       Nondiaphoretic, not in acute distress. Neuro grossly nonfocal.  No tremor. Awake and appropriate. CARDIOGRAPHICS and STUDIES, I reviewed:    Telemetry:  Afib. Labs:  No results for input(s): CPK, CKMB, CKNDX, TROIQ in the last 72 hours.     No lab exists for component: CPKMB  Lab Results   Component Value Date/Time    Cholesterol, total 239 (H) 04/14/2010 03:50 AM    HDL Cholesterol 33 04/14/2010 03:50 AM    LDL, calculated 170.8 (H) 04/14/2010 03:50 AM    Triglyceride 176 04/14/2010 03:50 AM    CHOL/HDL Ratio 7.2 (H) 04/14/2010 03:50 AM     Recent Labs     01/28/21  0802   INR 2.5*        Monique Rivera MD  1/28/2021

## 2021-01-28 NOTE — Clinical Note
TRANSFER - OUT REPORT:     Verbal report given to: BRITNI Yeh. Report consisted of patient's Situation, Background, Assessment and   Recommendations(SBAR). Opportunity for questions and clarification was provided. Patient transported with a Registered Nurse, Monitor and Oxygen. Oxygen used for patient = nasal cannula, @ 2 - 6 Liters.     Patient transported to: PACU.   transported with CRNA and EP Lab staff

## 2021-01-28 NOTE — PROGRESS NOTES
End of Shift Note    Bedside shift change report given to Kalin Vera (oncoming nurse) by Mark Arriaga RN (offgoing nurse). Report included the following information SBAR, Kardex, ED Summary, Procedure Summary, Intake/Output, MAR and Recent Results    Shift worked:  7a-7p     Shift summary and any significant changes:     Uneventful shift. Pt reverted into AFib. Pt ambulated in room without difficulty after end of bedrest.    Concerns for physician to address:       Zone phone for oncoming shift:          Activity:  Activity Level: Bed Rest  Number times ambulated in hallways past shift: 0  Number of times OOB to chair past shift: 1    Cardiac:   Cardiac Monitoring: Yes      Cardiac Rhythm: Normal sinus rhythm    Access:   Current line(s): PIV     Genitourinary:   Urinary status: voiding    Respiratory:   O2 Device: Room air  Chronic home O2 use?: NO  Incentive spirometer at bedside: NO     GI:     Current diet:  DIET DIABETIC CONSISTENT CARB Regular  DIET ONE TIME MESSAGE  Passing flatus: YES  Tolerating current diet: YES       Pain Management:   Patient states pain is manageable on current regimen: YES    Skin:  Brady Score: 20  Interventions: float heels    Patient Safety:  Fall Score:  Total Score: 2  Interventions: gripper socks       Length of Stay:  Expected LOS: - - -  Actual LOS: [address], RN

## 2021-01-28 NOTE — PROGRESS NOTES
S/p Afib ablation with wide encircling lesions surrounding the left and right PV's. Two atrial flutters were seen spontaneously occurring after cardioversion at the beginning of the case, one of which was typical RA flutter (245 msec, concentric LA activation), the other was left atrial (376-408 msec, eccentric LA activation). A LA roof line was done in sinus for persistent Afib substrate. An anterior mitral isthmus line was done in sinus to address the LA flutter with an apparent isthmus in a region of anterior scar. Typical flutter recurred and a CTI line terminated this. Brief 1:1 SVT (junctional tachycardia? 487 msec) was observed, unclear clinical significance. FULL NOTE TO FOLLOW.     Procedures Performed:  [id number]  [id number]  [id number]  [id number]

## 2021-01-28 NOTE — PERIOP NOTES
TRANSFER - OUT REPORT:    Verbal report given to Jan Palomares on [employer]  being transferred to 2159(unit) for routine post - op       Report consisted of patients Situation, Background, Assessment and   Recommendations(SBAR). Information from the following report(s) SBAR, OR Summary, Procedure Summary, Intake/Output, MAR, Recent Results and Cardiac Rhythm nsr was reviewed with the receiving nurse. Opportunity for questions and clarification was provided.       Patient transported with:   Registered Nurse   Monitor

## 2021-01-29 VITALS
TEMPERATURE: 98.1 F | HEIGHT: 62 IN | WEIGHT: 145 LBS | DIASTOLIC BLOOD PRESSURE: 51 MMHG | OXYGEN SATURATION: 98 % | RESPIRATION RATE: 20 BRPM | HEART RATE: 101 BPM | BODY MASS INDEX: 26.68 KG/M2 | SYSTOLIC BLOOD PRESSURE: 129 MMHG

## 2021-01-29 LAB
ANION GAP SERPL CALC-SCNC: 7 MMOL/L (ref 5–15)
BUN SERPL-MCNC: 18 MG/DL (ref 6–20)
BUN/CREAT SERPL: 20 (ref 12–20)
CALCIUM SERPL-MCNC: 8.8 MG/DL (ref 8.5–10.1)
CHLORIDE SERPL-SCNC: 108 MMOL/L (ref 97–108)
CO2 SERPL-SCNC: 23 MMOL/L (ref 21–32)
CREAT SERPL-MCNC: 0.91 MG/DL (ref 0.55–1.02)
GLUCOSE BLD STRIP.AUTO-MCNC: 247 MG/DL (ref 65–100)
GLUCOSE SERPL-MCNC: 127 MG/DL (ref 65–100)
MAGNESIUM SERPL-MCNC: 2.1 MG/DL (ref 1.6–2.4)
POTASSIUM SERPL-SCNC: 4.3 MMOL/L (ref 3.5–5.1)
SERVICE CMNT-IMP: ABNORMAL
SODIUM SERPL-SCNC: 138 MMOL/L (ref 136–145)

## 2021-01-29 PROCEDURE — 74011250637 HC RX REV CODE- 250/637: Performed by: INTERNAL MEDICINE

## 2021-01-29 PROCEDURE — 82962 GLUCOSE BLOOD TEST: CPT

## 2021-01-29 PROCEDURE — 80048 BASIC METABOLIC PNL TOTAL CA: CPT

## 2021-01-29 PROCEDURE — 83735 ASSAY OF MAGNESIUM: CPT

## 2021-01-29 PROCEDURE — 74011636637 HC RX REV CODE- 636/637: Performed by: INTERNAL MEDICINE

## 2021-01-29 PROCEDURE — 36415 COLL VENOUS BLD VENIPUNCTURE: CPT

## 2021-01-29 RX ADMIN — DOCUSATE SODIUM 100 MG: 100 CAPSULE, LIQUID FILLED ORAL at 08:53

## 2021-01-29 RX ADMIN — INSULIN LISPRO 3 UNITS: 100 INJECTION, SOLUTION INTRAVENOUS; SUBCUTANEOUS at 08:57

## 2021-01-29 RX ADMIN — GEMFIBROZIL 600 MG: 600 TABLET ORAL at 08:52

## 2021-01-29 RX ADMIN — DILTIAZEM HYDROCHLORIDE 180 MG: 180 CAPSULE, COATED, EXTENDED RELEASE ORAL at 08:50

## 2021-01-29 RX ADMIN — Medication 10 ML: at 05:29

## 2021-01-29 RX ADMIN — LOSARTAN POTASSIUM 25 MG: 25 TABLET, FILM COATED ORAL at 08:53

## 2021-01-29 RX ADMIN — FUROSEMIDE 40 MG: 40 TABLET ORAL at 08:52

## 2021-01-29 NOTE — DISCHARGE INSTRUCTIONS
POST-ABLATION DISCHARGE INSTRUCTIONS:    You had an atrial fibrillation ablation with Dr. Gaby Quezada yesterday using radiofrequency energy. Please call the office [phone] to make an appointment with the nurse practitioner, Cori Dial, in 2 weeks. Following that appointment, you will need to be seen by Dr. Ilya Zavala in another 2 weeks as we discussed. IF you are in atrial fibrillation, he'll contact me and we'll discuss adding an antiarrhythmic drug and cardioverting. CONTINUE YOUR USUAL MEDICATIONS INCLUDING WARFARIN. Do not drive, operate any machinery, or sign any legal documents for 24 hours after your procedure. You must have someone to drive you home. You may take a shower 24 hours after your cardiac procedure. Be sure to get the dressing wet and then remove it; gently wash the area with warm soapy water. Pat dry and leave open to air. To help prevent infections, be sure to keep the cath site clean and dry. No lotions, creams, powders, ointments, etc. in the cath site for approximately 1 week.  Do not take a tub bath, get in a hot tub or swimming pool for approximately 5 days or until the cath site is completely healed.  No strenuous activity or heavy lifting over 20 lbs. for 7 days.  After your procedure, some bruising or discomfort is common during the healing process. Tylenol, 1-2 tablets every 6 hours as needed, is recommended if you experience any discomfort. If you experience any signs or symptoms of infection such as fever, chills, or poorly healing incision, persistent tenderness or swelling in the groin, redness and/or warmth to the touch, numbness, significant tingling or pain at the groin site or affected extremity, rash, drainage from the site, or if the leg feels tight or swollen, call your physician right away.      If bleeding at the site occurs, take a clean gauze pad and apply direct pressure to the groin just above the puncture site, and call your physician right away.  If your procedure involved ablation therapy, you may feel some mild or vague chest discomfort due to delivery of heat therapy to the heart muscle. This should resolve in 1-2 days. If it gets worse or is associated with shortness of breath, dizziness, loss of consciousness, call your physician right away or call 911 if emergency medical care is needed.      Signed By: Janine Quintero MD     January 29, 2021

## 2021-01-29 NOTE — ROUTINE PROCESS
End of Shift Note Bedside shift change report given to Maria Guadalupe Delvalle RN (oncoming nurse) by Tisha Dunlap RN (offgoing nurse). Report included the following information SBAR, Kardex, Procedure Summary, Recent Results and Cardiac Rhythm Afib Shift worked:  [id number] Shift summary and any significant changes:  
  Pt had an uneventful night. Pt refused to take Metoprolol, have her finger stuck for her HS blood sugar, hospital supplied singular or have her de dios removed til this AM.  Pt told me when I had been out of the room, she had taken her home medication of Bystolic and Singular. De Dios catheter was removed about 0530. Pt has voided since. Pt has walked in hallway. Tolerated well. Concerns for physician to address:  none Zone phone for oncoming shift:   none Activity: 
Activity Level: Up with Assistance Number times ambulated in hallways past shift: 1 Number of times OOB to chair past shift: 3 Cardiac:  
Cardiac Monitoring: Yes     
Cardiac Rhythm: Atrial fibrillation Access:  
Current line(s): PIV Genitourinary:  
Urinary status: voiding and de dios Respiratory:  
O2 Device: Room air Chronic home O2 use?: NO Incentive spirometer at bedside: N/A 
  
GI: 
  
Current diet:  DIET DIABETIC CONSISTENT CARB Regular DIET ONE TIME MESSAGE Passing flatus: YES Tolerating current diet: YES Pain Management:  
Patient states pain is manageable on current regimen: YES Skin: 
Brady Score: 21 Interventions: nutritional support Patient Safety: 
Fall Score: Total Score: 1 Interventions: gripper socks Length of Stay: 
Expected LOS: - - - Actual LOS: 0 Tisha Dunlap RN

## 2021-01-29 NOTE — PROGRESS NOTES
I think she's returned to atrial fibrillation. Reasonable plan is to let her heal (good lesion set laid down today), then cardiovert her as an OP in one month. The bigger question is whether she should be tried on an antiarrhythmic to help maintain sinus rhythm, I think that may be the shortterm best solution. I'll talk to her tomorrow about amiodarone (starting on discharge) versus sotalol (requires IP monitoring) versus dofetilide (requires IP monitoring) as options. Depending on what she decides, we'll go forward from there. Addendum:  I'll check a BMP and Mag in the AM.  If reasonable, I'll discuss whether an ibutilide infusion is reasonable to cardiovert. She'd need to be admitted for at least observation for that.     Signed By: Cesilia Reynolds MD     January 28, 2021

## 2021-01-29 NOTE — PROGRESS NOTES
We've decided to NOT start amiodarone, to get her INR back on track first (managed by another physician). F/U in 2 weeks with my NP to get some early data, but 2 weeks later with Dr. Maria T Dias her cardiologist.  IF she's in Afib, then will arrange for antiarrhythmic therapy and cardioversion after discussing with Dr. Maria T Dias. All questions answered for her.

## 2021-01-29 NOTE — PROGRESS NOTES
Problem: Falls - Risk of  Goal: *Absence of Falls  Description: Document Starleen Freeze Fall Risk and appropriate interventions in the flowsheet.   Outcome: Resolved/Met     Problem: Patient Education: Go to Patient Education Activity  Goal: Patient/Family Education  Outcome: Resolved/Met

## 2021-02-22 ENCOUNTER — HOSPITAL ENCOUNTER (EMERGENCY)
Age: 76
Discharge: HOME | End: 2021-02-22
Attending: EMERGENCY MEDICINE
Payer: MEDICARE

## 2021-02-22 ENCOUNTER — APPOINTMENT (OUTPATIENT)
Dept: GENERAL RADIOLOGY | Age: 76
End: 2021-02-22
Attending: EMERGENCY MEDICINE
Payer: MEDICARE

## 2021-02-22 VITALS
SYSTOLIC BLOOD PRESSURE: 100 MMHG | WEIGHT: 145.06 LBS | DIASTOLIC BLOOD PRESSURE: 56 MMHG | BODY MASS INDEX: 26.69 KG/M2 | HEIGHT: 62 IN | HEART RATE: 83 BPM | OXYGEN SATURATION: 97 % | RESPIRATION RATE: 16 BRPM | TEMPERATURE: 97.8 F

## 2021-02-22 DIAGNOSIS — I48.91 ATRIAL FIBRILLATION WITH RAPID VENTRICULAR RESPONSE (HCC): Primary | ICD-10-CM

## 2021-02-22 DIAGNOSIS — M54.50 ACUTE BILATERAL LOW BACK PAIN WITHOUT SCIATICA: ICD-10-CM

## 2021-02-22 LAB
ALBUMIN SERPL-MCNC: 4.2 G/DL (ref 3.5–5)
ALBUMIN/GLOB SERPL: 1.2 {RATIO} (ref 1.1–2.2)
ALP SERPL-CCNC: 106 U/L (ref 45–117)
ALT SERPL-CCNC: 22 U/L (ref 12–78)
ANION GAP SERPL CALC-SCNC: 7 MMOL/L (ref 5–15)
APPEARANCE UR: ABNORMAL
AST SERPL-CCNC: 28 U/L (ref 15–37)
ATRIAL RATE: 153 BPM
BACTERIA URNS QL MICRO: NEGATIVE /HPF
BASOPHILS # BLD: 0.1 K/UL (ref 0–0.1)
BASOPHILS NFR BLD: 1 % (ref 0–1)
BILIRUB SERPL-MCNC: 0.6 MG/DL (ref 0.2–1)
BILIRUB UR QL: NEGATIVE
BNP SERPL-MCNC: 733 PG/ML
BUN SERPL-MCNC: 23 MG/DL (ref 6–20)
BUN/CREAT SERPL: 21 (ref 12–20)
CALCIUM SERPL-MCNC: 9.6 MG/DL (ref 8.5–10.1)
CALCULATED R AXIS, ECG10: 154 DEGREES
CALCULATED T AXIS, ECG11: 73 DEGREES
CHLORIDE SERPL-SCNC: 104 MMOL/L (ref 97–108)
CO2 SERPL-SCNC: 25 MMOL/L (ref 21–32)
COLOR UR: ABNORMAL
CREAT SERPL-MCNC: 1.12 MG/DL (ref 0.55–1.02)
DIAGNOSIS, 93000: NORMAL
DIFFERENTIAL METHOD BLD: ABNORMAL
EOSINOPHIL # BLD: 0.4 K/UL (ref 0–0.4)
EOSINOPHIL NFR BLD: 6 % (ref 0–7)
EPITH CASTS URNS QL MICRO: ABNORMAL /LPF
ERYTHROCYTE [DISTWIDTH] IN BLOOD BY AUTOMATED COUNT: 14.6 % (ref 11.5–14.5)
GLOBULIN SER CALC-MCNC: 3.5 G/DL (ref 2–4)
GLUCOSE SERPL-MCNC: 106 MG/DL (ref 65–100)
GLUCOSE UR STRIP.AUTO-MCNC: NEGATIVE MG/DL
HCT VFR BLD AUTO: 37.4 % (ref 35–47)
HGB BLD-MCNC: 11.6 G/DL (ref 11.5–16)
HGB UR QL STRIP: NEGATIVE
IMM GRANULOCYTES # BLD AUTO: 0 K/UL (ref 0–0.04)
IMM GRANULOCYTES NFR BLD AUTO: 0 % (ref 0–0.5)
KETONES UR QL STRIP.AUTO: NEGATIVE MG/DL
LACTATE SERPL-SCNC: 1.2 MMOL/L (ref 0.4–2)
LEUKOCYTE ESTERASE UR QL STRIP.AUTO: NEGATIVE
LYMPHOCYTES # BLD: 1 K/UL (ref 0.8–3.5)
LYMPHOCYTES NFR BLD: 17 % (ref 12–49)
MAGNESIUM SERPL-MCNC: 2.1 MG/DL (ref 1.6–2.4)
MCH RBC QN AUTO: 27.7 PG (ref 26–34)
MCHC RBC AUTO-ENTMCNC: 31 G/DL (ref 30–36.5)
MCV RBC AUTO: 89.3 FL (ref 80–99)
MONOCYTES # BLD: 0.7 K/UL (ref 0–1)
MONOCYTES NFR BLD: 12 % (ref 5–13)
NEUTS SEG # BLD: 3.9 K/UL (ref 1.8–8)
NEUTS SEG NFR BLD: 64 % (ref 32–75)
NITRITE UR QL STRIP.AUTO: NEGATIVE
NRBC # BLD: 0 K/UL (ref 0–0.01)
NRBC BLD-RTO: 0 PER 100 WBC
PH UR STRIP: 6 [PH] (ref 5–8)
PLATELET # BLD AUTO: 186 K/UL (ref 150–400)
PMV BLD AUTO: 10.8 FL (ref 8.9–12.9)
POTASSIUM SERPL-SCNC: 3.7 MMOL/L (ref 3.5–5.1)
PROT SERPL-MCNC: 7.7 G/DL (ref 6.4–8.2)
PROT UR STRIP-MCNC: NEGATIVE MG/DL
Q-T INTERVAL, ECG07: 298 MS
QRS DURATION, ECG06: 80 MS
QTC CALCULATION (BEZET), ECG08: 473 MS
RBC # BLD AUTO: 4.19 M/UL (ref 3.8–5.2)
RBC #/AREA URNS HPF: ABNORMAL /HPF (ref 0–5)
SODIUM SERPL-SCNC: 136 MMOL/L (ref 136–145)
SP GR UR REFRACTOMETRY: 1.01 (ref 1–1.03)
TROPONIN I SERPL-MCNC: 0.08 NG/ML
UA: UC IF INDICATED,UAUC: ABNORMAL
UROBILINOGEN UR QL STRIP.AUTO: 0.2 EU/DL (ref 0.2–1)
VENTRICULAR RATE, ECG03: 152 BPM
WBC # BLD AUTO: 6.2 K/UL (ref 3.6–11)
WBC URNS QL MICRO: ABNORMAL /HPF (ref 0–4)

## 2021-02-22 PROCEDURE — 99285 EMERGENCY DEPT VISIT HI MDM: CPT

## 2021-02-22 PROCEDURE — 87040 BLOOD CULTURE FOR BACTERIA: CPT

## 2021-02-22 PROCEDURE — 80053 COMPREHEN METABOLIC PANEL: CPT

## 2021-02-22 PROCEDURE — 84484 ASSAY OF TROPONIN QUANT: CPT

## 2021-02-22 PROCEDURE — 81001 URINALYSIS AUTO W/SCOPE: CPT

## 2021-02-22 PROCEDURE — 74011000250 HC RX REV CODE- 250: Performed by: EMERGENCY MEDICINE

## 2021-02-22 PROCEDURE — 96374 THER/PROPH/DIAG INJ IV PUSH: CPT

## 2021-02-22 PROCEDURE — 71045 X-RAY EXAM CHEST 1 VIEW: CPT

## 2021-02-22 PROCEDURE — 83605 ASSAY OF LACTIC ACID: CPT

## 2021-02-22 PROCEDURE — 36415 COLL VENOUS BLD VENIPUNCTURE: CPT

## 2021-02-22 PROCEDURE — 85025 COMPLETE CBC W/AUTO DIFF WBC: CPT

## 2021-02-22 PROCEDURE — 83880 ASSAY OF NATRIURETIC PEPTIDE: CPT

## 2021-02-22 PROCEDURE — 83735 ASSAY OF MAGNESIUM: CPT

## 2021-02-22 PROCEDURE — 93005 ELECTROCARDIOGRAM TRACING: CPT

## 2021-02-22 RX ORDER — LIDOCAINE 4 G/100G
1 PATCH TOPICAL EVERY 12 HOURS
Qty: 10 PATCH | Refills: 0 | Status: SHIPPED | OUTPATIENT
Start: 2021-02-22

## 2021-02-22 RX ORDER — DILTIAZEM HYDROCHLORIDE 5 MG/ML
0.25 INJECTION INTRAVENOUS
Status: COMPLETED | OUTPATIENT
Start: 2021-02-22 | End: 2021-02-22

## 2021-02-22 RX ORDER — DILTIAZEM HYDROCHLORIDE EXTENDED-RELEASE TABLETS 180 MG/1
180 TABLET, EXTENDED RELEASE ORAL 2 TIMES DAILY
Qty: 30 TAB | Refills: 0 | Status: SHIPPED
Start: 2021-02-22

## 2021-02-22 RX ORDER — TRAMADOL HYDROCHLORIDE 50 MG/1
50 TABLET ORAL
Qty: 10 TAB | Refills: 0 | Status: SHIPPED | OUTPATIENT
Start: 2021-02-22 | End: 2021-02-25

## 2021-02-22 RX ADMIN — DILTIAZEM HYDROCHLORIDE 16.5 MG: 5 INJECTION INTRAVENOUS at 12:55

## 2021-02-22 NOTE — ED PROVIDER NOTES
EMERGENCY DEPARTMENT HISTORY AND PHYSICAL EXAM      Date: 2/22/2021  Patient Name: Quinton Mcghee  Patient Age and Sex: 64 y.o. female     History of Presenting Illness     Chief Complaint   Patient presents with    Irregular Heart Beat     Sent by Lissette Fiore NP office for SVT. When patient stood to come into triage, she almost passed out. Taken immediately to a room. History Provided By: Patient    HPI: Quinton Mcghee is a 49-year-old female with a history of atrial fibrillation, hypertension presenting with fast heart rate. Patient states that 1 week ago she was diagnosed with urinary tract infection and was feeling better after it but then started to have some allover back pain so went to her primary care doctor's office to get a repeat urinalysis to check for kidney infection and while there was told that her heart rate was very fast.  Nurse reported that when patient got up in triage she felt very lightheaded like she was going to pass out. Patient denies any chest pain, nausea, vomiting, shortness of breath with this. States that she just recently had an ablation with Dr. Michelle Gaines. Her cardiologist is Dr. Santiago Connolly. There are no other complaints, changes, or physical findings at this time. PCP: Amadeo Garcia NP    No current facility-administered medications on file prior to encounter. Current Outpatient Medications on File Prior to Encounter   Medication Sig Dispense Refill    omeprazole (PRILOSEC) 20 mg capsule Take 1 Cap by mouth daily. Indications: an ulcer of the duodenum, erosive gastritis 30 Cap 5    aspirin delayed-release 81 mg tablet Take 81 mg by mouth daily.  acetaminophen (Tylenol Extra Strength) 500 mg tablet Take 1,000 mg by mouth every six (6) hours as needed for Pain.  nebivoloL (Bystolic) 2.5 mg tablet Take 2.5 mg by mouth nightly.  docusate sodium (Stool Softener) 100 mg capsule Take 100 mg by mouth two (2) times a day.       levalbuterol tartrate (XOPENEX) 45 mcg/actuation inhaler INHALE 1 PUFF BY MOUTH EVERY 4 HOURS AS NEEDED  3    losartan (COZAAR) 50 mg tablet Take 25 mg by mouth daily. 1/2 tablet daily      furosemide (LASIX) 20 mg tablet 1 tab every other day as needed as per cardiology (Patient taking differently: Take 40 mg by mouth daily. 40 mg daily and may take 1/2 extra tablet as needed in the afternoon for edema) 30 Tab 0    Blood-Glucose Meter monitoring kit With testing strips & lancets 1 month supply 1 Kit 0    gemfibrozil (LOPID) 600 mg tablet Take 600 mg by mouth two (2) times a day.  sitaGLIPtin (JANUVIA) 100 mg tablet Take 100 mg by mouth daily.  [DISCONTINUED] diltiazem hcl 180 mg Tb24 Take 1 Tab by mouth nightly.  buPROPion SR (WELLBUTRIN SR) 150 mg SR tablet Take 150 mg by mouth two (2) times a day.  mometasone (ASMANEX TWISTHALER) 220 mcg (30 doses) inhalation capsule Take 1 Cap by inhalation two (2) times a day.  montelukast (SINGULAIR) 10 mg tablet Take 10 mg by mouth nightly.          Past History     Past Medical History:  Past Medical History:   Diagnosis Date    Arrhythmia     AFIB    Arthritis     Asthma     CAD (coronary artery disease)     Diabetes ([address]     Heart failure (HCC)     Hepatitis     AGE 8     High cholesterol     Hypertension     Other ill-defined conditions(799.89)     heart palpitations    Psychiatric disorder     depression    S/P ablation of atrial fibrillation 1/28/2021 1/28/2021 PVI Afib ablation    Spinal stenosis     Thromboembolus ([address]     PULMONARY EMBOLIS IN BOTH LUNGS    Vitreous detachment of left eye     BLIND IN LEFT EYE       Past Surgical History:  Past Surgical History:   Procedure Laterality Date    COLONOSCOPY N/A 4/3/2019    COLONOSCOPY performed by Melanie Reed MD at [facility]; HI RISK IND  4/3/2019         HX CATARACT REMOVAL Bilateral     HX HYSTERECTOMY      HX KNEE REPLACEMENT Right unicodylar knee replacement    HX OTHER SURGICAL      foot left    HX TONSILLECTOMY      INTRACARD ECHO, THER/DX INTERVENT N/A 1/28/2021    Intracardiac Echocardiogram performed by Mary Kate Quinonez MD at [facility]    MA ABLATE L/R ATRIAL FIBRIL W/ISOLATED PULM VEIN N/A 1/28/2021    Ablation Following A-Fib  Addl performed by Mary Kate Quinonez MD at [facility]    MA COMPRE ELECTROPHYSIOL XM W/LEFT ATRIAL PACNG/REC N/A 1/28/2021    Lt Atrial Pace & Record During Ep Study performed by Mary Kate Quinonez MD at [facility]    MA EPHYS EVL TRNSPTL TX ATRIAL FIB ISOLAT PULM VEIN N/A 1/28/2021    ABLATION A-FIB  W COMPLETE EP STUDY performed by Mary Kate Quinonez MD at [facility]    MA ICAR CATHETER ABLATION ARRHYTHMIA ADD ON N/A 1/28/2021    Ablation Svt/Vt Add On performed by Mary Kate Quinonez MD at [facility]    MA INTRACARDIAC ELECTROPHYSIOLOGIC 3D MAPPING N/A 1/28/2021    Ep 3d Mapping performed by Mary Kate Quinonez MD at [facility]    UPPER GI ENDOSCOPY,BIOPSY  9/24/2020            Family History:  Family History   Problem Relation Age of Onset    Cancer Father     Stroke Father     Heart Disease Paternal Grandfather     Heart Failure Paternal Grandfather     Stroke Paternal Grandfather        Social History:  Social History     Tobacco Use    Smoking status: Never Smoker    Smokeless tobacco: Never Used   Substance Use Topics    Alcohol use: No    Drug use: Never       Allergies:   Allergies   Allergen Reactions    Baclofen Other (comments)     Hallucinations    Albuterol Palpitations    Bactrim [Sulfamethoxazole-Trimethoprim] Not Reported This Time    Diuretic Softgels [Pamabrom] Other (comments)     Lowers potassium, very sensitive      Iodine Unknown (comments)    Lipitor [Atorvastatin] Myalgia    Shellfish Containing Products Unknown (comments)     \"got choked, It might have been the spices\"    Tizanidine Unknown (comments)         Review of Systems   Review of Systems   Constitutional: Negative for chills and fever. Respiratory: Negative for cough and shortness of breath. Cardiovascular: Positive for palpitations. Negative for chest pain. Gastrointestinal: Negative for abdominal pain, constipation, diarrhea, nausea and vomiting. Genitourinary: Negative for dysuria, frequency and hematuria. Neurological: Positive for light-headedness. Negative for weakness and numbness. All other systems reviewed and are negative. Physical Exam   Physical Exam  Vitals signs and nursing note reviewed. Constitutional:       Appearance: She is well-developed. HENT:      Head: Normocephalic and atraumatic. Nose: Nose normal.      Mouth/Throat:      Mouth: Mucous membranes are moist.   Eyes:      Extraocular Movements: Extraocular movements intact. Conjunctiva/sclera: Conjunctivae normal.   Neck:      Musculoskeletal: Normal range of motion and neck supple. Cardiovascular:      Rate and Rhythm: Tachycardia present. Rhythm irregular. Pulmonary:      Effort: Pulmonary effort is normal. No respiratory distress. Breath sounds: Normal breath sounds. Abdominal:      General: There is no distension. Palpations: Abdomen is soft. Tenderness: There is no abdominal tenderness. Musculoskeletal: Normal range of motion. Skin:     General: Skin is warm and dry. Neurological:      General: No focal deficit present. Mental Status: She is alert and oriented to person, place, and time. Mental status is at baseline.    Psychiatric:         Mood and Affect: Mood normal.          Diagnostic Study Results     Labs -     Recent Results (from the past 12 hour(s))   CBC WITH AUTOMATED DIFF    Collection Time: 02/22/21  1:01 PM   Result Value Ref Range    WBC 6.2 3.6 - 11.0 K/uL    RBC 4.19 3.80 - 5.20 M/uL    HGB 11.6 11.5 - 16.0 g/dL    HCT 37.4 35.0 - 47.0 %    MCV 89.3 80.0 - 99.0 FL    MCH 27.7 26.0 - 34.0 PG    MCHC 31.0 30.0 - 36.5 g/dL    RDW 14.6 (H) 11.5 - 14.5 %    PLATELET 281 230 - 467 K/uL    MPV 10.8 8.9 - 12.9 FL    NRBC 0.0 0  WBC    ABSOLUTE NRBC 0.00 0.00 - 0.01 K/uL    NEUTROPHILS 64 32 - 75 %    LYMPHOCYTES 17 12 - 49 %    MONOCYTES 12 5 - 13 %    EOSINOPHILS 6 0 - 7 %    BASOPHILS 1 0 - 1 %    IMMATURE GRANULOCYTES 0 0.0 - 0.5 %    ABS. NEUTROPHILS 3.9 1.8 - 8.0 K/UL    ABS. LYMPHOCYTES 1.0 0.8 - 3.5 K/UL    ABS. MONOCYTES 0.7 0.0 - 1.0 K/UL    ABS. EOSINOPHILS 0.4 0.0 - 0.4 K/UL    ABS. BASOPHILS 0.1 0.0 - 0.1 K/UL    ABS. IMM. GRANS. 0.0 0.00 - 0.04 K/UL    DF AUTOMATED     METABOLIC PANEL, COMPREHENSIVE    Collection Time: 02/22/21  1:01 PM   Result Value Ref Range    Sodium 136 136 - 145 mmol/L    Potassium 3.7 3.5 - 5.1 mmol/L    Chloride 104 97 - 108 mmol/L    CO2 25 21 - 32 mmol/L    Anion gap 7 5 - 15 mmol/L    Glucose 106 (H) 65 - 100 mg/dL    BUN 23 (H) 6 - 20 MG/DL    Creatinine 1.12 (H) 0.55 - 1.02 MG/DL    BUN/Creatinine ratio 21 (H) 12 - 20      GFR est AA 57 (L) >60 ml/min/1.73m2    GFR est non-AA 47 (L) >60 ml/min/1.73m2    Calcium 9.6 8.5 - 10.1 MG/DL    Bilirubin, total 0.6 0.2 - 1.0 MG/DL    ALT (SGPT) 22 12 - 78 U/L    AST (SGOT) 28 15 - 37 U/L    Alk.  phosphatase 106 45 - 117 U/L    Protein, total 7.7 6.4 - 8.2 g/dL    Albumin 4.2 3.5 - 5.0 g/dL    Globulin 3.5 2.0 - 4.0 g/dL    A-G Ratio 1.2 1.1 - 2.2     NT-PRO BNP    Collection Time: 02/22/21  1:01 PM   Result Value Ref Range    NT pro- (H) <450 PG/ML   LACTIC ACID    Collection Time: 02/22/21  1:01 PM   Result Value Ref Range    Lactic acid 1.2 0.4 - 2.0 MMOL/L   TROPONIN I    Collection Time: 02/22/21  1:01 PM   Result Value Ref Range    Troponin-I, Qt. 0.08 (H) <0.05 ng/mL   MAGNESIUM    Collection Time: 02/22/21  1:01 PM   Result Value Ref Range    Magnesium 2.1 1.6 - 2.4 mg/dL   URINALYSIS W/ REFLEX CULTURE    Collection Time: 02/22/21  1:42 PM    Specimen: Urine   Result Value Ref Range    Color YELLOW/STRAW      Appearance CLOUDY (A) CLEAR      Specific gravity 1.008 1.003 - 1.030      pH (UA) 6.0 5.0 - 8.0      Protein Negative NEG mg/dL    Glucose Negative NEG mg/dL    Ketone Negative NEG mg/dL    Bilirubin Negative NEG      Blood Negative NEG      Urobilinogen 0.2 0.2 - 1.0 EU/dL    Nitrites Negative NEG      Leukocyte Esterase Negative NEG      WBC 0-4 0 - 4 /hpf    RBC 0-5 0 - 5 /hpf    Epithelial cells FEW FEW /lpf    Bacteria Negative NEG /hpf    UA:UC IF INDICATED CULTURE NOT INDICATED BY UA RESULT CNI         Radiologic Studies -   XR CHEST PORT   Final Result   Normal chest.        CT Results  (Last 48 hours)    None        CXR Results  (Last 48 hours)               02/22/21 1321  XR CHEST PORT Final result    Impression:  Normal chest.       Narrative:  EXAM: XR CHEST PORT       INDICATION: Chest Pain       COMPARISON: 8/23/2018       FINDINGS: A portable AP radiograph of the chest was obtained at 1310 hours. The   patient is on a cardiac monitor. The lungs are clear. There is an azygos lobe   and fissure. The cardiac and mediastinal contours and pulmonary vascularity are   normal.  The bones and soft tissues are grossly within normal limits. Medical Decision Making   I am the first provider for this patient. I reviewed the vital signs, available nursing notes, past medical history, past surgical history, family history and social history. Vital Signs-Reviewed the patient's vital signs. Patient Vitals for the past 12 hrs:   Temp Pulse Resp BP SpO2   02/22/21 1352  77 16 101/71 97 %   02/22/21 1330  75 17  97 %   02/22/21 1253 97.8 °F (36.6 °C) (!) 152 18 135/85 99 %   02/22/21 1250    134/85        Records Reviewed: Nursing Notes and Old Medical Records    Provider Notes (Medical Decision Making):   Patient presenting with tachycardia and appears to be atrial fibrillation on the monitor even though EKG showing SVT. Has a history of atrial fibrillation.   We will give her a dose of diltiazem and get labs looking at her electrolytes as well as troponin to rule out ACS. We will also get chest x-ray to rule out any signs of pulmonary edema or pneumonia. Given her back pain and urine history we will get urinalysis. ED Course:   Initial assessment performed. The patients presenting problems have been discussed, and they are in agreement with the care plan formulated and outlined with them. I have encouraged them to ask questions as they arise throughout their visit. ED Course as of Feb 22 1443   Mon Feb 22, 2021   1307 EKG interpreted by me. Shows SVT versus atrial flutter versus A. fib with RVR with a heart rate of 152. No ST elevations or depressions concerning for ischemia. [JS]   3907 After patient was given diltiazem 60 mg her heart rate improved to the 80s but patient was still in atrial fibrillation. [JS]   4181 Patient continues to be rate controlled. Work pretty unremarkable except for troponin of 0.08 similar to prior. Waiting on cardiology to call back. She has been on warfarin. [JS]   1400 Spoke with BELLA Carlos who recommended increasing patient's diltiazem to 180 mg twice a day as opposed to daily and having her follow-up with Dr. Tavo Hercules and Dr. Elan Mock. Patient continues to be rate controlled. Waiting on urinalysis. [JS]      ED Course User Index  [JS] Angela Llanes MD     Critical Care Time:   0    Disposition:  Discharge Note:  The patient has been re-evaluated and is ready for discharge. Reviewed available results with patient. Counseled patient on diagnosis and care plan. Patient has expressed understanding, and all questions have been answered. Patient agrees with plan and agrees to follow up as recommended, or to return to the ED if their symptoms worsen. Discharge instructions have been provided and explained to the patient, along with reasons to return to the ED.       PLAN:  Current Discharge Medication List START taking these medications    Details   traMADoL (ULTRAM) 50 mg tablet Take 1 Tab by mouth every six (6) hours as needed for Pain for up to 3 days. Max Daily Amount: 200 mg. Indications: pain  Qty: 10 Tab, Refills: 0    Associated Diagnoses: Atrial fibrillation with rapid ventricular response ([address] Acute bilateral low back pain without sciatica      lidocaine 4 % patch 1 Patch by TransDERmal route every twelve (12) hours every twelve (12) hours. Qty: 10 Patch, Refills: 0         CONTINUE these medications which have CHANGED    Details   dilTIAZem ER (CARDIZEM LA) 180 mg tablet Take 1 Tab by mouth two (2) times a day. Qty: 30 Tab, Refills: 0           2. Follow-up Information     Follow up With Specialties Details Why Contact Info    Carmelina Felty, MD Cardiology Schedule an appointment as soon as possible for a visit   [address]  [phone]      Twin Roth MD Cardiology Schedule an appointment as soon as possible for a visit   [address] [facility]  [address]  [address] [phone]          3. Return to ED if worse     Diagnosis     Clinical Impression:   1. Atrial fibrillation with rapid ventricular response ([address]    2. Acute bilateral low back pain without sciatica        Attestations:    Freddie Coronado M.D. Please note that this dictation was completed with [employer], the [employer] voice recognition software. Quite often unanticipated grammatical, syntax, homophones, and other interpretive errors are inadvertently transcribed by the computer software. Please disregard these errors. Please excuse any errors that have escaped final proofreading. Thank you.

## 2021-02-22 NOTE — ED NOTES
PT HR improved with dilt push.  HR now 78.  Patient remains in a-fib.  Spoke with Dr. Lazo who is aware.

## 2021-02-22 NOTE — ED NOTES
Patient discharged by Dr. Lazo. Patient provided with discharge instructions Rx and instructions on follow up care. Patient out of ED ambulatory accompanied by family.

## 2021-02-27 LAB
BACTERIA SPEC CULT: NORMAL
SERVICE CMNT-IMP: NORMAL

## 2021-04-22 RX ORDER — WARFARIN SODIUM 5 MG/1
5 TABLET ORAL DAILY
COMMUNITY
End: 2022-06-24

## 2021-04-22 RX ORDER — WARFARIN 2.5 MG/1
2.5 TABLET ORAL DAILY
COMMUNITY
End: 2022-06-24

## 2021-04-22 RX ORDER — POLYETHYLENE GLYCOL 3350 17 G/17G
17 POWDER, FOR SOLUTION ORAL DAILY
COMMUNITY

## 2021-04-22 NOTE — PERIOP NOTES
[facility]  Ambulatory Surgery Unit  Pre-operative Instructions    Procedure Date  4/27/2021            Tentative Arrival Time 3629      1. On the day of your procedure, please report to the Ambulatory Surgery Unit Registration Desk and sign in at your designated time. The Ambulatory Surgery Unit is located in [facility] on the [facility] side of the [facility] across from the [address] [place] [address]. Please have all of your health insurance cards and a photo ID. 2. You must have someone with you to drive you home as directed by your surgeon. 3. You may have a light breakfast and take normal morning medications. 4. We recommend you do not drink any alcoholic beverages for 24 hours before and after your procedure. 5. Contact your surgeons office for instructions on the following medications: non-steroidal anti-inflammatory drugs (i.e. Advil, Aleve), vitamins, and supplements. (Some surgeons will want you to stop these medications prior to surgery and others may allow you to take them)   **If you are currently taking Plavix, Coumadin, Aspirin and/or other blood-thinning agents, contact your surgeon for instructions. ** Your surgeon will partner with the physician prescribing these medications to determine if it is safe to stop or if you need to continue taking. Please do not stop taking these medications without instructions from your surgeon. 6. In an effort to help prevent surgical site infection, we ask that you shower with an anti-bacterial soap (i.e. Dial or Safeguard) on the morning of your procedure. Do not apply any lotions, powders, or deodorants after showering. 7. Wear comfortable clothes. Wear glasses instead of contacts. Do not bring any jewelry or money (other than copays or fees as instructed). Do not wear make-up, particularly mascara, the morning of your procedure. Wear your hair loose or down, no ponytails, buns, eugenio pins or clips.  All body piercings must be removed. 8. You should understand that if you do not follow these instructions your procedure may be cancelled. If your physical condition changes (i.e. fever, cold or flu) please contact your surgeon as soon as possible. 9. It is important that you be on time. If a situation occurs where you may be late, or if you have any questions or problems, please call [phone].    10. Your procedure time may be subject to change. You will receive a phone call the day prior to confirm your arrival time. I understand a pre-operative phone call will be made to verify my procedure time. In the event that I am not available, I give permission for a message to be left on my answering service and/or with another person?       yes         ___________________      ___________________      ___________________  (Signature of Patient)          (Witness)                   (Date and Time)

## 2021-04-27 ENCOUNTER — APPOINTMENT (OUTPATIENT)
Dept: GENERAL RADIOLOGY | Age: 76
End: 2021-04-27
Attending: PHYSICAL MEDICINE & REHABILITATION
Payer: MEDICARE

## 2021-04-27 ENCOUNTER — HOSPITAL ENCOUNTER (OUTPATIENT)
Age: 76
Setting detail: OUTPATIENT SURGERY
Discharge: HOME | End: 2021-04-27
Attending: PHYSICAL MEDICINE & REHABILITATION | Admitting: PHYSICAL MEDICINE & REHABILITATION
Payer: MEDICARE

## 2021-04-27 VITALS
HEART RATE: 67 BPM | HEIGHT: 62 IN | OXYGEN SATURATION: 98 % | TEMPERATURE: 98.3 F | RESPIRATION RATE: 18 BRPM | WEIGHT: 137 LBS | SYSTOLIC BLOOD PRESSURE: 123 MMHG | DIASTOLIC BLOOD PRESSURE: 49 MMHG | BODY MASS INDEX: 25.21 KG/M2

## 2021-04-27 LAB
GLUCOSE BLD STRIP.AUTO-MCNC: 132 MG/DL (ref 65–100)
GLUCOSE BLD STRIP.AUTO-MCNC: 142 MG/DL (ref 65–100)
SERVICE CMNT-IMP: ABNORMAL
SERVICE CMNT-IMP: ABNORMAL

## 2021-04-27 PROCEDURE — 74011250636 HC RX REV CODE- 250/636: Performed by: PHYSICAL MEDICINE & REHABILITATION

## 2021-04-27 PROCEDURE — 76210000046 HC AMBSU PH II REC FIRST 0.5 HR: Performed by: PHYSICAL MEDICINE & REHABILITATION

## 2021-04-27 PROCEDURE — 74011000250 HC RX REV CODE- 250: Performed by: PHYSICAL MEDICINE & REHABILITATION

## 2021-04-27 PROCEDURE — 2709999900 HC NON-CHARGEABLE SUPPLY: Performed by: PHYSICAL MEDICINE & REHABILITATION

## 2021-04-27 PROCEDURE — 77030003666 HC NDL SPINAL BD -A: Performed by: PHYSICAL MEDICINE & REHABILITATION

## 2021-04-27 PROCEDURE — 76000 FLUOROSCOPY <1 HR PHYS/QHP: CPT

## 2021-04-27 PROCEDURE — 82962 GLUCOSE BLOOD TEST: CPT

## 2021-04-27 PROCEDURE — 72020 X-RAY EXAM OF SPINE 1 VIEW: CPT

## 2021-04-27 PROCEDURE — 76030000002 HC AMB SURG OR TIME FIRST 0.: Performed by: PHYSICAL MEDICINE & REHABILITATION

## 2021-04-27 RX ORDER — DEXAMETHASONE SODIUM PHOSPHATE 4 MG/ML
6 INJECTION, SOLUTION INTRA-ARTICULAR; INTRALESIONAL; INTRAMUSCULAR; INTRAVENOUS; SOFT TISSUE ONCE
Status: COMPLETED | OUTPATIENT
Start: 2021-04-27 | End: 2021-04-27

## 2021-04-27 RX ORDER — LIDOCAINE HYDROCHLORIDE 20 MG/ML
5 INJECTION, SOLUTION INFILTRATION; PERINEURAL ONCE
Status: COMPLETED | OUTPATIENT
Start: 2021-04-27 | End: 2021-04-27

## 2021-04-27 RX ORDER — BUPIVACAINE HYDROCHLORIDE 5 MG/ML
5 INJECTION, SOLUTION EPIDURAL; INTRACAUDAL ONCE
Status: COMPLETED | OUTPATIENT
Start: 2021-04-27 | End: 2021-04-27

## 2021-04-27 NOTE — PERIOP NOTES
1415 Pt alert and oriented. VSS. Able to transfer to wheelchair without difficulty. Equal and strong bilateral grasps.

## 2021-04-27 NOTE — H&P
Procedural Case Note    4/27/2021    (1:45 PM)    Anneliese Conway    1945   (64 y.o.)    [id number]    CC:  pain    ROS:   Complete ROS obtained, no CP, no SOB, no N or V    PMH:     Past Medical History:   Diagnosis Date    Arrhythmia     AFIB    Arthritis     Asthma     CAD (coronary artery disease)     Diabetes ([address]     Heart failure (HCC)     Hepatitis     AGE 8     High cholesterol     Hypertension     Other ill-defined conditions(799.89)     heart palpitations    Psychiatric disorder     depression    PUD (peptic ulcer disease)     S/P ablation of atrial fibrillation 1/28/2021 1/28/2021 PVI Afib ablation    Spinal stenosis     Thromboembolus (HCC)     PULMONARY EMBOLIS IN BOTH LUNGS    Vitreous detachment of left eye     BLIND IN LEFT EYE       ALLERGIES:     Allergies   Allergen Reactions    Baclofen Other (comments)     Hallucinations    Albuterol Palpitations    Bactrim [Sulfamethoxazole-Trimethoprim] Not Reported This Time    Diuretic Softgels [Pamabrom] Other (comments)     Lowers potassium, very sensitive      Iodine Unknown (comments)    Lipitor [Atorvastatin] Myalgia    Shellfish Containing Products Unknown (comments)     \"got choked, It might have been the spices\"    Tizanidine Unknown (comments)       MEDS:     No current facility-administered medications for this encounter. Visit Vitals  Ht 5' 2\" (1.575 m)   Wt 65.8 kg (145 lb)   BMI 26.52 kg/m²     PE:  Gen: NAD  Head: normocephalic  Heart: RRR  Lungs: CTA anthony  Abd: NT, ND, soft  Neuro: awake and alert  Skin: intact    IMPRESSION:   Cervical DJD    Note:  The clinical status was discussed in detail with the patient. The procedure was again discussed and described in detail. All understand and accept the planned procedure and risks; reject other forms of treatment. All questions are answered.     Devante Chowdhury MD

## 2021-04-27 NOTE — PERIOP NOTES
Sonia Bhakta  1945  [id number]    Situation:  Verbal report given from: Gurvinder Barnett RN  Procedure: Procedure(s):  BILATERAL C3-4, C4-5, C5-6 FACET INJECTION    Background:    Preoperative diagnosis: Cervical spondylosis [M47.812]    Postoperative diagnosis: * No post-op diagnosis entered *    :  Dr. Aldair Martinez    Assistant(s): Circ-1: Ramírez Rahman RN  Local Nurse Monitor: Chandler Simsub Tech-1: Cisco Suarez    Specimens: * No specimens in log *    Assessment:  Intra-procedure medications         Anesthesia gave intra-procedure sedation and medications, see anesthesia flow sheet     Intravenous fluids: LR@ KVO     Vital signs stable       Recommendation:    Permission to share finding with family : yes

## 2021-04-27 NOTE — DISCHARGE INSTRUCTIONS
Discharge Instructions  Cervical Medial Branch Block  You had a cervical facet injection today. You will probably have some numbness in your neck and/or arms for the next 6 to 8 hours. The steroids will slowly become effective, reducing your pain, over the next 2 weeks. You should begin feeling better after a few days, but it may take up to 2 weeks to notice the difference. The benefit you get from your injection will last a variable amount of time, depending on the severity of your cervical spine problem. If the results you experience are significant, but not long lasting, you may be a candidate for a procedure that can be longer lasting (radiofrequency ablation of the nerves innervating the facet joints).  Pain:  Most people do not have any increase in pain after this injection. However, you might experience some soreness at the site of the injection. If this happens, putting an ice pack over the sore area will help.  Bandage: You have a small bandage covering the site of the injection. You may remove it once you get home.  Restrictions:  Someone should drive you home after the injection. After that, you have no restrictions. You may resume your normal level of activity. You may take a shower or bath, and you may eat normally. You should continue your current exercises and/or therapy routine.  Medications:  Continue your current medications as prescribed. If your pain decreases, you may reduce the amount of your pain medicines. If you stopped taking anticoagulants or blood-thinners before the injection, start them tomorrow. If you have diabetes, your blood sugar may be elevated for a few days. Call your primary doctor with any questions.   Call Dr. Flowers at [phone]  if you experience:   Fever (101 degrees Fahrenheit or greater)   Nausea or vomiting   Headache unrelieved by your normal pain medicine   Redness or swelling at the injection site that lasts more than 1 day   New numbness, tingling, weakness, or pain that you didnt have before the injection. If still having pain in 1-2 weeks, call office at [phone] for a follow up appointment. DISCHARGE SUMMARY from Nurse    The following personal items collected during your admission are returned to you:   Dental Appliance: Dental Appliances: Other (comment)(4 lower implants and a clip over that - entire plate)  Vision: Visual Aid: Glasses  Hearing Aid:    Jewelry: Jewelry: Ring, With patient  Clothing: Clothing: With patient  Other Valuables: Other Valuables: None  Valuables sent to safe: If you were given prescriptions, please review the written information on prescribed medications. · You will receive a Post Operative Call from one of the Recovery Room Nurses on the day after your surgery to check on you. It is very important for us to know how you are recovering after your surgery. · You may receive an e-mail or letter in the mail from [employer] regarding your experience with us in the Ambulatory Surgery Unit. Your feedback is valuable to us and we appreciate your participation in the survey. If you have not had your influenza or pneumococcal vaccines, please follow up with your primary care physician. The discharge information has been reviewed with the patient. The patient verbalized understanding.

## 2021-04-27 NOTE — OP NOTES
Cervical Facet Steroid Injection Operative Report    Indications: This is a 64 y.o. female who presents with cervicalgia. She was positive for cervical DJD. The patient was admitted for surgery as conservative measures have failed. Date of Surgery: 4/27/2021    Preoperative Diagnosis: cervical DJD    Postoperative Diagnosis: cervical DJD    Surgeon(s) and Role:     Colin Lopez MD - Primary     Procedure:  Procedure(s):  BILATERAL C3-4, C4-5, C5-6 FACET INJECTION    Procedure in Detail:  After appropriate informed consent was obtained, the patient was taken to the operating suite and placed in the prone position on the operating table on appropriate padding. The cervical region was prepped and draped in the usual sterile fashion. Intraoperative fluoroscopy was used to localize the cervical spine. The skin was infiltrated with 2% lidocaine. An 25-g needle was advanced into the Qasim C3-4, C4-5, C5-6 facets under fluoroscopic guidance. Next, 4ml of 0.5% marcaine and 4 mg of Dexamethasone were injected. The needle was removed from the patient. The patient was then turned back into the supine position on the stretcher and was taken to the Recovery Room in stable condition.     Estimated Blood Loss:  none     Specimens: None       Drains: None          Complications:  None    Signed By: Tasha Nair MD                        April 27, 2021

## 2021-05-20 ENCOUNTER — APPOINTMENT (OUTPATIENT)
Dept: CT IMAGING | Age: 76
End: 2021-05-20
Attending: EMERGENCY MEDICINE
Payer: MEDICARE

## 2021-05-20 ENCOUNTER — APPOINTMENT (OUTPATIENT)
Dept: GENERAL RADIOLOGY | Age: 76
End: 2021-05-20
Attending: EMERGENCY MEDICINE
Payer: MEDICARE

## 2021-05-20 ENCOUNTER — HOSPITAL ENCOUNTER (EMERGENCY)
Age: 76
Discharge: HOME | End: 2021-05-20
Attending: EMERGENCY MEDICINE
Payer: MEDICARE

## 2021-05-20 VITALS
HEIGHT: 62 IN | TEMPERATURE: 98.3 F | DIASTOLIC BLOOD PRESSURE: 50 MMHG | RESPIRATION RATE: 18 BRPM | BODY MASS INDEX: 25.76 KG/M2 | SYSTOLIC BLOOD PRESSURE: 101 MMHG | HEART RATE: 79 BPM | OXYGEN SATURATION: 98 % | WEIGHT: 140 LBS

## 2021-05-20 DIAGNOSIS — S00.03XA HEMATOMA OF FRONTAL SCALP, INITIAL ENCOUNTER: Primary | ICD-10-CM

## 2021-05-20 DIAGNOSIS — S00.01XA ABRASION OF SCALP, INITIAL ENCOUNTER: ICD-10-CM

## 2021-05-20 DIAGNOSIS — D64.9 ANEMIA, UNSPECIFIED TYPE: ICD-10-CM

## 2021-05-20 LAB
ALBUMIN SERPL-MCNC: 3.7 G/DL (ref 3.5–5)
ALBUMIN/GLOB SERPL: 1.2 {RATIO} (ref 1.1–2.2)
ALP SERPL-CCNC: 78 U/L (ref 45–117)
ALT SERPL-CCNC: 17 U/L (ref 12–78)
ANION GAP SERPL CALC-SCNC: 6 MMOL/L (ref 5–15)
APPEARANCE UR: CLEAR
AST SERPL-CCNC: 24 U/L (ref 15–37)
ATRIAL RATE: 277 BPM
BACTERIA URNS QL MICRO: NEGATIVE /HPF
BASOPHILS # BLD: 0 K/UL (ref 0–0.1)
BASOPHILS NFR BLD: 1 % (ref 0–1)
BILIRUB SERPL-MCNC: 0.9 MG/DL (ref 0.2–1)
BILIRUB UR QL: NEGATIVE
BUN SERPL-MCNC: 21 MG/DL (ref 6–20)
BUN/CREAT SERPL: 22 (ref 12–20)
CALCIUM SERPL-MCNC: 9.2 MG/DL (ref 8.5–10.1)
CALCULATED R AXIS, ECG10: 118 DEGREES
CALCULATED T AXIS, ECG11: 55 DEGREES
CHLORIDE SERPL-SCNC: 106 MMOL/L (ref 97–108)
CO2 SERPL-SCNC: 26 MMOL/L (ref 21–32)
COLOR UR: ABNORMAL
CREAT SERPL-MCNC: 0.96 MG/DL (ref 0.55–1.02)
DIAGNOSIS, 93000: NORMAL
DIFFERENTIAL METHOD BLD: ABNORMAL
EOSINOPHIL # BLD: 0.2 K/UL (ref 0–0.4)
EOSINOPHIL NFR BLD: 4 % (ref 0–7)
EPITH CASTS URNS QL MICRO: ABNORMAL /LPF
ERYTHROCYTE [DISTWIDTH] IN BLOOD BY AUTOMATED COUNT: 15.7 % (ref 11.5–14.5)
GLOBULIN SER CALC-MCNC: 3 G/DL (ref 2–4)
GLUCOSE SERPL-MCNC: 102 MG/DL (ref 65–100)
GLUCOSE UR STRIP.AUTO-MCNC: NEGATIVE MG/DL
HCT VFR BLD AUTO: 26.5 % (ref 35–47)
HEMOCCULT STL QL: NEGATIVE
HGB BLD-MCNC: 7.8 G/DL (ref 11.5–16)
HGB UR QL STRIP: NEGATIVE
HYALINE CASTS URNS QL MICRO: ABNORMAL /LPF (ref 0–5)
IMM GRANULOCYTES # BLD AUTO: 0 K/UL (ref 0–0.04)
IMM GRANULOCYTES NFR BLD AUTO: 0 % (ref 0–0.5)
INR BLD: 2.9 (ref 0.9–1.2)
KETONES UR QL STRIP.AUTO: NEGATIVE MG/DL
LEUKOCYTE ESTERASE UR QL STRIP.AUTO: ABNORMAL
LYMPHOCYTES # BLD: 0.6 K/UL (ref 0.8–3.5)
LYMPHOCYTES NFR BLD: 14 % (ref 12–49)
MAGNESIUM SERPL-MCNC: 2.1 MG/DL (ref 1.6–2.4)
MCH RBC QN AUTO: 26.3 PG (ref 26–34)
MCHC RBC AUTO-ENTMCNC: 29.4 G/DL (ref 30–36.5)
MCV RBC AUTO: 89.2 FL (ref 80–99)
MONOCYTES # BLD: 0.6 K/UL (ref 0–1)
MONOCYTES NFR BLD: 14 % (ref 5–13)
NEUTS SEG # BLD: 3.2 K/UL (ref 1.8–8)
NEUTS SEG NFR BLD: 67 % (ref 32–75)
NITRITE UR QL STRIP.AUTO: NEGATIVE
NRBC # BLD: 0 K/UL (ref 0–0.01)
NRBC BLD-RTO: 0 PER 100 WBC
PH UR STRIP: 6.5 [PH] (ref 5–8)
PLATELET # BLD AUTO: 113 K/UL (ref 150–400)
PMV BLD AUTO: 11.1 FL (ref 8.9–12.9)
POTASSIUM SERPL-SCNC: 3.7 MMOL/L (ref 3.5–5.1)
PROT SERPL-MCNC: 6.7 G/DL (ref 6.4–8.2)
PROT UR STRIP-MCNC: NEGATIVE MG/DL
Q-T INTERVAL, ECG07: 408 MS
QRS DURATION, ECG06: 86 MS
QTC CALCULATION (BEZET), ECG08: 443 MS
RBC # BLD AUTO: 2.97 M/UL (ref 3.8–5.2)
RBC #/AREA URNS HPF: ABNORMAL /HPF (ref 0–5)
RBC MORPH BLD: ABNORMAL
SODIUM SERPL-SCNC: 138 MMOL/L (ref 136–145)
SP GR UR REFRACTOMETRY: 1.01 (ref 1–1.03)
TROPONIN I SERPL-MCNC: 0.09 NG/ML
TROPONIN I SERPL-MCNC: 0.09 NG/ML
UA: UC IF INDICATED,UAUC: ABNORMAL
UROBILINOGEN UR QL STRIP.AUTO: 0.2 EU/DL (ref 0.2–1)
VENTRICULAR RATE, ECG03: 71 BPM
WBC # BLD AUTO: 4.6 K/UL (ref 3.6–11)
WBC URNS QL MICRO: ABNORMAL /HPF (ref 0–4)

## 2021-05-20 PROCEDURE — 74011250636 HC RX REV CODE- 250/636: Performed by: EMERGENCY MEDICINE

## 2021-05-20 PROCEDURE — 85025 COMPLETE CBC W/AUTO DIFF WBC: CPT

## 2021-05-20 PROCEDURE — 70486 CT MAXILLOFACIAL W/O DYE: CPT

## 2021-05-20 PROCEDURE — 96361 HYDRATE IV INFUSION ADD-ON: CPT

## 2021-05-20 PROCEDURE — 36415 COLL VENOUS BLD VENIPUNCTURE: CPT

## 2021-05-20 PROCEDURE — 82272 OCCULT BLD FECES 1-3 TESTS: CPT

## 2021-05-20 PROCEDURE — 84484 ASSAY OF TROPONIN QUANT: CPT

## 2021-05-20 PROCEDURE — 81001 URINALYSIS AUTO W/SCOPE: CPT

## 2021-05-20 PROCEDURE — 85610 PROTHROMBIN TIME: CPT

## 2021-05-20 PROCEDURE — 83735 ASSAY OF MAGNESIUM: CPT

## 2021-05-20 PROCEDURE — 93005 ELECTROCARDIOGRAM TRACING: CPT

## 2021-05-20 PROCEDURE — 74176 CT ABD & PELVIS W/O CONTRAST: CPT

## 2021-05-20 PROCEDURE — 70450 CT HEAD/BRAIN W/O DYE: CPT

## 2021-05-20 PROCEDURE — 80053 COMPREHEN METABOLIC PANEL: CPT

## 2021-05-20 PROCEDURE — 99285 EMERGENCY DEPT VISIT HI MDM: CPT

## 2021-05-20 PROCEDURE — 72125 CT NECK SPINE W/O DYE: CPT

## 2021-05-20 PROCEDURE — 71045 X-RAY EXAM CHEST 1 VIEW: CPT

## 2021-05-20 PROCEDURE — 96360 HYDRATION IV INFUSION INIT: CPT

## 2021-05-20 RX ADMIN — SODIUM CHLORIDE 500 ML: 9 INJECTION, SOLUTION INTRAVENOUS at 09:22

## 2021-05-20 NOTE — DISCHARGE INSTRUCTIONS
It was a pleasure taking care of you in our Emergency Department today. We know that when you come to [address] [place] [address], you are entrusting us with your health, comfort, and safety. Our physicians and nurses honor that trust, and truly appreciate the opportunity to care for you and your loved ones. We also value your feedback. If you receive a survey about your Emergency Department experience today, please fill it out. We care about our patients' feedback, and we listen to what you have to say. Thank you!

## 2021-05-20 NOTE — ED NOTES
Pt ambulated in hallway with standby assist without incident. Per pt \" I feel like I'm back to normal with walking\".  Dr. Waylon Main informed, will evaluate pt

## 2021-05-20 NOTE — ED NOTES
Pt assisted out to car in wheelchair accompanied by . Provided with DC instructions, verbalized understanding.  Dressing applied to forehead/eye wound

## 2021-05-20 NOTE — ED PROVIDER NOTES
EMERGENCY DEPARTMENT HISTORY AND PHYSICAL EXAM      Date: 5/20/2021  Patient Name: Christel Milton    History of Presenting Illness     Chief Complaint   Patient presents with    Fall     mechanical GLF in the middle of the night while getting out of bed to use restroom - normally wakes  but did not this time; unstead on her feet at baseline; pt with large hematoma to R side of face, with large amount of swelling around R eye; pt is on Coumadin    Head Injury       History Provided By: Patient    HPI: Christel Milton, 64 y.o. female with a past medical history significant for atrial fibrillation status post ablation, on Coumadin, hypertension, CHF, diabetes presents to the ED with cc of moderate pain over her right forehead and. She reports she woke up this morning and tripped over a rug and fell and hit the right side of her head. She reports she did not lose consciousness and remembers the fall. She has noted significant swelling to the forehead and eye lid which she believes is due to her Coumadin use. She has no other neck pain, chest pain, or any other associated symptoms. No other exacerbating ameliorating factors. There are no other complaints, changes, or physical findings at this time. PCP: Rita Ashton NP    No current facility-administered medications on file prior to encounter. Current Outpatient Medications on File Prior to Encounter   Medication Sig Dispense Refill    polyethylene glycol (Miralax) 17 gram/dose powder Take 17 g by mouth daily.  warfarin (COUMADIN) 5 mg tablet Take 5 mg by mouth daily. Daily on Mon. Tues,Thurs, Fri, Sat, Sunday      warfarin (COUMADIN) 2.5 mg tablet Take 2.5 mg by mouth daily. Wednesday's only      dilTIAZem ER (CARDIZEM LA) 180 mg tablet Take 1 Tab by mouth two (2) times a day. 30 Tab 0    lidocaine 4 % patch 1 Patch by TransDERmal route every twelve (12) hours every twelve (12) hours.  10 Patch 0    omeprazole (PRILOSEC) 20 mg capsule Take 1 Cap by mouth daily. Indications: an ulcer of the duodenum, erosive gastritis 30 Cap 5    acetaminophen (Tylenol Extra Strength) 500 mg tablet Take 1,000 mg by mouth every six (6) hours as needed for Pain.  nebivoloL (Bystolic) 2.5 mg tablet Take 2.5 mg by mouth nightly.  levalbuterol tartrate (XOPENEX) 45 mcg/actuation inhaler INHALE 1 PUFF BY MOUTH EVERY 4 HOURS AS NEEDED  3    losartan (COZAAR) 50 mg tablet Take 25 mg by mouth daily. 1/2 tablet daily      furosemide (LASIX) 20 mg tablet 1 tab every other day as needed as per cardiology (Patient taking differently: Take 40 mg by mouth daily. 40 mg daily and may take 1/2 extra tablet as needed in the afternoon for edema) 30 Tab 0    Blood-Glucose Meter monitoring kit With testing strips & lancets 1 month supply 1 Kit 0    gemfibrozil (LOPID) 600 mg tablet Take 600 mg by mouth two (2) times a day.  sitaGLIPtin (JANUVIA) 100 mg tablet Take 100 mg by mouth daily.  buPROPion SR (WELLBUTRIN SR) 150 mg SR tablet Take 150 mg by mouth two (2) times a day.  mometasone (ASMANEX TWISTHALER) 220 mcg (30 doses) inhalation capsule Take 1 Cap by inhalation two (2) times a day.  montelukast (SINGULAIR) 10 mg tablet Take 10 mg by mouth nightly.          Past History     Past Medical History:  Past Medical History:   Diagnosis Date    Arrhythmia     AFIB    Arthritis     Asthma     Atrial fibrillation ([address]     CAD (coronary artery disease)     Diabetes (HCC)     Heart failure (HCC)     Hepatitis     AGE 8     High cholesterol     Hypertension     Other ill-defined conditions(799.89)     heart palpitations    Psychiatric disorder     depression    PUD (peptic ulcer disease)     S/P ablation of atrial fibrillation 1/28/2021 1/28/2021 PVI Afib ablation    Spinal stenosis     Thromboembolus ([address]     PULMONARY EMBOLIS IN BOTH LUNGS    Vitreous detachment of left eye     BLIND IN LEFT EYE       Past Surgical History:  Past Surgical History:   Procedure Laterality Date    COLONOSCOPY N/A 4/3/2019    COLONOSCOPY performed by Josiane Ruggiero MD at [address]; HI RISK IND  4/3/2019         HX CATARACT REMOVAL Bilateral     HX HYSTERECTOMY      HX KNEE REPLACEMENT Right     unicodylar knee replacement    HX OTHER SURGICAL      foot left    HX TONSILLECTOMY      INTRACARD ECHO, THER/DX INTERVENT N/A 1/28/2021    Intracardiac Echocardiogram performed by Lobito Tadeo MD at [facility]    [place] ABLATE L/R ATRIAL FIBRIL W/ISOLATED PULM VEIN N/A 1/28/2021    Ablation Following A-Fib  Addl performed by Lobito Tadeo MD at [facility]    [place] COMPRE ELECTROPHYSIOL XM W/LEFT ATRIAL PACNG/REC N/A 1/28/2021    Lt Atrial Pace & Record During Ep Study performed by Lobito Tadeo MD at [facility]    [place] EPHYS EVL TRNSPTL TX ATRIAL FIB ISOLAT PULM VEIN N/A 1/28/2021    ABLATION A-FIB  W COMPLETE EP STUDY performed by Lobito Tadeo MD at [facility]    [place] ICAR CATHETER ABLATION ARRHYTHMIA ADD ON N/A 1/28/2021    Ablation Svt/Vt Add On performed by Lobito Tadeo MD at [facility]    [place] INTRACARDIAC ELECTROPHYSIOLOGIC 3D MAPPING N/A 1/28/2021    Ep 3d Mapping performed by Lobito Tadeo MD at [facility]    UPPER GI ENDOSCOPY,BIOPSY  9/24/2020            Family History:  Family History   Problem Relation Age of Onset    Cancer Father     Stroke Father     Heart Disease Paternal Grandfather     Heart Failure Paternal Grandfather     Stroke Paternal Grandfather        Social History:  Social History     Tobacco Use    Smoking status: Never Smoker    Smokeless tobacco: Never Used   Substance Use Topics    Alcohol use: No    Drug use: Never       Allergies:   Allergies   Allergen Reactions    Baclofen Other (comments)     Hallucinations    Albuterol Palpitations    Bactrim [Sulfamethoxazole-Trimethoprim] Not Reported This Time    Diuretic Softgels [Pamabrom] Other (comments)     Lowers potassium, very sensitive      Iodine Unknown (comments)    Lipitor [Atorvastatin] Myalgia    Shellfish Containing Products Unknown (comments)     \"got choked, It might have been the spices\"    Tizanidine Unknown (comments)         Review of Systems   Review of Systems   Constitutional: Negative for chills, diaphoresis, fatigue and fever. HENT: Negative for ear pain and sore throat. Eyes: Negative for pain and redness. Respiratory: Negative for cough and shortness of breath. Cardiovascular: Negative for chest pain and leg swelling. Gastrointestinal: Negative for abdominal pain, diarrhea, nausea and vomiting. Endocrine: Negative for cold intolerance and heat intolerance. Genitourinary: Negative for flank pain and hematuria. Musculoskeletal: Negative for back pain and neck stiffness. Skin: Negative for rash and wound. Neurological: Positive for headaches. Negative for dizziness and syncope. All other systems reviewed and are negative. Physical Exam   Physical Exam  Vitals and nursing note reviewed. Constitutional:       Appearance: She is well-developed. HENT:      Head: Normocephalic and atraumatic. Comments: Patient with severe swelling to the right forehead and right temple with soft tissue scalp hematoma. Also moderate swelling over the right upper eyelid. She has no bony tenderness throughout the face, head, or neck. Mouth/Throat:      Pharynx: No oropharyngeal exudate. Eyes:      Conjunctiva/sclera: Conjunctivae normal.      Pupils: Pupils are equal, round, and reactive to light. Cardiovascular:      Rate and Rhythm: Normal rate and regular rhythm. Heart sounds: No murmur heard. Pulmonary:      Effort: Pulmonary effort is normal. No respiratory distress. Breath sounds: Normal breath sounds. No wheezing.    Abdominal:      General: Bowel sounds are normal. There is no distension. Palpations: Abdomen is soft. Tenderness: There is no abdominal tenderness. Musculoskeletal:         General: No deformity. Normal range of motion. Cervical back: Normal range of motion. No signs of trauma or rigidity. No pain with movement, spinous process tenderness or muscular tenderness. Normal range of motion. Skin:     General: Skin is warm and dry. Findings: No rash. Neurological:      Mental Status: She is alert and oriented to person, place, and time. GCS: GCS eye subscore is 4. GCS verbal subscore is 5. GCS motor subscore is 6. Cranial Nerves: Cranial nerves are intact. No cranial nerve deficit, dysarthria or facial asymmetry. Sensory: Sensation is intact. Motor: Motor function is intact. Coordination: Coordination is intact. Coordination normal.      Gait: Gait is intact.    Psychiatric:         Behavior: Behavior normal.         Diagnostic Study Results     Labs -     Recent Results (from the past 24 hour(s))   URINALYSIS W/ REFLEX CULTURE    Collection Time: 05/20/21  5:55 AM    Specimen: Urine   Result Value Ref Range    Color YELLOW/STRAW      Appearance CLEAR CLEAR      Specific gravity 1.009 1.003 - 1.030      pH (UA) 6.5 5.0 - 8.0      Protein Negative NEG mg/dL    Glucose Negative NEG mg/dL    Ketone Negative NEG mg/dL    Bilirubin Negative NEG      Blood Negative NEG      Urobilinogen 0.2 0.2 - 1.0 EU/dL    Nitrites Negative NEG      Leukocyte Esterase MODERATE (A) NEG      WBC 5-10 0 - 4 /hpf    RBC 0-5 0 - 5 /hpf    Epithelial cells FEW FEW /lpf    Bacteria Negative NEG /hpf    UA:UC IF INDICATED CULTURE NOT INDICATED BY UA RESULT CNI      Hyaline cast 0-2 0 - 5 /lpf   POC INR    Collection Time: 05/20/21  6:59 AM   Result Value Ref Range    INR (POC) 2.9 (H) <1.2     CBC WITH AUTOMATED DIFF    Collection Time: 05/20/21  7:07 AM   Result Value Ref Range    WBC 4.6 3.6 - 11.0 K/uL    RBC 2.97 (L) 3.80 - 5.20 M/uL    HGB 7.8 (L) 11.5 - 16.0 g/dL    HCT 26.5 (L) 35.0 - 47.0 %    MCV 89.2 80.0 - 99.0 FL    MCH 26.3 26.0 - 34.0 PG    MCHC 29.4 (L) 30.0 - 36.5 g/dL    RDW 15.7 (H) 11.5 - 14.5 %    PLATELET 706 (L) 997 - 400 K/uL    MPV 11.1 8.9 - 12.9 FL    NRBC 0.0 0  WBC    ABSOLUTE NRBC 0.00 0.00 - 0.01 K/uL    NEUTROPHILS 67 32 - 75 %    LYMPHOCYTES 14 12 - 49 %    MONOCYTES 14 (H) 5 - 13 %    EOSINOPHILS 4 0 - 7 %    BASOPHILS 1 0 - 1 %    IMMATURE GRANULOCYTES 0 0.0 - 0.5 %    ABS. NEUTROPHILS 3.2 1.8 - 8.0 K/UL    ABS. LYMPHOCYTES 0.6 (L) 0.8 - 3.5 K/UL    ABS. MONOCYTES 0.6 0.0 - 1.0 K/UL    ABS. EOSINOPHILS 0.2 0.0 - 0.4 K/UL    ABS. BASOPHILS 0.0 0.0 - 0.1 K/UL    ABS. IMM. GRANS. 0.0 0.00 - 0.04 K/UL    DF SMEAR SCANNED      RBC COMMENTS ANISOCYTOSIS  1+       METABOLIC PANEL, COMPREHENSIVE    Collection Time: 05/20/21  7:07 AM   Result Value Ref Range    Sodium 138 136 - 145 mmol/L    Potassium 3.7 3.5 - 5.1 mmol/L    Chloride 106 97 - 108 mmol/L    CO2 26 21 - 32 mmol/L    Anion gap 6 5 - 15 mmol/L    Glucose 102 (H) 65 - 100 mg/dL    BUN 21 (H) 6 - 20 MG/DL    Creatinine 0.96 0.55 - 1.02 MG/DL    BUN/Creatinine ratio 22 (H) 12 - 20      GFR est AA >60 >60 ml/min/1.73m2    GFR est non-AA 56 (L) >60 ml/min/1.73m2    Calcium 9.2 8.5 - 10.1 MG/DL    Bilirubin, total 0.9 0.2 - 1.0 MG/DL    ALT (SGPT) 17 12 - 78 U/L    AST (SGOT) 24 15 - 37 U/L    Alk.  phosphatase 78 45 - 117 U/L    Protein, total 6.7 6.4 - 8.2 g/dL    Albumin 3.7 3.5 - 5.0 g/dL    Globulin 3.0 2.0 - 4.0 g/dL    A-G Ratio 1.2 1.1 - 2.2     TROPONIN I    Collection Time: 05/20/21  7:07 AM   Result Value Ref Range    Troponin-I, Qt. 0.09 (H) <0.05 ng/mL   MAGNESIUM    Collection Time: 05/20/21  7:07 AM   Result Value Ref Range    Magnesium 2.1 1.6 - 2.4 mg/dL   EKG, 12 LEAD, INITIAL    Collection Time: 05/20/21  7:10 AM   Result Value Ref Range    Ventricular Rate 71 BPM    Atrial Rate 277 BPM    QRS Duration 86 ms    Q-T Interval 408 ms    QTC Calculation (Bezet) 443 ms    Calculated R Axis 118 degrees    Calculated T Axis 55 degrees    Diagnosis       Atrial fibrillation  Right axis deviation  Pulmonary disease pattern  Septal infarct (cited on or before 22-FEB-2021)  When compared with ECG of 22-FEB-2021 12:46,  Atrial fibrillation has replaced Sinus rhythm  Vent. rate has decreased BY  81 BPM  Confirmed by Tre Rea ([id number]) on 5/20/2021 9:21:34 AM     OCCULT BLOOD, STOOL    Collection Time: 05/20/21  8:31 AM   Result Value Ref Range    Occult blood, stool Negative NEG     TROPONIN I    Collection Time: 05/20/21  9:30 AM   Result Value Ref Range    Troponin-I, Qt. 0.09 (H) <0.05 ng/mL       Radiologic Studies -   XR CHEST PORT   Final Result   No Acute Disease. CT ABD PELV WO CONT   Final Result   Compression fractures of L1 and L2, potentially acute/subacute. No acute   intra-abdominal abnormality is identified, however evaluation of solid organ   injury is limited by lack of intravenous contrast. Small right pleural effusion. Cirrhotic morphology of the liver. CT SPINE CERV WO CONT   Final Result   1. No evidence of fracture or subluxation. Degenerative changes predominantly   at C5-6 and C6-7      2. Spiculated opacities in the right upper lobe nonspecific. This may represent   pleural-based scarring or atelectasis. CT HEAD WO CONT   Final Result   No acute intracranial abnormality. Large right frontal scalp hematoma extending   inferiorly over the right orbit            CT MAXILLOFACIAL WO CONT   Final Result   No definite fractures identified. Large right frontal scalp hematoma extending   around the right orbit. CT Results  (Last 48 hours)               05/20/21 0838  CT ABD PELV WO CONT Final result    Impression:  Compression fractures of L1 and L2, potentially acute/subacute.  No acute   intra-abdominal abnormality is identified, however evaluation of solid organ   injury is limited by lack of intravenous contrast. Small right pleural effusion. Cirrhotic morphology of the liver. Narrative:  EXAM: CT ABD PELV WO CONT       INDICATION: fall, trauma, on coumadin, drop in hgb       COMPARISON: None       CONTRAST:  None. TECHNIQUE:    Thin axial images were obtained through the abdomen and pelvis. Coronal and   sagittal reformats were generated. Oral contrast was not administered. CT dose   reduction was achieved through use of a standardized protocol tailored for this   examination and automatic exposure control for dose modulation. The absence of intravenous contrast material reduces the sensitivity for   evaluation of the vasculature and solid organs. FINDINGS:    LOWER THORAX: Small right pleural effusion with underlying atelectasis. LIVER: Cirrhotic morphology of the liver without mass lesion given the lack of   intravenous contrast.   BILIARY TREE: Distended but no stones are identified. CBD is not dilated. SPLEEN: within normal limits. PANCREAS: No focal abnormality. ADRENALS: Unremarkable. KIDNEYS/URETERS: No calculus or hydronephrosis. STOMACH: Unremarkable. SMALL BOWEL: No dilatation or wall thickening. COLON: No dilatation or wall thickening. APPENDIX: Within normal limits. PERITONEUM: No ascites or pneumoperitoneum. RETROPERITONEUM: No lymphadenopathy or aortic aneurysm. REPRODUCTIVE ORGANS: The uterus is surgically absent. URINARY BLADDER: End of the normal in appearance. BONES: Degenerative changes are seen in the lumbar spine. Compression fractures   are noted of L1 and L2, potentially acute/subacute. ABDOMINAL WALL: No mass or hernia. ADDITIONAL COMMENTS: N/A           05/20/21 0616  CT SPINE CERV WO CONT Final result    Impression:  1. No evidence of fracture or subluxation. Degenerative changes predominantly   at C5-6 and C6-7       2. Spiculated opacities in the right upper lobe nonspecific.  This may represent   pleural-based scarring or atelectasis. Narrative:  EXAM:  CT CERVICAL SPINE WITHOUT CONTRAST       INDICATION: GLF. COMPARISON: None. CONTRAST:  None. TECHNIQUE: Multislice helical CT of the cervical spine was performed without   intravenous contrast administration. Sagittal and coronal reformats were   generated. CT dose reduction was achieved through use of a standardized   protocol tailored for this examination and automatic exposure control for dose   modulation. FINDINGS:       The alignment is within normal limits. There is no fracture or compression   deformity. The odontoid process is intact. The craniocervical junction is within   normal limits. The incidentally imaged soft tissues are within normal limits. Multilevel degenerative changes are noted with probable mild canal stenosis at   C5-6 and C6-7 as well as bilateral foraminal narrowing. Multilevel facet   arthrosis. Focal spiculated pleural-based opacities in the right upper lobe.           05/20/21 0616  CT HEAD WO CONT Final result    Impression:  No acute intracranial abnormality. Large right frontal scalp hematoma extending   inferiorly over the right orbit               Narrative:  EXAM: CT HEAD WO CONT       INDICATION: GLF       COMPARISON: None. CONTRAST: None. TECHNIQUE: Unenhanced CT of the head was performed using 5 mm images. Brain and   bone windows were generated. Coronal and sagittal reformats. CT dose reduction   was achieved through use of a standardized protocol tailored for this   examination and automatic exposure control for dose modulation. FINDINGS:   The ventricles and sulci are normal in size, shape and configuration. . There is   no significant white matter disease. There is no intracranial hemorrhage,   extra-axial collection, or mass effect. The basilar cisterns are open. No CT   evidence of acute infarct. The bone windows demonstrate no abnormalities.  The visualized portions of the   paranasal sinuses and mastoid air cells are clear. Large right frontal scalp   hematoma extending over the right orbit. 05/20/21 0616  CT MAXILLOFACIAL WO CONT Final result    Impression:  No definite fractures identified. Large right frontal scalp hematoma extending   around the right orbit. Narrative:  EXAM: CT MAXILLOFACIAL WO CONT       INDICATION: GLF       COMPARISON: None. CONTRAST:   None. TECHNIQUE:  Multislice helical CT of the facial bones was performed in the axial   plane without intravenous contrast administration. Coronal and sagittal   reformations were generated. CT dose reduction was achieved through use of a   standardized protocol tailored for this examination and automatic exposure   control for dose modulation. FINDINGS:       Bones: There is no fracture or other osseous abnormality. Paranasal sinuses: Clear. Orbits: The globes, optic nerves, and extraocular muscles are within normal   limits. .       Base of brain and soft tissues: Large right frontal scalp hematoma extending   around the right orbit. CXR Results  (Last 48 hours)               05/20/21 0909  XR CHEST PORT Final result    Impression:  No Acute Disease. Narrative:  EXAM: Portable CXR. 0904 hours. COMPARISON: 2/22/2021. INDICATION: fall       FINDINGS:   There is no acute pulmonary disease. There is a stable linear lingular region   pulmonary scar. Heart is top normal in size. There is no pulmonary edema. There is no evident   pneumothorax or pleural effusion. Medical Decision Making   I am the first provider for this patient. I reviewed the vital signs, available nursing notes, past medical history, past surgical history, family history and social history. Vital Signs-Reviewed the patient's vital signs.   Patient Vitals for the past 12 hrs:   Temp Pulse Resp BP SpO2   05/20/21 0856  78  (!) 100/46    05/20/21 0853  77  (!) 114/57    05/20/21 0850  74  (!) 112/49    05/20/21 0700  72  (!) 116/50 97 %   05/20/21 0554  86 18 (!) 111/55 98 %   05/20/21 0537 98.2 °F (36.8 °C) 78 16 (!) 105/56 99 %       Records Reviewed: Nursing records and medical records reviewed    MDM:  Facial fracture versus intracranial hemorrhages versus skull fracture versus injury to the orbit    Provider Notes (Medical Decision Making):   Patient is 59-year-old female presenting with facial trauma after losing her balance today. I do believe this is most likely secondary to her use of pregabalin. I do not believe she had a syncopal event and serial EKGs and serial troponins showed no evidence of acute coronary syndrome. Troponin was slightly elevated but did not rise. She is anemic with a hemoglobin of 7.6 which she has been as low as 9 in the past.  Her stools are brown and heme-negative. Her CT scan showed no findings of acute trauma, although there was findings of a possible subacute L1 fracture. On her exam she has no tenderness in the midline so I do suspect it is subacute. She has no signs of hemothorax on chest x-ray. She is not orthostatic and ambulates at the around the ER at her baseline per her and her . She is feeling much better after her stay in the ER and IV fluids. My plan is to follow-up with her ophthalmologist as an outpatient once her swelling goes down in her eye. No signs of orbital trauma seen on CT. She will follow-up with Dr. Dwight Ruvalcaba as well as her primary care doctor. She will hold her pregabalin. She will have her hemoglobin checked again in a week. She will return to the ER for symptoms worsen in any way. ED Course:   Initial assessment performed. The patients presenting problems have been discussed, and they are in agreement with the care plan formulated and outlined with them.   I have encouraged them to ask questions as they arise throughout their visit.    ED Course as of May 20 1116   Thu May 20, 2021   5590 Reassessed patient, all nearly four-point hemoglobin drop since blood test from 4 months ago. Will send occult blood, obtain CT scan of the abdomen pelvis to rule out occult bleeding in the abdomen, obtain chest x-ray for evaluation hemothorax, and will repeat troponin at 9 AM.  Lastly, will check orthostatics given relatively low BPs. Patient continues report that she feels fine and has no complaints. [CC]      ED Course User Index  [CC] Remy Choi MD               Critical Care:  None      Disposition:  11:16 AM  Duncan Harmon  results have been reviewed with her. She has been counseled regarding her diagnosis. She verbally conveys understanding and agreement of the signs, symptoms, diagnosis, treatment and prognosis and additionally agrees to follow up as recommended with Dr. Kelly Pablo, YOHAN in 24 - 48 hours. She also agrees with the care-plan and conveys that all of her questions have been answered. I have also put together some discharge instructions for her that include: 1) educational information regarding their diagnosis, 2) how to care for their diagnosis at home, as well a 3) list of reasons why they would want to return to the ED prior to their follow-up appointment, should their condition change. DISCHARGE PLAN:  1. Current Discharge Medication List        2. Follow-up Information     Follow up With Specialties Details Why Contact Xochilt Edmond, DO Ophthalmology In 2 days For a follow-up evaluation of URI 1 swelling is down. Felicia Pablo NP Nurse Practitioner In 2 days For a follow-up evaluation. Please hold your Coumadin dose for the next 48 hours. Please also hold the medication pregabalin you been taking which is likely causing dizziness.  [address]  [address] [phone] [facility], Reta Wayne MD Cardiology In 2 days For a follow-up evaluation. [place] [id number]  [phone]      [facility] EMERGENCY DEPT Emergency Medicine In 1 day If symptoms worsen. Please also put on antibiotic ointment/Neosporin and change dressing daily. [address] [place] [address]  [phone]        3. Return to ED if worse     Diagnosis     Clinical Impression:   1. Hematoma of frontal scalp, initial encounter    2. Anemia, unspecified type    3. Abrasion of scalp, initial encounter        Attestations:    Neva Garcia MD    Please note that this dictation was completed with [employer], the computer voice recognition software. Quite often unanticipated grammatical, syntax, homophones, and other interpretive errors are inadvertently transcribed by the computer software. Please disregard these errors. Please excuse any errors that have escaped final proofreading. Thank you.

## 2021-05-20 NOTE — ED NOTES
Pt presents to ed due to fall that occurred this morning. Pt states that she got up to use the restroom in the dark and reports she slipped and hit her head against the bed. Pt reports she takes warfarin. Pt denies cp, sob, nv,d, change in bm, urinary sx, dizziness. 6:10 AM: Pt to ct    7:26 AM: Bedside shift change report given to BRITNI Stevenson (oncoming nurse) by Haim Kerr RN (offgoing nurse). Report included the following information SBAR, Kardex, ED Summary, [facility] and Recent Results.

## 2021-05-20 NOTE — ED NOTES
Assisted pt to bedside commode and then back into bed. Placed in bed in position of comfort. Pt provided call bell and demonstrated ability to use call goins.  informed to call nurse for assistance if pt attempts to leave bed. Both side rails raised. Provided blanket for comfort    Pt reports \"seeing flowers and a table\" that aren't there.  states pt has been hallucinating and having balance issues since starting Lyrica.

## 2021-08-19 ENCOUNTER — TRANSCRIBE ORDER (OUTPATIENT)
Dept: SCHEDULING | Age: 76
End: 2021-08-19

## 2021-08-19 DIAGNOSIS — M51.34 DDD (DEGENERATIVE DISC DISEASE), THORACIC: Primary | ICD-10-CM

## 2021-08-19 DIAGNOSIS — M54.6 BILATERAL THORACIC BACK PAIN, UNSPECIFIED CHRONICITY: ICD-10-CM

## 2022-03-19 PROBLEM — Z96.651 STATUS POST RIGHT PARTIAL KNEE REPLACEMENT: Status: ACTIVE | Noted: 2020-08-17

## 2022-03-20 PROBLEM — Z98.890 S/P ABLATION OF ATRIAL FIBRILLATION: Status: ACTIVE | Noted: 2021-01-28

## 2022-03-20 PROBLEM — Z86.79 S/P ABLATION OF ATRIAL FIBRILLATION: Status: ACTIVE | Noted: 2021-01-28

## 2022-06-24 ENCOUNTER — HOSPITAL ENCOUNTER (EMERGENCY)
Age: 77
Discharge: HOME | End: 2022-06-24
Attending: EMERGENCY MEDICINE
Payer: MEDICARE

## 2022-06-24 VITALS
SYSTOLIC BLOOD PRESSURE: 149 MMHG | RESPIRATION RATE: 16 BRPM | HEIGHT: 62 IN | OXYGEN SATURATION: 99 % | DIASTOLIC BLOOD PRESSURE: 64 MMHG | BODY MASS INDEX: 25.76 KG/M2 | WEIGHT: 140 LBS | HEART RATE: 65 BPM

## 2022-06-24 DIAGNOSIS — K57.31 DIVERTICULOSIS OF LARGE INTESTINE WITH HEMORRHAGE: ICD-10-CM

## 2022-06-24 DIAGNOSIS — K92.2 LOWER GI BLEED: Primary | ICD-10-CM

## 2022-06-24 LAB
ABO + RH BLD: NORMAL
ALBUMIN SERPL-MCNC: 4.2 G/DL (ref 3.5–5)
ALBUMIN/GLOB SERPL: 1.3 {RATIO} (ref 1.1–2.2)
ALP SERPL-CCNC: 94 U/L (ref 45–117)
ALT SERPL-CCNC: 38 U/L (ref 12–78)
ANION GAP SERPL CALC-SCNC: 6 MMOL/L (ref 5–15)
AST SERPL-CCNC: 37 U/L (ref 15–37)
BASOPHILS # BLD: 0 K/UL (ref 0–0.1)
BASOPHILS NFR BLD: 1 % (ref 0–1)
BILIRUB SERPL-MCNC: 0.8 MG/DL (ref 0.2–1)
BLOOD GROUP ANTIBODIES SERPL: NORMAL
BUN SERPL-MCNC: 20 MG/DL (ref 6–20)
BUN/CREAT SERPL: 20 (ref 12–20)
CALCIUM SERPL-MCNC: 10.1 MG/DL (ref 8.5–10.1)
CHLORIDE SERPL-SCNC: 104 MMOL/L (ref 97–108)
CO2 SERPL-SCNC: 28 MMOL/L (ref 21–32)
CREAT SERPL-MCNC: 0.99 MG/DL (ref 0.55–1.02)
DIFFERENTIAL METHOD BLD: ABNORMAL
EOSINOPHIL # BLD: 0.2 K/UL (ref 0–0.4)
EOSINOPHIL NFR BLD: 4 % (ref 0–7)
ERYTHROCYTE [DISTWIDTH] IN BLOOD BY AUTOMATED COUNT: 13.3 % (ref 11.5–14.5)
GLOBULIN SER CALC-MCNC: 3.3 G/DL (ref 2–4)
GLUCOSE SERPL-MCNC: 149 MG/DL (ref 65–100)
HCT VFR BLD AUTO: 36.2 % (ref 35–47)
HGB BLD-MCNC: 11.7 G/DL (ref 11.5–16)
IMM GRANULOCYTES # BLD AUTO: 0 K/UL (ref 0–0.04)
IMM GRANULOCYTES NFR BLD AUTO: 0 % (ref 0–0.5)
INR PPP: 1 (ref 0.9–1.1)
LYMPHOCYTES # BLD: 0.9 K/UL (ref 0.8–3.5)
LYMPHOCYTES NFR BLD: 16 % (ref 12–49)
MCH RBC QN AUTO: 30.2 PG (ref 26–34)
MCHC RBC AUTO-ENTMCNC: 32.3 G/DL (ref 30–36.5)
MCV RBC AUTO: 93.3 FL (ref 80–99)
MONOCYTES # BLD: 0.6 K/UL (ref 0–1)
MONOCYTES NFR BLD: 10 % (ref 5–13)
NEUTS SEG # BLD: 3.8 K/UL (ref 1.8–8)
NEUTS SEG NFR BLD: 69 % (ref 32–75)
NRBC # BLD: 0 K/UL (ref 0–0.01)
NRBC BLD-RTO: 0 PER 100 WBC
PLATELET # BLD AUTO: 146 K/UL (ref 150–400)
PMV BLD AUTO: 9.9 FL (ref 8.9–12.9)
POTASSIUM SERPL-SCNC: 3.9 MMOL/L (ref 3.5–5.1)
PROT SERPL-MCNC: 7.5 G/DL (ref 6.4–8.2)
PROTHROMBIN TIME: 10.7 SEC (ref 9–11.1)
RBC # BLD AUTO: 3.88 M/UL (ref 3.8–5.2)
SODIUM SERPL-SCNC: 138 MMOL/L (ref 136–145)
SPECIMEN EXP DATE BLD: NORMAL
WBC # BLD AUTO: 5.5 K/UL (ref 3.6–11)

## 2022-06-24 PROCEDURE — 85025 COMPLETE CBC W/AUTO DIFF WBC: CPT

## 2022-06-24 PROCEDURE — 86900 BLOOD TYPING SEROLOGIC ABO: CPT

## 2022-06-24 PROCEDURE — 36415 COLL VENOUS BLD VENIPUNCTURE: CPT

## 2022-06-24 PROCEDURE — 85610 PROTHROMBIN TIME: CPT

## 2022-06-24 PROCEDURE — 99283 EMERGENCY DEPT VISIT LOW MDM: CPT

## 2022-06-24 PROCEDURE — 80053 COMPREHEN METABOLIC PANEL: CPT

## 2022-06-24 NOTE — ED PROVIDER NOTES
EMERGENCY DEPARTMENT HISTORY AND PHYSICAL EXAM      Date: 6/24/2022  Patient Name: Blaire Carlson  Patient Age and Sex: 76 y.o. female     History of Presenting Illness     Chief Complaint   Patient presents with    Melena     pt ambulatory into triage with cc of bright red stools x2 days, abdomen bloating       History Provided By: Patient    HPI: Blaire Carlson is a 57-year-old female with a history of gastritis, diverticulosis, hemorrhoids, presenting for bleeding per rectum. Patient states that over the past 2 days has had 4 episodes where she has noted bright red blood mixed in with her stool. States that she had a little bit of abdominal pain but then when she passed gas it got much better. No abdominal pain right now. Denies any nausea, vomiting, lightheadedness or syncope. Patient states that she is not currently on any blood thinners. Spoke with her primary care doctor about this and sent them to the ER. There are no other complaints, changes, or physical findings at this time. PCP: Shanell Spears NP    No current facility-administered medications on file prior to encounter. Current Outpatient Medications on File Prior to Encounter   Medication Sig Dispense Refill    polyethylene glycol (Miralax) 17 gram/dose powder Take 17 g by mouth daily.  [DISCONTINUED] warfarin (COUMADIN) 5 mg tablet Take 5 mg by mouth daily. Daily on Mon. Tues,Thurs, Fri, Sat, Sunday      [DISCONTINUED] warfarin (COUMADIN) 2.5 mg tablet Take 2.5 mg by mouth daily. Wednesday's only      dilTIAZem ER (CARDIZEM LA) 180 mg tablet Take 1 Tab by mouth two (2) times a day. 30 Tab 0    lidocaine 4 % patch 1 Patch by TransDERmal route every twelve (12) hours every twelve (12) hours. 10 Patch 0    omeprazole (PRILOSEC) 20 mg capsule Take 1 Cap by mouth daily.  Indications: an ulcer of the duodenum, erosive gastritis 30 Cap 5    acetaminophen (Tylenol Extra Strength) 500 mg tablet Take 1,000 mg by mouth every six (6) hours as needed for Pain.  nebivoloL (Bystolic) 2.5 mg tablet Take 2.5 mg by mouth nightly.  levalbuterol tartrate (XOPENEX) 45 mcg/actuation inhaler INHALE 1 PUFF BY MOUTH EVERY 4 HOURS AS NEEDED  3    losartan (COZAAR) 50 mg tablet Take 25 mg by mouth daily. 1/2 tablet daily      furosemide (LASIX) 20 mg tablet 1 tab every other day as needed as per cardiology (Patient taking differently: Take 40 mg by mouth daily. 40 mg daily and may take 1/2 extra tablet as needed in the afternoon for edema) 30 Tab 0    Blood-Glucose Meter monitoring kit With testing strips & lancets 1 month supply 1 Kit 0    gemfibrozil (LOPID) 600 mg tablet Take 600 mg by mouth two (2) times a day.  sitaGLIPtin (JANUVIA) 100 mg tablet Take 100 mg by mouth daily.  buPROPion SR (WELLBUTRIN SR) 150 mg SR tablet Take 150 mg by mouth two (2) times a day.  mometasone (ASMANEX TWISTHALER) 220 mcg (30 doses) inhalation capsule Take 1 Cap by inhalation two (2) times a day.  montelukast (SINGULAIR) 10 mg tablet Take 10 mg by mouth nightly.          Past History     Past Medical History:  Past Medical History:   Diagnosis Date    Arrhythmia     AFIB    Arthritis     Asthma     Atrial fibrillation ([address]     CAD (coronary artery disease)     Diabetes (HCC)     Heart failure (HCC)     Hepatitis     AGE 8     High cholesterol     Hypertension     Other ill-defined conditions(799.89)     heart palpitations    Psychiatric disorder     depression    PUD (peptic ulcer disease)     S/P ablation of atrial fibrillation 1/28/2021 1/28/2021 PVI Afib ablation    Spinal stenosis     Thromboembolus ([address]     PULMONARY EMBOLIS IN BOTH LUNGS    Vitreous detachment of left eye     BLIND IN LEFT EYE       Past Surgical History:  Past Surgical History:   Procedure Laterality Date    COLONOSCOPY N/A 4/3/2019    COLONOSCOPY performed by Josiane Ruggiero MD at [address] [facility] [facility]; HI RISK IND  4/3/2019         HX CATARACT REMOVAL Bilateral     HX HYSTERECTOMY      HX KNEE REPLACEMENT Right     unicodylar knee replacement    HX OTHER SURGICAL      foot left    HX TONSILLECTOMY      DE ABLATE L/R ATRIAL FIBRIL W/ISOLATED PULM VEIN N/A 1/28/2021    Ablation Following A-Fib  Addl performed by Agnieszka Reese MD at [facility]    DE COMPRE ELECTROPHYSIOL XM W/LEFT ATRIAL PACNG/REC N/A 1/28/2021    Lt Atrial Pace & Record During Ep Study performed by Agnieszka Reese MD at [facility]    DE EPHYS EVL TRNSPTL TX ATRIAL FIB ISOLAT PULM VEIN N/A 1/28/2021    ABLATION A-FIB  W COMPLETE EP STUDY performed by Agnieszka Reese MD at [facility]    DE ICAR CATHETER ABLATION ARRHYTHMIA ADD ON N/A 1/28/2021    Ablation Svt/Vt Add On performed by Agnieszka Reese MD at [facility]    DE INTRACARD ECHO, THER/DX INTERVENT N/A 1/28/2021    Intracardiac Echocardiogram performed by Agnieszka Reese MD at [facility]    DE INTRACARDIAC ELECTROPHYSIOLOGIC 3D MAPPING N/A 1/28/2021    Ep 3d Mapping performed by Agnieszka Reese MD at [facility]    UPPER GI ENDOSCOPY,BIOPSY  9/24/2020            Family History:  Family History   Problem Relation Age of Onset    Cancer Father     Stroke Father     Heart Disease Paternal Grandfather     Heart Failure Paternal Grandfather     Stroke Paternal Grandfather        Social History:  Social History     Tobacco Use    Smoking status: Never Smoker    Smokeless tobacco: Never Used   Substance Use Topics    Alcohol use: No    Drug use: Never       Allergies:   Allergies   Allergen Reactions    Baclofen Other (comments)     Hallucinations    Albuterol Palpitations    Bactrim [Sulfamethoxazole-Trimethoprim] Not Reported This Time    Diuretic Softgels [Pamabrom] Other (comments)     Lowers potassium, very sensitive      Iodine Unknown (comments)    Lipitor [Atorvastatin] Myalgia    Shellfish Containing Products Unknown (comments)     \"got choked, It might have been the spices\"    Tizanidine Unknown (comments)         Review of Systems   Review of Systems   Constitutional: Negative for chills and fever. Respiratory: Negative for cough and shortness of breath. Cardiovascular: Negative for chest pain. Gastrointestinal: Positive for abdominal pain and blood in stool. Negative for constipation, diarrhea, nausea and vomiting. Genitourinary: Negative for dysuria, frequency and hematuria. Neurological: Negative for weakness and numbness. All other systems reviewed and are negative. Physical Exam   Physical Exam  Vitals and nursing note reviewed. Constitutional:       Appearance: She is well-developed. HENT:      Head: Normocephalic and atraumatic. Nose: Nose normal.      Mouth/Throat:      Mouth: Mucous membranes are moist.   Eyes:      Extraocular Movements: Extraocular movements intact. Conjunctiva/sclera: Conjunctivae normal.   Cardiovascular:      Rate and Rhythm: Normal rate and regular rhythm. Pulmonary:      Effort: Pulmonary effort is normal. No respiratory distress. Breath sounds: Normal breath sounds. Abdominal:      General: There is no distension. Palpations: Abdomen is soft. Tenderness: There is no abdominal tenderness. Comments: Patient states that she is not having any abdominal pain now. Musculoskeletal:         General: Normal range of motion. Cervical back: Normal range of motion and neck supple. Skin:     General: Skin is warm and dry. Neurological:      General: No focal deficit present. Mental Status: She is alert and oriented to person, place, and time. Mental status is at baseline.    Psychiatric:         Mood and Affect: Mood normal.          Diagnostic Study Results     Labs -     Recent Results (from the past 12 hour(s))   CBC WITH AUTOMATED DIFF    Collection Time: 06/24/22  1:26 PM   Result Value Ref Range    WBC 5.5 3.6 - 11.0 K/uL    RBC 3.88 3.80 - 5.20 M/uL    HGB 11.7 11.5 - 16.0 g/dL    HCT 36.2 35.0 - 47.0 %    MCV 93.3 80.0 - 99.0 FL    MCH 30.2 26.0 - 34.0 PG    MCHC 32.3 30.0 - 36.5 g/dL    RDW 13.3 11.5 - 14.5 %    PLATELET 279 (L) 695 - 400 K/uL    MPV 9.9 8.9 - 12.9 FL    NRBC 0.0 0  WBC    ABSOLUTE NRBC 0.00 0.00 - 0.01 K/uL    NEUTROPHILS 69 32 - 75 %    LYMPHOCYTES 16 12 - 49 %    MONOCYTES 10 5 - 13 %    EOSINOPHILS 4 0 - 7 %    BASOPHILS 1 0 - 1 %    IMMATURE GRANULOCYTES 0 0.0 - 0.5 %    ABS. NEUTROPHILS 3.8 1.8 - 8.0 K/UL    ABS. LYMPHOCYTES 0.9 0.8 - 3.5 K/UL    ABS. MONOCYTES 0.6 0.0 - 1.0 K/UL    ABS. EOSINOPHILS 0.2 0.0 - 0.4 K/UL    ABS. BASOPHILS 0.0 0.0 - 0.1 K/UL    ABS. IMM. GRANS. 0.0 0.00 - 0.04 K/UL    DF AUTOMATED     METABOLIC PANEL, COMPREHENSIVE    Collection Time: 06/24/22  1:26 PM   Result Value Ref Range    Sodium 138 136 - 145 mmol/L    Potassium 3.9 3.5 - 5.1 mmol/L    Chloride 104 97 - 108 mmol/L    CO2 28 21 - 32 mmol/L    Anion gap 6 5 - 15 mmol/L    Glucose 149 (H) 65 - 100 mg/dL    BUN 20 6 - 20 MG/DL    Creatinine 0.99 0.55 - 1.02 MG/DL    BUN/Creatinine ratio 20 12 - 20      GFR est AA >60 >60 ml/min/1.73m2    GFR est non-AA 54 (L) >60 ml/min/1.73m2    Calcium 10.1 8.5 - 10.1 MG/DL    Bilirubin, total 0.8 0.2 - 1.0 MG/DL    ALT (SGPT) 38 12 - 78 U/L    AST (SGOT) 37 15 - 37 U/L    Alk.  phosphatase 94 45 - 117 U/L    Protein, total 7.5 6.4 - 8.2 g/dL    Albumin 4.2 3.5 - 5.0 g/dL    Globulin 3.3 2.0 - 4.0 g/dL    A-G Ratio 1.3 1.1 - 2.2     TYPE & SCREEN    Collection Time: 06/24/22  1:26 PM   Result Value Ref Range    Crossmatch Expiration 06/27/2022,2359     ABO/Rh(D) A POSITIVE     Antibody screen NEG    PROTHROMBIN TIME + INR    Collection Time: 06/24/22  1:26 PM   Result Value Ref Range    INR 1.0 0.9 - 1.1      Prothrombin time 10.7 9.0 - 11.1 sec       Radiologic Studies -   No orders to display     CT Results  (Last 48 hours)    None        CXR Results  (Last 48 hours)    None            Medical Decision Making   I am the first provider for this patient. I reviewed the vital signs, available nursing notes, past medical history, past surgical history, family history and social history. Vital Signs-Reviewed the patient's vital signs. Patient Vitals for the past 12 hrs:   Pulse Resp BP SpO2   06/24/22 1232 65 16 (!) 149/64 99 %       Records Reviewed: Nursing Notes and Old Medical Records    Provider Notes (Medical Decision Making):   Patient presents with lower GI bleeding. DDx: AVM, diverticulosis, diverticulitis, IBD, IBS, colitis, hemorrhoids. Will get labs, T+S, keep on monitor and give fluids to start if prior EF% allows. Will also get orthostatics PRN. ED Course:   Initial assessment performed. The patients presenting problems have been discussed, and they are in agreement with the care plan formulated and outlined with them. I have encouraged them to ask questions as they arise throughout their visit. ED Course as of 06/24/22 1633   Fri Jun 24, 2022   1632 His blood levels came back better than her baseline. Discussed this with her. She already has a GI doctor and can follow-up with them next week. Has blood mixed in with her stool and normal vitals so patient will be safe to go home and follow-up with Dr. Sanaz Espana. She wants to go home. [JS]      ED Course User Index  [JS] Severa Hemp, MD     Critical Care Time:   0    Disposition:  Discharge Note:  The patient has been re-evaluated and is ready for discharge. Reviewed available results with patient. Counseled patient on diagnosis and care plan. Patient has expressed understanding, and all questions have been answered. Patient agrees with plan and agrees to follow up as recommended, or to return to the ED if their symptoms worsen. Discharge instructions have been provided and explained to the patient, along with reasons to return to the ED.       PLAN:  Current Discharge Medication List        2. Follow-up Information     Follow up With Specialties Details Why Contact Info    Fabrizio Gonsalves MD Gastroenterology Schedule an appointment as soon as possible for a visit   [address]  [address] [place] [address] [phone]          3. Return to ED if worse     Diagnosis     Clinical Impression:   1. Lower GI bleed    2. Diverticulosis of large intestine with hemorrhage        Attestations:    Donita Sumner M.D. Please note that this dictation was completed with [employer], the computer voice recognition software. Quite often unanticipated grammatical, syntax, homophones, and other interpretive errors are inadvertently transcribed by the computer software. Please disregard these errors. Please excuse any errors that have escaped final proofreading. Thank you.

## 2023-04-25 ENCOUNTER — HOSPITAL ENCOUNTER (INPATIENT)
Age: 78
LOS: 4 days | Discharge: HOME | End: 2023-04-29
Attending: EMERGENCY MEDICINE | Admitting: INTERNAL MEDICINE
Payer: MEDICARE

## 2023-04-25 ENCOUNTER — APPOINTMENT (OUTPATIENT)
Dept: CT IMAGING | Age: 78
End: 2023-04-25
Attending: EMERGENCY MEDICINE
Payer: MEDICARE

## 2023-04-25 DIAGNOSIS — R00.1 BRADYCARDIA: Primary | ICD-10-CM

## 2023-04-25 DIAGNOSIS — E86.0 DEHYDRATION: ICD-10-CM

## 2023-04-25 LAB
ALBUMIN SERPL-MCNC: 3.4 G/DL (ref 3.5–5)
ALBUMIN/GLOB SERPL: 1.3 (ref 1.1–2.2)
ALP SERPL-CCNC: 62 U/L (ref 45–117)
ALT SERPL-CCNC: 23 U/L (ref 12–78)
ANION GAP SERPL CALC-SCNC: 4 MMOL/L (ref 5–15)
APPEARANCE UR: CLEAR
AST SERPL-CCNC: 22 U/L (ref 15–37)
BACTERIA URNS QL MICRO: NEGATIVE /HPF
BASOPHILS # BLD: 0.1 K/UL (ref 0–0.1)
BASOPHILS NFR BLD: 1 % (ref 0–1)
BILIRUB SERPL-MCNC: 1.2 MG/DL (ref 0.2–1)
BILIRUB UR QL: NEGATIVE
BUN SERPL-MCNC: 28 MG/DL (ref 6–20)
BUN/CREAT SERPL: 19 (ref 12–20)
CALCIUM SERPL-MCNC: 8.4 MG/DL (ref 8.5–10.1)
CHLORIDE SERPL-SCNC: 109 MMOL/L (ref 97–108)
CO2 SERPL-SCNC: 26 MMOL/L (ref 21–32)
COLOR UR: ABNORMAL
CREAT SERPL-MCNC: 1.48 MG/DL (ref 0.55–1.02)
DIFFERENTIAL METHOD BLD: ABNORMAL
EOSINOPHIL # BLD: 0.1 K/UL (ref 0–0.4)
EOSINOPHIL NFR BLD: 2 % (ref 0–7)
EPITH CASTS URNS QL MICRO: ABNORMAL /LPF
ERYTHROCYTE [DISTWIDTH] IN BLOOD BY AUTOMATED COUNT: 13.6 % (ref 11.5–14.5)
GLOBULIN SER CALC-MCNC: 2.7 G/DL (ref 2–4)
GLUCOSE BLD STRIP.AUTO-MCNC: 111 MG/DL (ref 65–117)
GLUCOSE SERPL-MCNC: 226 MG/DL (ref 65–100)
GLUCOSE UR STRIP.AUTO-MCNC: NEGATIVE MG/DL
HCT VFR BLD AUTO: 33.3 % (ref 35–47)
HGB BLD-MCNC: 10.8 G/DL (ref 11.5–16)
HGB UR QL STRIP: NEGATIVE
HYALINE CASTS URNS QL MICRO: ABNORMAL /LPF (ref 0–5)
IMM GRANULOCYTES # BLD AUTO: 0.1 K/UL (ref 0–0.04)
IMM GRANULOCYTES NFR BLD AUTO: 1 % (ref 0–0.5)
KETONES UR QL STRIP.AUTO: NEGATIVE MG/DL
LACTATE BLD-SCNC: 0.82 MMOL/L (ref 0.4–2)
LEUKOCYTE ESTERASE UR QL STRIP.AUTO: NEGATIVE
LIPASE SERPL-CCNC: 208 U/L (ref 73–393)
LYMPHOCYTES # BLD: 0.6 K/UL (ref 0.8–3.5)
LYMPHOCYTES NFR BLD: 10 % (ref 12–49)
MCH RBC QN AUTO: 31.5 PG (ref 26–34)
MCHC RBC AUTO-ENTMCNC: 32.4 G/DL (ref 30–36.5)
MCV RBC AUTO: 97.1 FL (ref 80–99)
MONOCYTES # BLD: 0.5 K/UL (ref 0–1)
MONOCYTES NFR BLD: 8 % (ref 5–13)
NEUTS SEG # BLD: 4.9 K/UL (ref 1.8–8)
NEUTS SEG NFR BLD: 78 % (ref 32–75)
NITRITE UR QL STRIP.AUTO: NEGATIVE
NRBC # BLD: 0 K/UL (ref 0–0.01)
NRBC BLD-RTO: 0 PER 100 WBC
PH UR STRIP: 6 (ref 5–8)
PLATELET # BLD AUTO: 128 K/UL (ref 150–400)
PMV BLD AUTO: 10.6 FL (ref 8.9–12.9)
POTASSIUM SERPL-SCNC: 4.2 MMOL/L (ref 3.5–5.1)
PROT SERPL-MCNC: 6.1 G/DL (ref 6.4–8.2)
PROT UR STRIP-MCNC: ABNORMAL MG/DL
RBC # BLD AUTO: 3.43 M/UL (ref 3.8–5.2)
RBC #/AREA URNS HPF: ABNORMAL /HPF (ref 0–5)
RBC MORPH BLD: ABNORMAL
SERVICE CMNT-IMP: NORMAL
SODIUM SERPL-SCNC: 139 MMOL/L (ref 136–145)
SP GR UR REFRACTOMETRY: 1.01
UA: UC IF INDICATED,UAUC: ABNORMAL
UROBILINOGEN UR QL STRIP.AUTO: 0.2 EU/DL (ref 0.2–1)
WBC # BLD AUTO: 6.3 K/UL (ref 3.6–11)
WBC URNS QL MICRO: ABNORMAL /HPF (ref 0–4)

## 2023-04-25 PROCEDURE — 82962 GLUCOSE BLOOD TEST: CPT

## 2023-04-25 PROCEDURE — 36415 COLL VENOUS BLD VENIPUNCTURE: CPT

## 2023-04-25 PROCEDURE — 74011250636 HC RX REV CODE- 250/636: Performed by: EMERGENCY MEDICINE

## 2023-04-25 PROCEDURE — 74011000250 HC RX REV CODE- 250: Performed by: INTERNAL MEDICINE

## 2023-04-25 PROCEDURE — 81001 URINALYSIS AUTO W/SCOPE: CPT

## 2023-04-25 PROCEDURE — 83690 ASSAY OF LIPASE: CPT

## 2023-04-25 PROCEDURE — 65660000001 HC RM ICU INTERMED STEPDOWN

## 2023-04-25 PROCEDURE — 99285 EMERGENCY DEPT VISIT HI MDM: CPT

## 2023-04-25 PROCEDURE — 83605 ASSAY OF LACTIC ACID: CPT

## 2023-04-25 PROCEDURE — 74011250636 HC RX REV CODE- 250/636: Performed by: INTERNAL MEDICINE

## 2023-04-25 PROCEDURE — 74176 CT ABD & PELVIS W/O CONTRAST: CPT

## 2023-04-25 PROCEDURE — 80053 COMPREHEN METABOLIC PANEL: CPT

## 2023-04-25 PROCEDURE — 93005 ELECTROCARDIOGRAM TRACING: CPT

## 2023-04-25 PROCEDURE — 85025 COMPLETE CBC W/AUTO DIFF WBC: CPT

## 2023-04-25 PROCEDURE — 96374 THER/PROPH/DIAG INJ IV PUSH: CPT

## 2023-04-25 RX ORDER — ATROPINE SULFATE 1 MG/ML
1 INJECTION, SOLUTION INTRAVENOUS AS NEEDED
Status: DISCONTINUED | OUTPATIENT
Start: 2023-04-25 | End: 2023-04-29 | Stop reason: HOSPADM

## 2023-04-25 RX ORDER — LEVALBUTEROL INHALATION SOLUTION 0.63 MG/3ML
0.63 SOLUTION RESPIRATORY (INHALATION)
COMMUNITY

## 2023-04-25 RX ORDER — SODIUM CHLORIDE 0.9 % (FLUSH) 0.9 %
5-40 SYRINGE (ML) INJECTION AS NEEDED
Status: DISCONTINUED | OUTPATIENT
Start: 2023-04-25 | End: 2023-04-29 | Stop reason: HOSPADM

## 2023-04-25 RX ORDER — PROMETHAZINE HYDROCHLORIDE 25 MG/1
12.5 TABLET ORAL
Status: DISCONTINUED | OUTPATIENT
Start: 2023-04-25 | End: 2023-04-29 | Stop reason: HOSPADM

## 2023-04-25 RX ORDER — ACETAMINOPHEN 650 MG/1
650 SUPPOSITORY RECTAL
Status: DISCONTINUED | OUTPATIENT
Start: 2023-04-25 | End: 2023-04-29 | Stop reason: HOSPADM

## 2023-04-25 RX ORDER — LANOLIN ALCOHOL/MO/W.PET/CERES
500 CREAM (GRAM) TOPICAL DAILY
Status: DISCONTINUED | OUTPATIENT
Start: 2023-04-26 | End: 2023-04-29 | Stop reason: HOSPADM

## 2023-04-25 RX ORDER — ATROPINE SULFATE 1 MG/ML
1 INJECTION, SOLUTION INTRAVENOUS ONCE
Status: DISCONTINUED | OUTPATIENT
Start: 2023-04-25 | End: 2023-04-25

## 2023-04-25 RX ORDER — DEXTROSE MONOHYDRATE 100 MG/ML
0-250 INJECTION, SOLUTION INTRAVENOUS AS NEEDED
Status: DISCONTINUED | OUTPATIENT
Start: 2023-04-25 | End: 2023-04-29 | Stop reason: HOSPADM

## 2023-04-25 RX ORDER — ONDANSETRON 2 MG/ML
4 INJECTION INTRAMUSCULAR; INTRAVENOUS
Status: DISCONTINUED | OUTPATIENT
Start: 2023-04-25 | End: 2023-04-29 | Stop reason: HOSPADM

## 2023-04-25 RX ORDER — HEPARIN SODIUM 5000 [USP'U]/ML
5000 INJECTION, SOLUTION INTRAVENOUS; SUBCUTANEOUS EVERY 8 HOURS
Status: DISCONTINUED | OUTPATIENT
Start: 2023-04-25 | End: 2023-04-27

## 2023-04-25 RX ORDER — GUAIFENESIN 100 MG/5ML
81 LIQUID (ML) ORAL DAILY
Status: DISCONTINUED | OUTPATIENT
Start: 2023-04-26 | End: 2023-04-27

## 2023-04-25 RX ORDER — BUPROPION HYDROCHLORIDE 150 MG/1
150 TABLET, EXTENDED RELEASE ORAL 2 TIMES DAILY
Status: DISCONTINUED | OUTPATIENT
Start: 2023-04-25 | End: 2023-04-29 | Stop reason: HOSPADM

## 2023-04-25 RX ORDER — ATROPINE SULFATE 1 MG/ML
1 INJECTION, SOLUTION INTRAVENOUS AS NEEDED
Status: DISCONTINUED | OUTPATIENT
Start: 2023-04-25 | End: 2023-04-25 | Stop reason: SDUPTHER

## 2023-04-25 RX ORDER — ACETAMINOPHEN 325 MG/1
650 TABLET ORAL
Status: DISCONTINUED | OUTPATIENT
Start: 2023-04-25 | End: 2023-04-29 | Stop reason: HOSPADM

## 2023-04-25 RX ORDER — SODIUM CHLORIDE 0.9 % (FLUSH) 0.9 %
5-40 SYRINGE (ML) INJECTION EVERY 8 HOURS
Status: DISCONTINUED | OUTPATIENT
Start: 2023-04-25 | End: 2023-04-29 | Stop reason: HOSPADM

## 2023-04-25 RX ORDER — HYDRALAZINE HYDROCHLORIDE 20 MG/ML
20 INJECTION INTRAMUSCULAR; INTRAVENOUS
Status: DISCONTINUED | OUTPATIENT
Start: 2023-04-25 | End: 2023-04-29 | Stop reason: HOSPADM

## 2023-04-25 RX ORDER — LEVALBUTEROL INHALATION SOLUTION 0.63 MG/3ML
0.63 SOLUTION RESPIRATORY (INHALATION)
Status: DISCONTINUED | OUTPATIENT
Start: 2023-04-25 | End: 2023-04-29 | Stop reason: HOSPADM

## 2023-04-25 RX ORDER — BISACODYL 5 MG
10 TABLET, DELAYED RELEASE (ENTERIC COATED) ORAL DAILY PRN
Status: DISCONTINUED | OUTPATIENT
Start: 2023-04-25 | End: 2023-04-29 | Stop reason: HOSPADM

## 2023-04-25 RX ORDER — SODIUM CHLORIDE 9 MG/ML
50 INJECTION, SOLUTION INTRAVENOUS CONTINUOUS
Status: DISCONTINUED | OUTPATIENT
Start: 2023-04-25 | End: 2023-04-29 | Stop reason: HOSPADM

## 2023-04-25 RX ORDER — INSULIN LISPRO 100 [IU]/ML
INJECTION, SOLUTION INTRAVENOUS; SUBCUTANEOUS
Status: DISCONTINUED | OUTPATIENT
Start: 2023-04-25 | End: 2023-04-29 | Stop reason: HOSPADM

## 2023-04-25 RX ORDER — HEPARIN SODIUM 5000 [USP'U]/ML
5000 INJECTION, SOLUTION INTRAVENOUS; SUBCUTANEOUS EVERY 8 HOURS
Status: DISCONTINUED | OUTPATIENT
Start: 2023-04-26 | End: 2023-04-25

## 2023-04-25 RX ORDER — ATROPINE SULFATE 0.1 MG/ML
1 INJECTION INTRAVENOUS ONCE
Status: COMPLETED | OUTPATIENT
Start: 2023-04-25 | End: 2023-04-25

## 2023-04-25 RX ORDER — IBUPROFEN 200 MG
4 TABLET ORAL AS NEEDED
Status: DISCONTINUED | OUTPATIENT
Start: 2023-04-25 | End: 2023-04-29 | Stop reason: HOSPADM

## 2023-04-25 RX ADMIN — SODIUM CHLORIDE, PRESERVATIVE FREE 10 ML: 5 INJECTION INTRAVENOUS at 20:26

## 2023-04-25 RX ADMIN — HEPARIN SODIUM 5000 UNITS: 5000 INJECTION INTRAVENOUS; SUBCUTANEOUS at 21:13

## 2023-04-25 RX ADMIN — ATROPINE SULFATE 1 MG: 0.1 INJECTION, SOLUTION INTRAVENOUS at 14:23

## 2023-04-25 RX ADMIN — SODIUM CHLORIDE 75 ML/HR: 9 INJECTION, SOLUTION INTRAVENOUS at 20:25

## 2023-04-25 RX ADMIN — SODIUM CHLORIDE 1000 ML: 9 INJECTION, SOLUTION INTRAVENOUS at 12:31

## 2023-04-25 NOTE — ED PROVIDER NOTES
[facility] EMERGENCY DEPT  EMERGENCY DEPARTMENT ENCOUNTER       Pt Name: Jeffery Shaver  MRN: [id number]  [place] 1945  Date of evaluation: 4/25/2023  Provider: Meg Hernandez MD   PCP: Jaky Michelle NP  Note Started: 2:58 PM 4/25/23     CHIEF COMPLAINT       Chief Complaint   Patient presents with    Diarrhea    Hypotension     Patient came in due to diarrhea started last night   Has a history of Afib, CHF, DM    took blood pressure and it low, Ems was called   Started a bolus of Nacl         HISTORY OF PRESENT ILLNESS: 1 or more elements      History From: patient, ems History limited by: none     Jeffery Shaver is a [de-identified] y.o. female who presents with a cc of N/V/D and abdominal pain. Please See MDM for Additional Details of the HPI/PMH  Nursing Notes were all reviewed and agreed with or any disagreements were addressed in the HPI. REVIEW OF SYSTEMS        Positives and Pertinent negatives as per HPI.     PAST HISTORY     Past Medical History:  Past Medical History:   Diagnosis Date    Arrhythmia     AFIB    Arthritis     Asthma     Atrial fibrillation (HCC)     CAD (coronary artery disease)     Diabetes (HCC)     Heart failure (HCC)     Hepatitis     AGE 8     High cholesterol     Hypertension     Other ill-defined conditions(799.89)     heart palpitations    Psychiatric disorder     depression    PUD (peptic ulcer disease)     S/P ablation of atrial fibrillation 1/28/2021 1/28/2021 PVI Afib ablation    Spinal stenosis     Thromboembolus (HCC)     PULMONARY EMBOLIS IN BOTH LUNGS    Vitreous detachment of left eye     BLIND IN LEFT EYE       Past Surgical History:  Past Surgical History:   Procedure Laterality Date    COLONOSCOPY N/A 4/3/2019    COLONOSCOPY performed by Deni Hedrick., MD at [address]; HI RISK IND  4/3/2019         HX CATARACT REMOVAL Bilateral     HX HYSTERECTOMY      HX KNEE REPLACEMENT Right     unicodylar knee replacement HX OTHER SURGICAL      foot left    HX TONSILLECTOMY      AL ABLATE L/R ATRIAL FIBRIL W/ISOLATED PULM VEIN N/A 1/28/2021    Ablation Following A-Fib  Addl performed by Harjit Rene MD at [facility]ATH [facility]    AL COMPRE ELECTROPHYSIOL XM W/LEFT ATRIAL PACNG/REC N/A 1/28/2021    Lt Atrial Pace & Record During Ep Study performed by Harjit Rene MD at [facility]    [address] FIB PULM VEIN ISOLATION N/A 1/28/2021    ABLATION A-FIB  W COMPLETE EP STUDY performed by Harjit Rene MD at [facility]ATH [facility]    AL ICAR CATHETER ABLATION ARRHYTHMIA ADD ON N/A 1/28/2021    Ablation Svt/Vt Add On performed by Harjit Rene MD at [facility]ATH [facility]    AL INTRACARD [facility] W/THER/DX IVNTJ INCL IMG S&I N/A 1/28/2021    Intracardiac Echocardiogram performed by Harjit Rene MD at [facility]ATH [facility]    AL INTRACARDIAC ELECTROPHYSIOLOGIC 3D MAPPING N/A 1/28/2021    Ep 3d Mapping performed by Harjit Rene MD at [facility]    UPPER GI ENDOSCOPY,BIOPSY  9/24/2020            Family History:  Family History   Problem Relation Age of Onset    Cancer Father     Stroke Father     Heart Disease Paternal Grandfather     Heart Failure Paternal Grandfather     Stroke Paternal Grandfather        Social History:  Social History     Tobacco Use    Smoking status: Never    Smokeless tobacco: Never   Substance Use Topics    Alcohol use: No    Drug use: Never       Allergies:   Allergies   Allergen Reactions    Baclofen Other (comments)     Hallucinations    Albuterol Palpitations    Bactrim [Sulfamethoxazole-Trimethoprim] Not Reported This Time    Diuretic Softgels [Pamabrom] Other (comments)     Lowers potassium, very sensitive      Iodine Unknown (comments)    Lipitor [Atorvastatin] Myalgia    Shellfish Containing Products Unknown (comments)     \"got choked, It might have been the spices\"    Tizanidine Unknown (comments)       CURRENT MEDICATIONS      Previous Medications    ACETAMINOPHEN (TYLENOL EXTRA STRENGTH) 500 MG TABLET    Take 1,000 mg by mouth every six (6) hours as needed for Pain. BLOOD-GLUCOSE METER MONITORING KIT    With testing strips & lancets 1 month supply    BUPROPION SR (WELLBUTRIN SR) 150 MG SR TABLET    Take 150 mg by mouth two (2) times a day. DILTIAZEM ER (CARDIZEM LA) 180 MG TABLET    Take 1 Tab by mouth two (2) times a day. FUROSEMIDE (LASIX) 20 MG TABLET    1 tab every other day as needed as per cardiology    GEMFIBROZIL (LOPID) 600 MG TABLET    Take 600 mg by mouth two (2) times a day. LEVALBUTEROL (XOPENEX) 0.63 MG/3 ML NEBU    3 mL by Nebulization route every four (4) hours as needed. LEVALBUTEROL TARTRATE (XOPENEX) 45 MCG/ACTUATION INHALER    INHALE 1 PUFF BY MOUTH EVERY 4 HOURS AS NEEDED    LIDOCAINE 4 % PATCH    1 Patch by TransDERmal route every twelve (12) hours every twelve (12) hours. LOSARTAN (COZAAR) 50 MG TABLET    Take 25 mg by mouth daily. 1/2 tablet daily    MOMETASONE (ASMANEX TWISTHALER) 220 MCG (30 DOSES) INHALATION CAPSULE    Take 1 Cap by inhalation two (2) times a day. MONTELUKAST (SINGULAIR) 10 MG TABLET    Take 10 mg by mouth nightly. NEBIVOLOL (BYSTOLIC) 2.5 MG TABLET    Take 2.5 mg by mouth nightly. OMEPRAZOLE (PRILOSEC) 20 MG CAPSULE    Take 1 Cap by mouth daily. Indications: an ulcer of the duodenum, erosive gastritis    POLYETHYLENE GLYCOL (MIRALAX) 17 GRAM/DOSE POWDER    Take 17 g by mouth daily. SITAGLIPTIN (JANUVIA) 100 MG TABLET    Take 100 mg by mouth daily. SCREENINGS               No data recorded         PHYSICAL EXAM      ED Triage Vitals [04/25/23 1224]   ED Encounter Vitals Group      BP (!) 103/41      Pulse (Heart Rate) (!) 37      Resp Rate 14      Temp 98.2 °F (36.8 °C)      Temp src       O2 Sat (%) 97 %      Weight 150 lb      Height 5' 4\"        Physical Exam  Vitals and nursing note reviewed. Constitutional:       General: She is not in acute distress. Appearance: She is well-developed. HENT:      Head: Normocephalic and atraumatic. Mouth/Throat:      Mouth: Mucous membranes are dry. Eyes:      Conjunctiva/sclera: Conjunctivae normal.      Pupils: Pupils are equal, round, and reactive to light. Cardiovascular:      Rate and Rhythm: Regular rhythm. Bradycardia present. Pulmonary:      Effort: Pulmonary effort is normal. No respiratory distress. Breath sounds: Normal breath sounds. No stridor. Abdominal:      General: There is no distension. Palpations: Abdomen is soft. Tenderness: There is no abdominal tenderness. Musculoskeletal:         General: Normal range of motion. Cervical back: Normal range of motion. Skin:     General: Skin is warm and dry. Neurological:      Mental Status: She is alert and oriented to person, place, and time. Psychiatric:         Mood and Affect: Mood normal.        DIAGNOSTIC RESULTS   LABS:     Recent Results (from the past 12 hour(s))   CBC WITH AUTOMATED DIFF    Collection Time: 04/25/23 12:32 PM   Result Value Ref Range    WBC 6.3 3.6 - 11.0 K/uL    RBC 3.43 (L) 3.80 - 5.20 M/uL    HGB 10.8 (L) 11.5 - 16.0 g/dL    HCT 33.3 (L) 35.0 - 47.0 %    MCV 97.1 80.0 - 99.0 FL    MCH 31.5 26.0 - 34.0 PG    MCHC 32.4 30.0 - 36.5 g/dL    RDW 13.6 11.5 - 14.5 %    PLATELET 972 (L) 087 - 400 K/uL    MPV 10.6 8.9 - 12.9 FL    NRBC 0.0 0  WBC    ABSOLUTE NRBC 0.00 0.00 - 0.01 K/uL    NEUTROPHILS 78 (H) 32 - 75 %    LYMPHOCYTES 10 (L) 12 - 49 %    MONOCYTES 8 5 - 13 %    EOSINOPHILS 2 0 - 7 %    BASOPHILS 1 0 - 1 %    IMMATURE GRANULOCYTES 1 (H) 0.0 - 0.5 %    ABS. NEUTROPHILS 4.9 1.8 - 8.0 K/UL    ABS. LYMPHOCYTES 0.6 (L) 0.8 - 3.5 K/UL    ABS. MONOCYTES 0.5 0.0 - 1.0 K/UL    ABS. EOSINOPHILS 0.1 0.0 - 0.4 K/UL    ABS. BASOPHILS 0.1 0.0 - 0.1 K/UL    ABS. IMM.  GRANS. 0.1 (H) 0.00 - 0.04 K/UL    DF SMEAR SCANNED      RBC COMMENTS NORMOCYTIC, NORMOCHROMIC     METABOLIC PANEL, COMPREHENSIVE Collection Time: 04/25/23 12:32 PM   Result Value Ref Range    Sodium 139 136 - 145 mmol/L    Potassium 4.2 3.5 - 5.1 mmol/L    Chloride 109 (H) 97 - 108 mmol/L    CO2 26 21 - 32 mmol/L    Anion gap 4 (L) 5 - 15 mmol/L    Glucose 226 (H) 65 - 100 mg/dL    BUN 28 (H) 6 - 20 MG/DL    Creatinine 1.48 (H) 0.55 - 1.02 MG/DL    BUN/Creatinine ratio 19 12 - 20      eGFR 36 (L) >60 ml/min/1.73m2    Calcium 8.4 (L) 8.5 - 10.1 MG/DL    Bilirubin, total 1.2 (H) 0.2 - 1.0 MG/DL    ALT (SGPT) 23 12 - 78 U/L    AST (SGOT) 22 15 - 37 U/L    Alk. phosphatase 62 45 - 117 U/L    Protein, total 6.1 (L) 6.4 - 8.2 g/dL    Albumin 3.4 (L) 3.5 - 5.0 g/dL    Globulin 2.7 2.0 - 4.0 g/dL    A-G Ratio 1.3 1.1 - 2.2     LIPASE    Collection Time: 04/25/23 12:32 PM   Result Value Ref Range    Lipase 208 73 - 393 U/L   EKG, 12 LEAD, INITIAL    Collection Time: 04/25/23 12:35 PM   Result Value Ref Range    Ventricular Rate 34 BPM    Atrial Rate 34 BPM    QRS Duration 84 ms    Q-T Interval 570 ms    QTC Calculation (Bezet) 428 ms    Calculated R Axis 132 degrees    Calculated T Axis 44 degrees    Diagnosis       Junctional bradycardia  Right axis deviation  Low voltage QRS  Cannot rule out Anteroseptal infarct (cited on or before 22-FEB-2021)  When compared with ECG of 20-MAY-2021 07:10,  Junctional rhythm has replaced Atrial fibrillation  Vent. rate has decreased BY  37 BPM  Questionable change in initial forces of Anterior leads  T wave inversion now evident in Anterior leads     POC LACTIC ACID    Collection Time: 04/25/23 12:46 PM   Result Value Ref Range    Lactic Acid (POC) 0.82 0.40 - 2.00 mmol/L        EKG: If performed, independent interpretation documented below in the MDM section     RADIOLOGY:  Non-plain film images such as CT, Ultrasound and MRI are read by the radiologist. Plain radiographic images are visualized and preliminarily interpreted by the ED Provider with the findings documented in the MDM section.      Interpretation per the Radiologist below, if available at the time of this note:     CT ABD PELV WO CONT    Result Date: 4/25/2023  EXAM: CT ABD PELV WO CONT INDICATION: abd pain, n/v/d COMPARISON: 5/20/2021 IV CONTRAST: None. ORAL CONTRAST: None TECHNIQUE: Thin axial images were obtained through the abdomen and pelvis. Coronal and sagittal reformats were generated. CT dose reduction was achieved through use of a standardized protocol tailored for this examination and automatic exposure control for dose modulation. The absence of intravenous contrast material reduces the sensitivity for evaluation of the vasculature and solid organs. FINDINGS: LOWER THORAX: No significant abnormality in the incidentally imaged lower chest. LIVER: Cirrhotic morphology of the liver is noted without convincing mass lesion given the lack of intravenous contrast. BILIARY TREE: Gallbladder is within normal limits. CBD is not dilated. SPLEEN: within normal limits. PANCREAS: No focal abnormality. ADRENALS: Unremarkable. KIDNEYS/URETERS: No calculus or hydronephrosis. STOMACH: Unremarkable. SMALL BOWEL: No dilatation or wall thickening. COLON: No dilatation or wall thickening. APPENDIX: Within normal limits. PERITONEUM: No ascites or pneumoperitoneum. RETROPERITONEUM: No lymphadenopathy or aortic aneurysm. REPRODUCTIVE ORGANS: The uterus is surgically absent. URINARY BLADDER: No mass or calculus. BONES: Degenerative changes are seen in the lumbar spine. Chronic compression fractures are noted at L1 and L2. ABDOMINAL WALL: No mass or hernia. ADDITIONAL COMMENTS: N/A     Cirrhotic morphology of the liver. No acute abnormality.        PROCEDURES   Unless otherwise noted below, none  EKG    Date/Time: 4/25/2023 2:59 PM  Performed by: Edouard Posada MD  Authorized by: Edouard Posada MD     ECG reviewed by ED Physician in the absence of a cardiologist: yes    Interpretation:     Interpretation: non-specific    Rate:     ECG rate:  34    ECG rate assessment: bradycardic    Rhythm:     Rhythm: junctional    QRS:     QRS axis:  Normal    QRS intervals:  Normal    QRS conduction: normal    ST segments:     ST segments:  Normal  T waves:     T waves: normal    Critical Care  Performed by: China Bustos MD  Authorized by: China Bustos MD     Critical care provider statement:     Critical care time (minutes):  40    Critical care time was exclusive of:  Separately billable procedures and treating other patients    Critical care was necessary to treat or prevent imminent or life-threatening deterioration of the following conditions:  Cardiac failure    Critical care was time spent personally by me on the following activities:  Ordering and performing treatments and interventions, ordering and review of laboratory studies, ordering and review of radiographic studies, pulse oximetry, re-evaluation of patient's condition, review of old charts, examination of patient, evaluation of patient's response to treatment and discussions with consultants    Care discussed with: admitting provider       CRITICAL CARE TIME   above    [address] [facility] and DIFFERENTIAL DIAGNOSIS/MDM   Vitals:    Vitals:    04/25/23 1254 04/25/23 1354 04/25/23 1423 04/25/23 1427   BP: (!) 116/52 (!) 113/45 (!) 103/41 (!) 130/51   Pulse: (!) 33 (!) 35 (!) 36 63   Resp: 17 15  20   Temp:       SpO2: 95% 98%  98%   Weight:       Height:            Patient was given the following medications:  Medications   sodium chloride 0.9 % bolus infusion 1,000 mL (1,000 mL IntraVENous New Bag 4/25/23 1231)   atropine injection 1 mg (1 mg IntraVENous Given 4/25/23 1423)       Medical Decision Making  78-year-old female with history of A-fib, status post ablation, CAD, diabetes, CHF, hypertension, hyperlipidemia, who presents with a chief complaint of nausea, vomiting, diarrhea, abdominal pain, and abnormal vital signs.   Patient states she began to feel generally unwell last night with nausea, vomiting, and diarrhea. She also reports severe cramping abdominal pain that has improved. This morning she was still not feeling well and her  checked her blood pressure noted to be low in the 22D systolic. He also noted her heart rate to be lower than normal for her. Patient states that overall her belly symptoms have improved. She denies any chest pain or shortness of breath. EMS reports on arrival her blood pressure was in the 68Y systolic and they gave 459 cc of fluid prior to her arrival.  Patient does not take any anticoagulation. She does admit to UTI about 3 weeks ago for which she received antibiotics but does not have any current urinary symptoms. On exam she is bradycardic to the 30s on presentation with an EKG showing a junctional rhythm. Blood pressures in the 577G systolic. She does appear clinically dry with dry mucous membranes. She has no significant abdominal tenderness to palpation. Differential includes electrolyte derangements, anemia, gastroenteritis, viral illness, UTI. Will check basic lab work to rule out severe electrolyte derangements or anemia. Will check urinalysis to rule out UTI  Will check CT abdomen to rule out acute intraabdominal process      Problems Addressed:  Bradycardia: acute illness or injury  Dehydration: acute illness or injury    Amount and/or Complexity of Data Reviewed  Labs: ordered. Decision-making details documented in ED Course. Radiology: ordered. Decision-making details documented in ED Course. ECG/medicine tests: ordered and independent interpretation performed. Decision-making details documented in ED Course. Risk  Prescription drug management. Decision regarding hospitalization. Lab work with a mildly elevated creatinine to 1.4, suspect secondary to dehydration. Patient continued to be bradycardic without any appreciable symptoms, no dizziness, sitting up in bed talking.   She did get a dose of 1 mg of atropine with improved heart rate and blood pressure. CT was obtained and does not show any acute intra-abdominal process. Discussed with Dr. Livia Burk, hospitalist, for admission. Requested cardiology consult which I have placed      ED Course as of 04/25/23 1511   Tue Apr 25, 2023   1429 Improved HR with atropine [BILLIE]      ED Course User Index  Liborio Conway MD         FINAL IMPRESSION     1. Bradycardia    2. Dehydration          DISPOSITION/PLAN       CLINICAL IMPRESSION    Admit Note: Pt is being admitted by Dr. Livia Burk. The results of their tests and reason(s) for their admission have been discussed with pt and/or available family. They convey agreement and understanding for the need to be admitted and for the admission diagnosis. I am the Primary Clinician of Record. Jing Tobar MD (electronically signed)    (Please note that parts of this dictation were completed with voice recognition software. Quite often unanticipated grammatical, syntax, homophones, and other interpretive errors are inadvertently transcribed by the computer software. Please disregards these errors.  Please excuse any errors that have escaped final proofreading.)

## 2023-04-25 NOTE — ED NOTES
eTRANSFER SBAR NOTE    IP UNIT CALLED NOTE IS READY: Yes Spoke to Kyle Jaquez)     IF there are questions Call transferring nurse (your name) Mauricio Belcher at phone # [phone]    SITUATION/BACKGROUND:    Patient is being transferred to [address], Room# 2308    Patient's Chief Complaint on arrival to ED was low blood pressure  and is admitted for bradycardia and dehydration . CODE STATUS: Prior    VITAL SIGNS (MUST BE WITHIN 1 HOUR OF TRANSFER TO IP UNIT:    TEMP: 97.8  PULSE: 48  RESP: 14  BP: 118/58  PAIN SCORE: zero  MEWS: 1     ISOLATION/PRECAUTIONS: No   ISOLATION TYPE: n/a    Called outstanding consults: Yes     Are there sign and held orders that need to be released? No   Are all STAT orders completed: Yes    STAT labs collected: Yes  REPEAT LACTIC ACID DUE? No  TIME DUE: n/a  Critical Labs Results? No  What? PHARMACY NEEDS:  Are there any titrating drips? No   If so what? N/a      Are there STAT meds  that need to be given? No     The following personal items will be sent with the patient during transfer to the floor: All valuables:   ITEM:    ITEM:    ITEM:           ASSESSMENT:    CIWA Assessment: No  Last Score: n/a    NEURO: GCS15  NIH SCORE: n/a       KAILA SCREENING:  pass          NEURO ASSESSMENT:        If a Stroke Case . .. When are the next V/S, nuero, NIH due? N/a    Is patient impulsive? No   Is patient oriented? Yes   Do they follow commands? Yes  Is the patient ambulatory? Yes Device need call bell     FALL RISK? Yes   Is the [place] 1 Assessment completed? Yes  INTERVENTIONS: call bell    INTEGUMENTARY:   IS THE PATIENT UNDRESSED? No   WOUNDS PRESENT? No  On admit to ED No   ARE THE WOUNDS DOCUMENTED? No     RESPIRATORY:   Is patient on Oxygen? No    OXYGEN: Oxygen Therapy  O2 Device: None (Room air) (04/25/23 1224)  IS patient on VENT? No      CARDIAC:   Is cardiac monitoring ordered? Yes Last Rhythm: alaina  Patient to transfer with tele box on?  Yes  Is patient using a LIFE VEST? No     LINE ACCESS:   Peripheral IV 23 Anterior;Left;Proximal Forearm (Active)        /GI:   CONTINENT BOWEL/BLADDER? Yes  URINARY OUTPUT: voiding   Written Order for [place] Cath? No   CHRONIC OR ACUTE? N/a   If CHRONIC, is it 1days old, was it changed prior to specimen collection? No   WAS UA WITH REFLEX SENT TO LAB? No  IF NO,  COLLECT AND SEND PRIOR TO TRANSPORT TO INPATIENT AREA    RESTRAINTS IN USE: No      IS DOCUMENTATION COMPLETE: No   Is there a current Order? No  When does it ?  N.a    Additional details as Needed:

## 2023-04-25 NOTE — H&P
Hospitalist Admission Note    NAME:  Josué Aguero   :   1945   MRN:   [id number]     Date of admit: 2023    PCP: Leeann Ireland NP    Assessment/Plan:     Junctional bradycardia heart rates to the 30s at home and in ED POA  Hypotension at home SBP 70 per  POA  History of atrial fibrillation status post ablation POA  Essential hypertension POA  Hyperlipidemia POA   Chronic diastolic congestive heart failure POA  Overnight episodes of watery diarrhea,periumbilical abdominal pain  This morning generalized weakness   took pulse and blood pressure, systolic blood pressure approximately 70, heart rate in the 30s  EMS was called  Emergency room blood pressure 103/41, heart rate 37  EKG junctional bradycardia rate 34  IV atropine in ED, heart rate improved  Baseline takes Cardizem CD and Bystolic, will hold these  Check TSH and echocardiogram  Consult Dr. Katarina Herndon  As needed atropine as needed  As needed hydralazine for blood pressure control    Acute kidney injury POA admit creatinine 1.48  Usual baseline creatinine 0.9-1.0  Hold losartan  Gentle IV fluids overnight with her history of CHF  Recheck in a.m. Suspect hypovolemia with the diarrhea overnight    Nausea and diarrhea overnight  Severe periumbilical abdominal pain overnight  Symptoms resolved in ED, no further diarrhea  CT scan abdomen pelvis   Cirrhotic appearing liver, otherwise no abnormalities  Possible gastroenteritis  Check stool WBC, PCR, C. difficile if recurrent diarrhea  GI consult if symptoms recur    Diabetes mellitus type 2 POA  Baseline on oral agents  Sliding scale insulin  Check hemoglobin A1c    Remote history of prior pulmonary embolism POA not currently on blood thinners    Overweight  POA Body mass index is 25.75 kg/m².     DVT prophylaxis with heparin subcu    Code status: Full code  NOK:     Medical Decision Making:    Labs reviewed by myself CBC, BMP    Diagnostic data reviewed by myself CT scan abdomen pelvis, EKG    Toxic drug monitoring      Discussed case with emergency room physician, agreed on the need for admission    MDM Discussion presents with bradycardia unclear etiology, questionable vasovagal or related to medications  May be consideration of pacemaker, GI illness etiology unclear, will check stool studies if recurrent    Given the patient's current clinical presentation, I have a high level of concern for decompensation if discharged from the emergency department.   Patient will be admitted as an inpatient and estimated LOS 3      History     CHIEF COMPLAINT: \"My blood pressure and heart rate were low at home after I had diarrhea overnight\"    HISTORY OF PRESENT ILLNESS:    [de-identified] female    Currently lives at home with her   Spoke with  at bedside, patient was forgetful about some details of her history    Known atrial fibrillation status post ablation with Dr. Yessenia Schultz several years ago  Not currently on anticoagulation  Currently maintained on Cardizem CD and Bystolic    Chronic diastolic congestive heart failure  Essential hypertension  Hyperlipidemia    Diabetes mellitus on oral agents    Past week she has been \"sick\" mildly increased shortness of breath and cough which she attributed to allergies   did check her vital signs earlier in the week and did note an episode of bradycardia    Otherwise no issues until overnight  2 episodes of watery diarrhea one was \"quite large\" after midnight  No melena or bright red blood per rectum  Nausea but no vomiting  Severe periumbilical pain now improving  She felt lightheaded getting up  This morning her  checked her blood pressure, systolic blood pressure approximately 70, heart rate in the 30s  No chest pain or new shortness of breath  No fevers, diaphoresis, headaches, sore throat, dysuria  EMS was called because of the low blood pressure and heart rate    ER  No further diarrhea or vomiting  Bradycardic on arrival  EKG with junctional bradycardia heart rates 34  Elevated creatinine 1.48, usual baseline 0.9-1.0  Blood pressure 103/41  Received IV atropine with improvement of heart rate  CT scan abdomen pelvis shows some cirrhotic changes otherwise negative  Cardiology was consulted  We were called to admit the patient      Past Medical History:   Diagnosis Date    Arrhythmia     AFIB    Arthritis     Asthma     Atrial fibrillation (HCC)     CAD (coronary artery disease)     Diabetes ([address]     Heart failure ([address]     Hepatitis     AGE 8     High cholesterol     Hypertension     Other ill-defined conditions(799.89)     heart palpitations    Psychiatric disorder     depression    PUD (peptic ulcer disease)     S/P ablation of atrial fibrillation 1/28/2021 1/28/2021 PVI Afib ablation    Spinal stenosis     Thromboembolus ([address]     PULMONARY EMBOLIS IN BOTH LUNGS    Vitreous detachment of left eye     BLIND IN LEFT EYE        Past Surgical History:   Procedure Laterality Date    COLONOSCOPY N/A 4/3/2019    COLONOSCOPY performed by Aranza Linda MD at [facility]; HI RISK IND  4/3/2019         HX CATARACT REMOVAL Bilateral     HX HYSTERECTOMY      HX KNEE REPLACEMENT Right     unicodylar knee replacement    HX OTHER SURGICAL      foot left    HX TONSILLECTOMY      FL ABLATE L/R ATRIAL FIBRIL W/ISOLATED PULM VEIN N/A 1/28/2021    Ablation Following A-Fib  Addl performed by Doris Zafar MD at [facility] CATH LAB    FL COMPRE ELECTROPHYSIOL XM W/LEFT ATRIAL PACNG/REC N/A 1/28/2021    Lt Atrial Pace & Record During Ep Study performed by Doris Zafar MD at [facility] CARDIAC CATH LAB    [address] ISOLATION N/A 1/28/2021    ABLATION A-FIB  W COMPLETE EP STUDY performed by Doris Zafar MD at [facility] CARDIAC CATH LAB    FL ICAR CATHETER ABLATION ARRHYTHMIA ADD ON N/A 1/28/2021    Ablation Svt/Vt Add On performed by Doris Zafar MD at [facility] CARDIAC CATH [facility]    FL INTRACARD ECHOCARD W/THER/DX IVNTJ INCL IMG S&I N/A 1/28/2021    Intracardiac Echocardiogram performed by Kwasi Snow MD at [facility] CARDIAC CATH LAB    FL INTRACARDIAC ELECTROPHYSIOLOGIC 3D MAPPING N/A 1/28/2021    Ep 3d Mapping performed by Kwasi Snow MD at [facility] CARDIAC CATH LAB    UPPER GI ENDOSCOPY,BIOPSY  9/24/2020            Social History     Tobacco Use    Smoking status: Never    Smokeless tobacco: Never   Substance Use Topics    Alcohol use: No        Family History   Problem Relation Age of Onset    Cancer Father     Stroke Father     Heart Disease Paternal Grandfather     Heart Failure Paternal Grandfather     Stroke Paternal Grandfather         Allergies   Allergen Reactions    Baclofen Other (comments)     Hallucinations    Albuterol Palpitations    Bactrim [Sulfamethoxazole-Trimethoprim] Not Reported This Time    Diuretic Softgels [Pamabrom] Other (comments)     Lowers potassium, very sensitive      Iodine Unknown (comments)    Lipitor [Atorvastatin] Myalgia    Shellfish Containing Products Unknown (comments)     \"got choked, It might have been the spices\"    Tizanidine Unknown (comments)        Prior to Admission medications    Medication Sig Start Date End Date Taking? Authorizing Provider   levalbuterol (XOPENEX) 0.63 mg/3 mL nebu 3 mL by Nebulization route every four (4) hours as needed. Yes Other, MD Junior   polyethylene glycol (Miralax) 17 gram/dose powder Take 17 g by mouth daily. Provider, Jayce   dilTIAZem ER (CARDIZEM LA) 180 mg tablet Take 1 Tab by mouth two (2) times a day. 2/22/21   Karoline Haney MD   lidocaine 4 % patch 1 Patch by TransDERmal route every twelve (12) hours every twelve (12) hours. 2/22/21   Karoline Haney MD   omeprazole (PRILOSEC) 20 mg capsule Take 1 Cap by mouth daily.  Indications: an ulcer of the duodenum, erosive gastritis 9/24/20   Jonnie Hawkins MD   acetaminophen (Tylenol Extra Strength) 500 mg tablet Take 1,000 mg by mouth every six (6) hours as needed for Pain. Provider, Historical   nebivoloL (Bystolic) 2.5 mg tablet Take 2.5 mg by mouth nightly. Provider, Historical   levalbuterol tartrate (XOPENEX) 45 mcg/actuation inhaler INHALE 1 PUFF BY MOUTH EVERY 4 HOURS AS NEEDED 11/16/18   Provider, Historical   losartan (COZAAR) 50 mg tablet Take 25 mg by mouth daily. 1/2 tablet daily    Provider, Historical   furosemide (LASIX) 20 mg tablet 1 tab every other day as needed as per cardiology  Patient taking differently: Take 40 mg by mouth daily. 40 mg daily and may take 1/2 extra tablet as needed in the afternoon for edema 12/26/13   Keshawn Osborne MD   Blood-Glucose Meter monitoring kit With testing strips & lancets 1 month supply 12/26/13   Niurka MONTIEL MD   gemfibrozil (LOPID) 600 mg tablet Take 600 mg by mouth two (2) times a day. Other, MD Junior   sitaGLIPtin (JANUVIA) 100 mg tablet Take 100 mg by mouth daily. Provider, Historical   buPROPion SR (WELLBUTRIN SR) 150 mg SR tablet Take 150 mg by mouth two (2) times a day. Junior Mcconnell MD   [facility]) 220 mcg (30 doses) inhalation capsule Take 1 Cap by inhalation two (2) times a day. Junior Mcconnell MD   montelukast (SINGULAIR) 10 mg tablet Take 10 mg by mouth nightly.     Junior Mcconnell MD       Review of symptoms:     POSITIVE= Bold  Negative = not bold  General:  fever, chills, sweats, generalized weakness, weight loss     loss of appetite   Eyes:    blurred vision, eye pain, double vision  ENT:    Coryza, sore throat, trouble swallowing  Respiratory:   cough, sputum, SOB  Cardiology:   chest pain, orthopnea, PND, edema  Gastrointestinal:  abdominal pain , N/V, diarrhea, constipation, melena or BRBPR  Genitourinary:  Urgency, dysuria, hematuria  Muskuloskeletal :  Joint redness, swelling or acute joint pain, myalgias  Hematology:  easy bruising, nose or gum bleeding  Dermatological: rash, ulceration  Endocrine:   Polyuria or polydipsia, heat or hold intolerance  Neurological:  Headache, focal motor or sensory changes     Speech difficulties, memory loss  Psychological: depression, agitation      Objective:   VITALS:    Patient Vitals for the past 24 hrs:   Temp Pulse Resp BP SpO2   23 1641 97 °F (36.1 °C) (!) 52 18 131/79 99 %   23 1541 97.8 °F (36.6 °C) (!) 48 14 (!) 118/58 --   23 1427 -- 63 20 (!) 130/51 98 %   23 1423 -- (!) 36 -- (!) 103/41 --   23 1354 -- (!) 35 15 (!) 113/45 98 %   23 1254 -- (!) 33 17 (!) 116/52 95 %   23 1224 98.2 °F (36.8 °C) (!) 37 14 (!) 103/41 97 %     Temp (24hrs), Av.7 °F (36.5 °C), Min:97 °F (36.1 °C), Max:98.2 °F (36.8 °C)      O2 Device: None (Room air)    Wt Readings from Last 12 Encounters:   23 68 kg (150 lb)   22 63.5 kg (140 lb)   21 63.5 kg (140 lb)   21 62.1 kg (137 lb)   21 65.8 kg (145 lb 1 oz)   21 65.8 kg (145 lb)   20 68 kg (150 lb)   20 71.2 kg (156 lb 15.5 oz)   08/10/20 71.3 kg (157 lb 3 oz)   19 66.7 kg (147 lb)   18 66.2 kg (146 lb)   18 63.5 kg (140 lb)         PHYSICAL EXAM:     I had a face to face encounter and independently examined this patient on 23  as outlined below:    General:    Alert, cooperative in no distress     HEENT: Normocephalic, atraumatic    PERRL,   Sclera no icterus    Nasal mucosa without masses or discharge  Hearing intact to voice    Oropharynx without erythema or exudate   Neck:  No meningismus, trachea midline, no carotid bruits     Thyroid not enlarged, no nodules or tenderness  Lungs:   Clear to auscultation bilaterally. No wheezing or rales    No accessory muscle use or retractions. Heart:   Regular rate and rhythm,  no murmur or gallop. No LE edema  Abdomen:   Soft, non-tender. Not distended.   Bowel sounds normal.     No masses, No Hepatosplenomegaly, No Rebound or guarding  Lymph nodes: No cervical or inguinal ERIN  Musculoskeletal:  No Joint swelling, erythema, warmth. No Cyanosis or clubbing  Skin:      No rashes      Not Jaundiced   No nodules or thickening    Capillary refill normal  Neurologic: Alert, follows commands     Cranial nerves 2 to 12 intact    Symmetric motor strength bilaterally       LAB DATA REVIEWED:    Recent Results (from the past 12 hour(s))   CBC WITH AUTOMATED DIFF    Collection Time: 04/25/23 12:32 PM   Result Value Ref Range    WBC 6.3 3.6 - 11.0 K/uL    RBC 3.43 (L) 3.80 - 5.20 M/uL    HGB 10.8 (L) 11.5 - 16.0 g/dL    HCT 33.3 (L) 35.0 - 47.0 %    MCV 97.1 80.0 - 99.0 FL    MCH 31.5 26.0 - 34.0 PG    MCHC 32.4 30.0 - 36.5 g/dL    RDW 13.6 11.5 - 14.5 %    PLATELET 584 (L) 191 - 400 K/uL    MPV 10.6 8.9 - 12.9 FL    NRBC 0.0 0  WBC    ABSOLUTE NRBC 0.00 0.00 - 0.01 K/uL    NEUTROPHILS 78 (H) 32 - 75 %    LYMPHOCYTES 10 (L) 12 - 49 %    MONOCYTES 8 5 - 13 %    EOSINOPHILS 2 0 - 7 %    BASOPHILS 1 0 - 1 %    IMMATURE GRANULOCYTES 1 (H) 0.0 - 0.5 %    ABS. NEUTROPHILS 4.9 1.8 - 8.0 K/UL    ABS. LYMPHOCYTES 0.6 (L) 0.8 - 3.5 K/UL    ABS. MONOCYTES 0.5 0.0 - 1.0 K/UL    ABS. EOSINOPHILS 0.1 0.0 - 0.4 K/UL    ABS. BASOPHILS 0.1 0.0 - 0.1 K/UL    ABS. IMM. GRANS. 0.1 (H) 0.00 - 0.04 K/UL    DF SMEAR SCANNED      RBC COMMENTS NORMOCYTIC, NORMOCHROMIC     METABOLIC PANEL, COMPREHENSIVE    Collection Time: 04/25/23 12:32 PM   Result Value Ref Range    Sodium 139 136 - 145 mmol/L    Potassium 4.2 3.5 - 5.1 mmol/L    Chloride 109 (H) 97 - 108 mmol/L    CO2 26 21 - 32 mmol/L    Anion gap 4 (L) 5 - 15 mmol/L    Glucose 226 (H) 65 - 100 mg/dL    BUN 28 (H) 6 - 20 MG/DL    Creatinine 1.48 (H) 0.55 - 1.02 MG/DL    BUN/Creatinine ratio 19 12 - 20      eGFR 36 (L) >60 ml/min/1.73m2    Calcium 8.4 (L) 8.5 - 10.1 MG/DL    Bilirubin, total 1.2 (H) 0.2 - 1.0 MG/DL    ALT (SGPT) 23 12 - 78 U/L    AST (SGOT) 22 15 - 37 U/L    Alk.  phosphatase 62 45 - 117 U/L    Protein, total 6.1 (L) 6.4 - 8.2 g/dL    Albumin 3.4 (L) 3.5 - 5.0 g/dL    Globulin 2.7 2.0 - 4.0 g/dL    A-G Ratio 1.3 1.1 - 2.2     LIPASE    Collection Time: 04/25/23 12:32 PM   Result Value Ref Range    Lipase 208 73 - 393 U/L   EKG, 12 LEAD, INITIAL    Collection Time: 04/25/23 12:35 PM   Result Value Ref Range    Ventricular Rate 34 BPM    Atrial Rate 34 BPM    QRS Duration 84 ms    Q-T Interval 570 ms    QTC Calculation (Bezet) 428 ms    Calculated R Axis 132 degrees    Calculated T Axis 44 degrees    Diagnosis       Junctional bradycardia  Right axis deviation  Low voltage QRS  Cannot rule out Anteroseptal infarct (cited on or before 22-FEB-2021)  When compared with ECG of 20-MAY-2021 07:10,  Junctional rhythm has replaced Atrial fibrillation  Vent. rate has decreased BY  37 BPM  Questionable change in initial forces of Anterior leads  T wave inversion now evident in Anterior leads     POC LACTIC ACID    Collection Time: 04/25/23 12:46 PM   Result Value Ref Range    Lactic Acid (POC) 0.82 0.40 - 2.00 mmol/L       EKG as read by me shows      CT Results  (Last 48 hours)                 04/25/23 1331  CT ABD PELV WO CONT Final result    Impression:  Cirrhotic morphology of the liver. No acute abnormality. Narrative:  EXAM: CT ABD PELV WO CONT       INDICATION: abd pain, n/v/d       COMPARISON: 5/20/2021       IV CONTRAST: None. ORAL CONTRAST: None       TECHNIQUE:    Thin axial images were obtained through the abdomen and pelvis. Coronal and   sagittal reformats were generated. CT dose reduction was achieved through use of   a standardized protocol tailored for this examination and automatic exposure   control for dose modulation. The absence of intravenous contrast material reduces the sensitivity for   evaluation of the vasculature and solid organs.        FINDINGS:    LOWER THORAX: No significant abnormality in the incidentally imaged lower chest.   LIVER: Cirrhotic morphology of the liver is noted without convincing mass lesion   given the lack of intravenous contrast.   BILIARY TREE: Gallbladder is within normal limits. CBD is not dilated. SPLEEN: within normal limits. PANCREAS: No focal abnormality. ADRENALS: Unremarkable. KIDNEYS/URETERS: No calculus or hydronephrosis. STOMACH: Unremarkable. SMALL BOWEL: No dilatation or wall thickening. COLON: No dilatation or wall thickening. APPENDIX: Within normal limits. PERITONEUM: No ascites or pneumoperitoneum. RETROPERITONEUM: No lymphadenopathy or aortic aneurysm. REPRODUCTIVE ORGANS: The uterus is surgically absent. URINARY BLADDER: No mass or calculus. BONES: Degenerative changes are seen in the lumbar spine. Chronic compression   fractures are noted at L1 and L2. ABDOMINAL WALL: No mass or hernia. ADDITIONAL COMMENTS: N/A                     CT ABD PELV WO CONT    Result Date: 4/25/2023  Cirrhotic morphology of the liver. No acute abnormality. I saw the patient personally, took a history and did a complete physical exam at the bedside. I performed complex decision making in coming up with a diagnostic and treatment plan for the patient. I reviewed the patient's past medical records, current laboratory and radiology results, and actual Xray films/EKG. I have also discussed this case with the involved ED physician.     Care Plan discussed with:    Patient, Family, ED Doc    Risk of deterioration:  High    Total Time Coordinating Admission:   75  minutes    Total Critical Care Time:         Aby Warner MD

## 2023-04-25 NOTE — ED NOTES
Offered hospital gown but patient refused   She wants to changed her clothes once she got to the room

## 2023-04-25 NOTE — PROGRESS NOTES
1641: TRANSFER - IN REPORT:    Verbal report received from Marty RYDER (name) on Sandeep Dimas  being received from ER (unit) for routine progression of care      Report consisted of patients Situation, Background, Assessment and   Recommendations(SBAR). Information from the following report(s) SBAR, Kardex, Intake/Output, and MAR was reviewed with the receiving nurse. Opportunity for questions and clarification was provided. Assessment completed upon patients arrival to unit and care assumed. Primary Nurse Nena Duckworth RN and Hubert Soto RN performed a dual skin assessment on this patient No impairment noted  Brady score is 19     1705: Message sent to MD asking for albuteral tx per pt request.     1800: No orders in for pt. MD has not seen pt yet and no signed and held orders. 1900: End of Shift Note    Bedside shift change report given to Hobbs (oncoming nurse) by Nena Duckworth RN (offgoing nurse). Report included the following information SBAR, Kardex, Intake/Output, and MAR    Shift worked:  2929-1216     Shift summary and any significant changes:     No significant changes, pt arrived on unit     Concerns for physician to address:  N/a     Zone phone for oncoming shift:          Activity:  Activity Level: Up with Assistance  Number times ambulated in hallways past shift: 0  Number of times OOB to chair past shift: 0    Cardiac:   Cardiac Monitoring: Yes      Cardiac Rhythm: Sinus Piyush    Access:  Current line(s): PIV     Genitourinary:   Urinary status: voiding    Respiratory:   O2 Device: None (Room air)  Chronic home O2 use?: NO  Incentive spirometer at bedside: NO       GI:  Last Bowel Movement Date: 04/25/23  Current diet:  ADULT DIET Regular; 4 carb choices (60 gm/meal);  Low Fat/Low Chol/High Fiber/HARSHAD  Passing flatus: YES  Tolerating current diet: YES       Pain Management:   Patient states pain is manageable on current regimen: YES    Skin:  Brady Score: 19  Interventions: increase time out of bed    Patient Safety:  Fall Score:    Interventions: pt to call before getting OOB       Length of Stay:  Expected LOS: 1d 19h  Actual LOS: 0      Leora Sanders RN

## 2023-04-26 LAB
ALBUMIN SERPL-MCNC: 4 G/DL (ref 3.5–5)
ALBUMIN/GLOB SERPL: 1.5 (ref 1.1–2.2)
ALP SERPL-CCNC: 70 U/L (ref 45–117)
ALT SERPL-CCNC: 25 U/L (ref 12–78)
ANION GAP SERPL CALC-SCNC: 7 MMOL/L (ref 5–15)
AST SERPL-CCNC: 26 U/L (ref 15–37)
ATRIAL RATE: 34 BPM
BASOPHILS # BLD: 0 K/UL (ref 0–0.1)
BASOPHILS NFR BLD: 1 % (ref 0–1)
BILIRUB SERPL-MCNC: 1.3 MG/DL (ref 0.2–1)
BUN SERPL-MCNC: 22 MG/DL (ref 6–20)
BUN/CREAT SERPL: 22 (ref 12–20)
CALCIUM SERPL-MCNC: 9.2 MG/DL (ref 8.5–10.1)
CALCULATED R AXIS, ECG10: 132 DEGREES
CALCULATED T AXIS, ECG11: 44 DEGREES
CHLORIDE SERPL-SCNC: 110 MMOL/L (ref 97–108)
CO2 SERPL-SCNC: 24 MMOL/L (ref 21–32)
CREAT SERPL-MCNC: 0.98 MG/DL (ref 0.55–1.02)
DIAGNOSIS, 93000: NORMAL
DIFFERENTIAL METHOD BLD: ABNORMAL
EOSINOPHIL # BLD: 0.2 K/UL (ref 0–0.4)
EOSINOPHIL NFR BLD: 2 % (ref 0–7)
ERYTHROCYTE [DISTWIDTH] IN BLOOD BY AUTOMATED COUNT: 13.4 % (ref 11.5–14.5)
EST. AVERAGE GLUCOSE BLD GHB EST-MCNC: 128 MG/DL
GLOBULIN SER CALC-MCNC: 2.7 G/DL (ref 2–4)
GLUCOSE BLD STRIP.AUTO-MCNC: 147 MG/DL (ref 65–117)
GLUCOSE BLD STRIP.AUTO-MCNC: 169 MG/DL (ref 65–117)
GLUCOSE BLD STRIP.AUTO-MCNC: 89 MG/DL (ref 65–117)
GLUCOSE BLD STRIP.AUTO-MCNC: 91 MG/DL (ref 65–117)
GLUCOSE BLD STRIP.AUTO-MCNC: NORMAL MG/DL (ref 65–117)
GLUCOSE SERPL-MCNC: 102 MG/DL (ref 65–100)
HBA1C MFR BLD: 6.1 % (ref 4–5.6)
HCT VFR BLD AUTO: 34.1 % (ref 35–47)
HGB BLD-MCNC: 11 G/DL (ref 11.5–16)
IMM GRANULOCYTES # BLD AUTO: 0 K/UL (ref 0–0.04)
IMM GRANULOCYTES NFR BLD AUTO: 0 % (ref 0–0.5)
INR PPP: 1.1 (ref 0.9–1.1)
LYMPHOCYTES # BLD: 1.2 K/UL (ref 0.8–3.5)
LYMPHOCYTES NFR BLD: 18 % (ref 12–49)
MCH RBC QN AUTO: 30.3 PG (ref 26–34)
MCHC RBC AUTO-ENTMCNC: 32.3 G/DL (ref 30–36.5)
MCV RBC AUTO: 93.9 FL (ref 80–99)
MONOCYTES # BLD: 0.6 K/UL (ref 0–1)
MONOCYTES NFR BLD: 9 % (ref 5–13)
NEUTS SEG # BLD: 4.6 K/UL (ref 1.8–8)
NEUTS SEG NFR BLD: 70 % (ref 32–75)
NRBC # BLD: 0 K/UL (ref 0–0.01)
NRBC BLD-RTO: 0 PER 100 WBC
PLATELET # BLD AUTO: 123 K/UL (ref 150–400)
PMV BLD AUTO: 9.8 FL (ref 8.9–12.9)
POTASSIUM SERPL-SCNC: 3.8 MMOL/L (ref 3.5–5.1)
PROT SERPL-MCNC: 6.7 G/DL (ref 6.4–8.2)
PROTHROMBIN TIME: 11 SEC (ref 9–11.1)
Q-T INTERVAL, ECG07: 570 MS
QRS DURATION, ECG06: 84 MS
QTC CALCULATION (BEZET), ECG08: 428 MS
RBC # BLD AUTO: 3.63 M/UL (ref 3.8–5.2)
SERVICE CMNT-IMP: ABNORMAL
SERVICE CMNT-IMP: ABNORMAL
SERVICE CMNT-IMP: NORMAL
SODIUM SERPL-SCNC: 141 MMOL/L (ref 136–145)
TROPONIN I SERPL HS-MCNC: 102 NG/L (ref 0–51)
TSH SERPL DL<=0.05 MIU/L-ACNC: 1.05 UIU/ML (ref 0.36–3.74)
VENTRICULAR RATE, ECG03: 34 BPM
WBC # BLD AUTO: 6.6 K/UL (ref 3.6–11)

## 2023-04-26 PROCEDURE — 84443 ASSAY THYROID STIM HORMONE: CPT

## 2023-04-26 PROCEDURE — 82962 GLUCOSE BLOOD TEST: CPT

## 2023-04-26 PROCEDURE — 36415 COLL VENOUS BLD VENIPUNCTURE: CPT

## 2023-04-26 PROCEDURE — 74011250636 HC RX REV CODE- 250/636: Performed by: INTERNAL MEDICINE

## 2023-04-26 PROCEDURE — 85025 COMPLETE CBC W/AUTO DIFF WBC: CPT

## 2023-04-26 PROCEDURE — 74011250637 HC RX REV CODE- 250/637: Performed by: INTERNAL MEDICINE

## 2023-04-26 PROCEDURE — 97116 GAIT TRAINING THERAPY: CPT

## 2023-04-26 PROCEDURE — 83036 HEMOGLOBIN GLYCOSYLATED A1C: CPT

## 2023-04-26 PROCEDURE — 65660000001 HC RM ICU INTERMED STEPDOWN

## 2023-04-26 PROCEDURE — 80053 COMPREHEN METABOLIC PANEL: CPT

## 2023-04-26 PROCEDURE — 97161 PT EVAL LOW COMPLEX 20 MIN: CPT

## 2023-04-26 PROCEDURE — 97165 OT EVAL LOW COMPLEX 30 MIN: CPT

## 2023-04-26 PROCEDURE — 84484 ASSAY OF TROPONIN QUANT: CPT

## 2023-04-26 PROCEDURE — 74011000250 HC RX REV CODE- 250: Performed by: INTERNAL MEDICINE

## 2023-04-26 PROCEDURE — 74011636637 HC RX REV CODE- 636/637: Performed by: INTERNAL MEDICINE

## 2023-04-26 PROCEDURE — 85610 PROTHROMBIN TIME: CPT

## 2023-04-26 RX ORDER — LANOLIN ALCOHOL/MO/W.PET/CERES
3 CREAM (GRAM) TOPICAL
Status: DISCONTINUED | OUTPATIENT
Start: 2023-04-26 | End: 2023-04-29 | Stop reason: HOSPADM

## 2023-04-26 RX ORDER — GUAIFENESIN 100 MG/5ML
100 SOLUTION ORAL
Status: DISCONTINUED | OUTPATIENT
Start: 2023-04-26 | End: 2023-04-29 | Stop reason: HOSPADM

## 2023-04-26 RX ORDER — HYDRALAZINE HYDROCHLORIDE 20 MG/ML
10 INJECTION INTRAMUSCULAR; INTRAVENOUS
Status: DISCONTINUED | OUTPATIENT
Start: 2023-04-26 | End: 2023-04-29 | Stop reason: HOSPADM

## 2023-04-26 RX ORDER — MORPHINE SULFATE 2 MG/ML
2 INJECTION, SOLUTION INTRAMUSCULAR; INTRAVENOUS
Status: DISCONTINUED | OUTPATIENT
Start: 2023-04-26 | End: 2023-04-29 | Stop reason: HOSPADM

## 2023-04-26 RX ADMIN — HEPARIN SODIUM 5000 UNITS: 5000 INJECTION INTRAVENOUS; SUBCUTANEOUS at 05:00

## 2023-04-26 RX ADMIN — Medication 2 UNITS: at 12:17

## 2023-04-26 RX ADMIN — Medication 500 MCG: at 09:17

## 2023-04-26 RX ADMIN — HEPARIN SODIUM 5000 UNITS: 5000 INJECTION INTRAVENOUS; SUBCUTANEOUS at 22:03

## 2023-04-26 RX ADMIN — HEPARIN SODIUM 5000 UNITS: 5000 INJECTION INTRAVENOUS; SUBCUTANEOUS at 14:10

## 2023-04-26 RX ADMIN — SODIUM CHLORIDE, PRESERVATIVE FREE 10 ML: 5 INJECTION INTRAVENOUS at 05:00

## 2023-04-26 RX ADMIN — SODIUM CHLORIDE, PRESERVATIVE FREE 10 ML: 5 INJECTION INTRAVENOUS at 14:00

## 2023-04-26 RX ADMIN — ACETAMINOPHEN 650 MG: 325 TABLET ORAL at 15:55

## 2023-04-26 RX ADMIN — SODIUM CHLORIDE, PRESERVATIVE FREE 10 ML: 5 INJECTION INTRAVENOUS at 22:05

## 2023-04-26 RX ADMIN — BUPROPION HYDROCHLORIDE 150 MG: 150 TABLET, EXTENDED RELEASE ORAL at 17:58

## 2023-04-26 RX ADMIN — ASPIRIN 81 MG: 81 TABLET, CHEWABLE ORAL at 09:17

## 2023-04-26 RX ADMIN — ACETAMINOPHEN 650 MG: 325 TABLET ORAL at 01:35

## 2023-04-26 RX ADMIN — Medication 2 UNITS: at 08:41

## 2023-04-26 RX ADMIN — ACETAMINOPHEN 650 MG: 325 TABLET ORAL at 09:30

## 2023-04-26 RX ADMIN — BUPROPION HYDROCHLORIDE 150 MG: 150 TABLET, EXTENDED RELEASE ORAL at 09:17

## 2023-04-26 NOTE — PROGRESS NOTES
Transition of Care Plan:    RUR: 8% \"low risk\"  Disposition: Home with follow up apts  Follow up appointments: PCP & Specialists as needed  DME needed: Has a rollator and cane at home  Transportation at Discharge: Pt's spouse will provide transport at d/c  [place] or means to access home: Pt has access to home      IM Medicare Letter: 2nd IM letter needed  Is patient a  and connected with the [facility]? N/A  Caregiver Contact: Pt's spouse Philis Boas [phone]  Discharge Caregiver contacted prior to discharge? Per pt's request  Care Conference needed?: Not at this time    Initial note: Chart reviewed for updates. CM met with pt at bedside, introduced role, confirmed demographics were up-to-date and discussed d/c planning. Pt lives with her spouse in a 2 level home with 5 steps before you get to the front door. Pt's spouse, 2 sons and grankids are her support system. Pt is independent with ADL's at home and uses a rollator or cane to ambulate outside the home. Pt denied history of IPR/HH/SNF. Pt has a history of OP rehab 3 years ago. Pt uses [facility] at [address], [address] [facility]. PT/OT reported no needs for pt at this time. Pt's spouse Philis Boas [phone] will provide transport at d/c. CM will continue to follow up with d/c planning. Reason for Admission:  Bradycardia                 RUR Score: 8% \"low risk\"                     Plan for utilizing home health: Pt is independent with ADL's at home and uses a rollator or cane to ambulate outside the home. Pt denied history of IPR/HH/SNF.  PT/OT reported no needs for pt at this time         PCP: First and Last name:  Norm Morrison NP     Name of Practice: [facility]   Are you a current patient: Yes/No: Yes   Approximate date of last visit: 2 weeks ago   Can you participate in a virtual visit with your PCP: In-person                    Current Advanced Directive/Advance Care Plan: Full Code-Has ACP on file    Healthcare Decision Maker: Pt's spouse Bulmaro Gutierrez [phone]    Care Management Interventions  PCP Verified by CM: Yes  Palliative Care Criteria Met (RRAT>21 & CHF Dx)?: No  Mode of Transport at Discharge:  Other (see comment) (Spouse will provide transport at d/c)  Transition of Care Consult (CM Consult): Discharge Planning  Discharge Durable Medical Equipment: No  Health Maintenance Reviewed:  (Has DME at home)  Physical Therapy Consult: Yes  Occupational Therapy Consult: Yes  Speech Therapy Consult: Yes  Support Systems: Spouse/Significant Other, Child(fay) (Pt's spouse and children are her main support system)  Confirm Follow Up Transport: Family (Spouse will provide transport at d/c)  The Plan for Transition of Care is Related to the Following Treatment Goals : Home with family and follow up appts  Discharge Location  Patient Expects to be Discharged to[de-identified] Home with family assistance (Pt will d/c Home with family and follow up appts)     ALMITA Rosen    [phone]

## 2023-04-26 NOTE — PROGRESS NOTES
Occupational Therapy EVALUATION/discharge with a functional measure  Patient: Ritesh Jones [de-identified] y.o. female)  Date: 4/26/2023  Primary Diagnosis: Bradycardia [R00.1]       Precautions:   Contact (c-diff r/o)    ASSESSMENT AND RECOMMENDATIONS:  Based on the objective data described below, the patient presents with baseline level for ADL and I-ADL with slight weakness (much improved), decreased dynamic standing balance and B UE tremors. She was I to mod I PTA (see below) using AD or none pending environment. She is currently Setup-S to I to mod I for simple ADL and reports she is at her baseline. She does not warrant any OT at this time with no needs at home. D/C OT. Skilled occupational therapy is not indicated at this time. Discharge Recommendations: None  Further Equipment Recommendations for Discharge: has all DME      SUBJECTIVE:   Patient stated I feel so much better today.     OBJECTIVE DATA SUMMARY:     Past Medical History:   Diagnosis Date    Arrhythmia     AFIB    Arthritis     Asthma     Atrial fibrillation (HCC)     CAD (coronary artery disease)     Diabetes ([address]     Heart failure (HCC)     Hepatitis     AGE 8     High cholesterol     Hypertension     Other ill-defined conditions(799.89)     heart palpitations    Psychiatric disorder     depression    PUD (peptic ulcer disease)     S/P ablation of atrial fibrillation 1/28/2021 1/28/2021 PVI Afib ablation    Spinal stenosis     Thromboembolus ([address]     PULMONARY EMBOLIS IN BOTH LUNGS    Vitreous detachment of left eye     BLIND IN LEFT EYE     Past Surgical History:   Procedure Laterality Date    COLONOSCOPY N/A 4/3/2019    COLONOSCOPY performed by Toby Sena., MD at [address]; HI RISK IND  4/3/2019         HX CATARACT REMOVAL Bilateral     HX HYSTERECTOMY      HX KNEE REPLACEMENT Right     unicodylar knee replacement    HX OTHER SURGICAL      foot left    HX TONSILLECTOMY      AR ABLATE L/R ATRIAL FIBRIL W/ISOLATED PULM VEIN N/A 1/28/2021    Ablation Following A-Fib  Addl performed by Rosa Maria iTnoco MD at [facility]    [place] COMPRE ELECTROPHYSIOL XM W/LEFT ATRIAL PACNG/REC N/A 1/28/2021    Lt Atrial Pace & Record During Ep Study performed by Rosa Maria Tinoco MD at [facility]    [address] FIB PULM VEIN ISOLATION N/A 1/28/2021    ABLATION A-FIB  W COMPLETE EP STUDY performed by Rosa Maria Tinoco MD at [facility]    [place] ICAR CATHETER ABLATION ARRHYTHMIA ADD ON N/A 1/28/2021    Ablation Svt/Vt Add On performed by Rosa Maria Tinoco MD at [facility]    [place] INTRACARD [facility] W/THER/DX IVNTJ INCL IMG S&I N/A 1/28/2021    Intracardiac Echocardiogram performed by Rosa Maria Tinoco MD at [facility]    [place] INTRACARDIAC ELECTROPHYSIOLOGIC 3D MAPPING N/A 1/28/2021    Ep 3d Mapping performed by Rosa Maria Tinoco MD at [facility]    UPPER GI ENDOSCOPY,BIOPSY  9/24/2020          Prior Level of Function/Home Situation: Lives with , was I in home without AD, uses rollator in rd and [facility] out of home in community or at night when walking to bathroom. She denies any falls. She does NOT drive,  performs I-ADL yet she does do some meal prep she can do safely with tremors.    Home Situation  Home Environment: Private residence  # Steps to Enter: 5  Rails to Enter: Yes  Hand Rails : Left  Wheelchair Ramp: No  One/Two Story Residence: Two story  # of Interior Steps: 14  Interior Rails: Right  Living Alone: No  Support Systems: Spouse/Significant Other  Patient Expects to be Discharged to[de-identified] Home  Current DME Used/Available at Home: Jb Stevenson, straight, Walker, rollator, Grab bars, Shower chair (uses rollator outside, uses SPC at night, SPC in community, stands for shower)  Tub or Shower Type: Shower  [x]     Right hand dominant   []     Left hand dominant  Cognitive/Behavioral Status:                    Vision/Perceptual: Balance:  Sitting: Intact; Without support  Range of Motion:  BUE, can tailor sit  AROM: Within functional limits  PROM: Within functional limits                    Strength:  5/5 grossly  Strength: Within functional limits              Coordination:  Coordination: Generally decreased, functional (B UE tremors, L >R)  Fine Motor Skills-Upper: Left Intact; Right Intact    Gross Motor Skills-Upper: Left Intact; Right Intact  Tone & Sensation:  NT                            Functional Mobility and Transfers for ADLs:  Bed Mobility:     Supine to Sit: Independent     Scooting: Independent  Transfers:  Sit to Stand: Independent     Bed to Chair: Independent          Toilet Transfer : Independent; Modified independent           Bathroom Mobility: Independent  ADL Assessment:  Feeding: Setup;Modified independent; Independent (tremors vary and impact FMC)    Oral Facial Hygiene/Grooming: Independent STANDING at sink    Bathing: Supervision    Upper Body Dressing: Setup    Lower Body Dressing: Setup    Toileting: Modified independent              ADL Intervention:                                          Therapeutic Exercise:      Functional Measure:    Barthel Index:  Bathin  Bladder: 10  Bowels: 10  Groomin  Dressing: 10  Feedin  Mobility: 15  Stairs: 10  Toilet Use: 10  Transfer (Bed to Chair and Back): 15  Total: 95/100      The Barthel ADL Index: Guidelines  1. The index should be used as a record of what a patient does, not as a record of what a patient could do. 2. The main aim is to establish degree of independence from any help, physical or verbal, however minor and for whatever reason. 3. The need for supervision renders the patient not independent. 4. A patient's performance should be established using the best available evidence. Asking the patient, friends/relatives and nurses are the usual sources, but direct observation and common sense are also important.  However direct testing is not needed. 5. Usually the patient's performance over the preceding 24-48 hours is important, but occasionally longer periods will be relevant. 6. Middle categories imply that the patient supplies over 50 per cent of the effort. 7. Use of aids to be independent is allowed. Score Interpretation (from [address])    Independent   60-79 Minimally independent   40-59 Partially dependent   20-39 Very dependent   <20 Totally dependent     -Alexis Ford., Barthel, DDYLAN. (1965). Functional evaluation: the Barthel Index. [address] ([address] (1997). The Barthel activities of daily living index: self-reporting versus actual performance in the old (> or = 75 years). Journal of [address] [place] [address] 45(7), 14 [facility], J..., Asim Hart., [facility]. (1999). Measuring the change in disability after inpatient rehabilitation; comparison of the responsiveness of the Barthel Index and Functional [place] Measure. Journal of Neurology, Neurosurgery, and Psychiatry, 66(4), 471-628. Michelle Handley, N.J.A, ANDREI Curran, & Yohan Travis, M.A. (2004) Assessment of post-stroke quality of life in cost-effectiveness studies: The usefulness of the Barthel Index and the EuroQoL-5D. Quality of Life Research, 13, 464-25          Pain:  Pain Scale 1: Numeric (0 - 10)  Pain Intensity 1: 0              Activity Tolerance:   Good    Vitals:    04/26/23 0351 04/26/23 0745 04/26/23 0928 04/26/23 0931   BP: (!) 165/63 (!) 159/72 (!) 151/66 (!) 159/70   BP 1 Location: Right upper arm Right upper arm Left upper arm    BP Patient Position: At rest At rest Semi fowlers Sitting   Pulse: 88 80 80 80   Temp:  98.4 °F (36.9 °C)     Resp: 18 18     Height:       Weight: 68.9 kg (151 lb 14.4 oz)      SpO2: 96% 92% 95% 95%      Please refer to the flowsheet for vital signs taken during this treatment.   After treatment:   [x]  Patient left in no apparent distress sitting up in chair  []  Patient left in no apparent distress in bed  [x]  Call bell left within reach  [x]  Nursing notified  [x]  Caregiver present  [x]  chair alarm activated    COMMUNICATION/EDUCATION:   Communication/Collaboration:  [x]      Home safety education was provided and the patient/caregiver indicated understanding. [x]      Patient/family have participated as able and agree with findings and recommendations. []      Patient is unable to participate in plan of care at this time.   Findings and recommendations were discussed with: Registered Nurse    ALHAJI Christopher/L  Time Calculation: 27 mins

## 2023-04-26 NOTE — PROGRESS NOTES
physical Therapy EVALUATION/DISCHARGE with a functional measure  Patient: Alexander Nguyen [de-identified] y.o. female)  Date: 4/26/2023  Primary Diagnosis: Bradycardia [R00.1]       Precautions:   Contact (c-diff r/o)  ASSESSMENT :  Based on the objective data described below, the patient presents with resolved bradycardia, vital signs now stable. Patient reporting only slight weakness and fatigue. Pt was evaluated for any mobility needs, after being cleared by RN. Pt was sitting in the chair (which she prefers). Pt ambulated in hallway 150 ft with CGA only  Pt uses a SPC  out in community and at [employer] normally. She was independent in all functional mobility today. Pt also did a few steps. Skilled physical therapy is not indicated at this time. PLAN :  Discharge Recommendations: None  Further Equipment Recommendations for Discharge: none     SUBJECTIVE:   Patient stated I do fine at home. I have 14 steps and use the rail. Jessica Puente    OBJECTIVE DATA SUMMARY:     Past Medical History:   Diagnosis Date    Arrhythmia     AFIB    Arthritis     Asthma     Atrial fibrillation (HCC)     CAD (coronary artery disease)     Diabetes ([address]     Heart failure (HCC)     Hepatitis     AGE 8     High cholesterol     Hypertension     Other ill-defined conditions(799.89)     heart palpitations    Psychiatric disorder     depression    PUD (peptic ulcer disease)     S/P ablation of atrial fibrillation 1/28/2021 1/28/2021 PVI Afib ablation    Spinal stenosis     Thromboembolus ([address]     PULMONARY EMBOLIS IN BOTH LUNGS    Vitreous detachment of left eye     BLIND IN LEFT EYE     Past Surgical History:   Procedure Laterality Date    COLONOSCOPY N/A 4/3/2019    COLONOSCOPY performed by Isidro Beard MD at [address]; HI RISK IND  4/3/2019         HX CATARACT REMOVAL Bilateral     HX HYSTERECTOMY      HX KNEE REPLACEMENT Right     unicodylar knee replacement    HX OTHER SURGICAL      foot left    HX TONSILLECTOMY      [place] ABLATE L/R ATRIAL FIBRIL W/ISOLATED PULM VEIN N/A 1/28/2021    Ablation Following A-Fib  Addl performed by Hao Alberto MD at [facility]    [place] COMPRE ELECTROPHYSIOL XM W/LEFT ATRIAL PACNG/REC N/A 1/28/2021    Lt Atrial Pace & Record During Ep Study performed by Hao Alberto MD at [facility]    [address] FIB PULM VEIN ISOLATION N/A 1/28/2021    ABLATION A-FIB  W COMPLETE EP STUDY performed by Hao Alberto MD at [facility]    [place] ICAR CATHETER ABLATION ARRHYTHMIA ADD ON N/A 1/28/2021    Ablation Svt/Vt Add On performed by Hao Alberto MD at [facility]    [place] INTRACARD [facility] W/THER/DX IVNTJ INCL IMG S&I N/A 1/28/2021    Intracardiac Echocardiogram performed by Hao Alberto MD at [facility]    [place] INTRACARDIAC ELECTROPHYSIOLOGIC 3D MAPPING N/A 1/28/2021    Ep 3d Mapping performed by Hao Alberto MD at [facility]    UPPER GI ENDOSCOPY,BIOPSY  9/24/2020          Prior Level of Function/Home Situation: independent  Home Situation  Home Environment: Private residence  # Steps to Enter: 5  Rails to Enter: Yes  Hand Rails : Left  Wheelchair Ramp: No  One/Two Story Residence: Two story  # of Interior Steps: 14  Interior Rails: Right  Living Alone: No  Support Systems: Spouse/Significant Other  Patient Expects to be Discharged to[de-identified] Home  Current DME Used/Available at Home: Yakelin Pen, straight, Franchester Byes, rollator, Grab bars, Shower chair (uses rollator outside, uses SPC at night, SPC in community, stands for shower)  Tub or Shower Type: Shower  Critical Behavior:  Neurologic State: Alert  Orientation Level: Oriented X4  Cognition: Appropriate decision making, Appropriate for age attention/concentration, Appropriate safety awareness, Follows commands     Skin:    Strength:    Strength:  Within functional limits                    Tone & Sensation:                                 Range Of Motion:  AROM: Within functional limits           PROM: Within functional limits           Coordination:  Coordination: Generally decreased, functional (B UE tremors, L >R)    Functional Mobility:  Bed Mobility:     Supine to Sit: Independent     Scooting: Independent  Transfers:  Sit to Stand: Independent  Stand to Sit: Independent        Bed to Chair: Independent              Balance:   Sitting: Intact; Without support  Ambulation/Gait Training:   Pt ambulated 150 ft with HHA . Base was normal and there was no shuffling or LOB. Gait steady and safe with CGA. Pt also went up 2 steps with rail and S. Stairs: Up and down 2 steps with rail and supervision only              Therapeutic Exercises:   Deep breathing  Functional Measure:  Barthel 95/100 independent    Pain:  Pain Scale 1: Numeric (0 - 10)  Pain Intensity 1: 0     Activity Tolerance:   good  Please refer to the flowsheet for vital signs taken during this treatment. After treatment:   [x]   Patient left in no apparent distress sitting up in chair  []   Patient left in no apparent distress in bed  [x]   Call bell left within reach  []   Nursing notified  [x]   Caregiver present  []   Bed alarm activated    COMMUNICATION/EDUCATION:   Communication/Collaboration:  []   Fall prevention education was provided and the patient/caregiver indicated understanding. [x]   Patient/family have participated as able and agree with findings and recommendations. []   Patient is unable to participate in plan of care at this time.   Findings and recommendations were discussed with: Occupational Therapist and Registered Nurse    Thank you for this referral.  An Adames, PT

## 2023-04-26 NOTE — PROGRESS NOTES
Hospitalist Progress Note    NAME: Kevin Estrada   :  1945   MRN:  [id number]       Assessment / Plan:  Junctional bradycardia heart rates to the 30s at home and in ED POA  Hypotension at home SBP 70 per  POA  History of atrial fibrillation status post ablation POA  Essential hypertension POA  Hyperlipidemia POA   Chronic diastolic congestive heart failure POA    -Patient had episode of watery diarrhea on admission which resolved. C. difficile precautions discontinued  Heart rate was in 30s on admission, currently in 70s  Status post IV atropine and ER  TSH normal  Echo pending  Awaiting Dr. Liz Flores to see the patient    Elevated creatinine from baseline  Creatinine 1.48 on admission  Losartan held  Creatinine normalized with IV fluids    Nausea and diarrhea overnight  Severe periumbilical abdominal pain overnight  Symptoms resolved in ED, no further diarrhea  CT scan abdomen pelvis              Cirrhotic appearing liver, otherwise no abnormalities  Symptoms resolved. Patient tolerating diet likely acute gastroenteritis    Diabetes mellitus type 2 POA  Baseline on oral agents  Sliding scale insulin  Check hemoglobin A1c     Remote history of prior pulmonary embolism POA not currently on blood thinners    25.0 - 29.9 Overweight / Body mass index is 26.07 kg/m². Estimated discharge date: Tomorrow barriers: Cardiology clearance    Code status: Full  Prophylaxis: Hep SQ  Recommended Disposition: Home w/Family     Subjective:     Chief Complaint / Reason for Physician Visit  \" Blood pressure improved, bradycardia resolved. Awaiting cardiology to see the patient. Patient denies any abdominal pain nausea vomiting or diarrhea.  at bedside\". Discussed with RN events overnight.      Review of Systems:  Symptom Y/N Comments  Symptom Y/N Comments   Fever/Chills    Chest Pain     Poor Appetite    Edema     Cough    Abdominal Pain     Sputum    Joint Pain     SOB/REGALADO    Pruritis/Rash Nausea/vomit    Tolerating PT/OT     Diarrhea    Tolerating Diet     Constipation    Other       Could NOT obtain due to:      Objective:     VITALS:   Last 24hrs VS reviewed since prior progress note. Most recent are:  Patient Vitals for the past 24 hrs:   Temp Pulse Resp BP SpO2   04/26/23 1520 98.2 °F (36.8 °C) 75 18 133/76 95 %   04/26/23 1040 98.4 °F (36.9 °C) 76 18 (!) 164/61 92 %   04/26/23 0931 -- 80 -- (!) 159/70 95 %   04/26/23 0928 -- 80 -- (!) 151/66 95 %   04/26/23 0745 98.4 °F (36.9 °C) 80 18 (!) 159/72 92 %   04/26/23 0351 -- 88 18 (!) 165/63 96 %   04/26/23 0000 -- 81 -- -- --   04/25/23 2318 -- 78 18 (!) 151/53 98 %   04/25/23 2022 98.8 °F (37.1 °C) 83 18 110/73 94 %   04/25/23 2000 -- 81 -- -- --     No intake or output data in the 24 hours ending 04/26/23 1750     I had a face to face encounter and independently examined this patient on 4/26/2023, as outlined below:  PHYSICAL EXAM:  General: WD, WN. Alert, cooperative, no acute distress    EENT:  EOMI. Anicteric sclerae. MMM  Resp:  CTA bilaterally, no wheezing or rales. No accessory muscle use  CV:  Regular  rhythm,  No edema  GI:  Soft, Non distended, Non tender. +Bowel sounds  Neurologic:  Alert and oriented X 3, normal speech,   Psych:   Good insight. Not anxious nor agitated  Skin:  No rashes. No jaundice    Reviewed most current lab test results and cultures  YES  Reviewed most current radiology test results   YES  Review and summation of old records today    NO  Reviewed patient's current orders and MAR    YES  PMH/SH reviewed - no change compared to H&P  ________________________________________________________________________  Care Plan discussed with:    Comments   Patient x    Family  x    RN x    Care Manager x    Consultant                       x Multidiciplinary team rounds were held today with , nursing, pharmacist and clinical coordinator.   Patient's plan of care was discussed; medications were reviewed and discharge planning was addressed. ________________________________________________________________________  Total NON critical care TIME:  35   Minutes    Total CRITICAL CARE TIME Spent:   Minutes non procedure based      Comments   >50% of visit spent in counseling and coordination of care     ________________________________________________________________________  Faye Fournier MD     Procedures: see electronic medical records for all procedures/Xrays and details which were not copied into this note but were reviewed prior to creation of Plan. LABS:  I reviewed today's most current labs and imaging studies.   Pertinent labs include:  Recent Labs     04/26/23  0110 04/25/23  1232   WBC 6.6 6.3   HGB 11.0* 10.8*   HCT 34.1* 33.3*   * 128*     Recent Labs     04/26/23  0110 04/25/23  1232    139   K 3.8 4.2   * 109*   CO2 24 26   * 226*   BUN 22* 28*   CREA 0.98 1.48*   CA 9.2 8.4*   ALB 4.0 3.4*   TBILI 1.3* 1.2*   ALT 25 23   INR 1.1  --        Signed: Faye Fournier MD

## 2023-04-26 NOTE — PROGRESS NOTES
Pt educated on fall risk and how to use the call bell for the restroom. Pt understands but states   \" I should not have to call to go to the restroom everytime. \"

## 2023-04-26 NOTE — PROGRESS NOTES
Problem: Risk for Spread of Infection  Goal: Prevent transmission of infectious organism to others  Description: Prevent the transmission of infectious organisms to other patients, staff members, and visitors. Outcome: Progressing Towards Goal     Problem: Patient Education:  Go to Education Activity  Goal: Patient/Family Education  Outcome: Progressing Towards Goal     Problem: Falls - Risk of  Goal: *Absence of Falls  Description: Document Flor Salines Fall Risk and appropriate interventions in the flowsheet.   Outcome: Progressing Towards Goal  Note: Fall Risk Interventions:                                Problem: Patient Education: Go to Patient Education Activity  Goal: Patient/Family Education  Outcome: Progressing Towards Goal

## 2023-04-26 NOTE — PROGRESS NOTES
0700: Bedside shift change report given to [id number] (oncoming nurse) by José Miguel Lockwood RN (offgoing nurse). Report included the following information SBAR, Kardex, Intake/Output, and MAR.      1357: Message sent to MD asking if it is okay to still give scheduled 5000 units heparin injection since pt's platelets down to 860. MD approved. 1415: Enteric precautions dc'd per MD order d/t no bowel movement since 4/25.     1754: Message sent to MD requesting melatonin to help pt sleep better tonight. MD placed order. 1900: End of Shift Note    Bedside shift change report given to Shauna RYDER (oncoming nurse) by Jones Ruiz RN (offgoing nurse). Report included the following information SBAR, Kardex, Intake/Output, and MAR    Shift worked:  [id number]     Shift summary and any significant changes:     No significant changes, Jose Rafael BLACKBURN to see pt. Concerns for physician to address:  N/a     Zone phone for oncoming shift:          Activity:  Activity Level: Up with Assistance  Number times ambulated in hallways past shift: 0  Number of times OOB to chair past shift: 2    Cardiac:   Cardiac Monitoring: Yes      Cardiac Rhythm: Sinus Rhythm    Access:  Current line(s): PIV     Genitourinary:   Urinary status: voiding    Respiratory:   O2 Device: None (Room air)  Chronic home O2 use?: NO  Incentive spirometer at bedside: N/A       GI:  Last Bowel Movement Date: 04/25/23  Current diet:  ADULT DIET Regular; 4 carb choices (60 gm/meal);  Low Fat/Low Chol/High Fiber/HARSHAD  Passing flatus: YES  Tolerating current diet: YES       Pain Management:   Patient states pain is manageable on current regimen: YES    Skin:  Brady Score: 19  Interventions: increase time out of bed    Patient Safety:  Fall Score:    Interventions: pt to call before getting OOB       Length of Stay:  Expected LOS: 2d 7h  Actual LOS: 1      Jones Ruiz, RN

## 2023-04-27 LAB
GLUCOSE BLD STRIP.AUTO-MCNC: 112 MG/DL (ref 65–117)
GLUCOSE BLD STRIP.AUTO-MCNC: 159 MG/DL (ref 65–117)
GLUCOSE BLD STRIP.AUTO-MCNC: 169 MG/DL (ref 65–117)
GLUCOSE BLD STRIP.AUTO-MCNC: 229 MG/DL (ref 65–117)
GLUCOSE BLD STRIP.AUTO-MCNC: 65 MG/DL (ref 65–117)
GLUCOSE BLD STRIP.AUTO-MCNC: 92 MG/DL (ref 65–117)
SERVICE CMNT-IMP: ABNORMAL
SERVICE CMNT-IMP: NORMAL

## 2023-04-27 PROCEDURE — 74011636637 HC RX REV CODE- 636/637: Performed by: INTERNAL MEDICINE

## 2023-04-27 PROCEDURE — 74011250636 HC RX REV CODE- 250/636: Performed by: INTERNAL MEDICINE

## 2023-04-27 PROCEDURE — 74011250637 HC RX REV CODE- 250/637: Performed by: INTERNAL MEDICINE

## 2023-04-27 PROCEDURE — 74011000250 HC RX REV CODE- 250: Performed by: INTERNAL MEDICINE

## 2023-04-27 PROCEDURE — 65660000001 HC RM ICU INTERMED STEPDOWN

## 2023-04-27 PROCEDURE — 93005 ELECTROCARDIOGRAM TRACING: CPT

## 2023-04-27 PROCEDURE — 82962 GLUCOSE BLOOD TEST: CPT

## 2023-04-27 RX ORDER — LOSARTAN POTASSIUM 50 MG/1
25 TABLET ORAL DAILY
Status: DISCONTINUED | OUTPATIENT
Start: 2023-04-28 | End: 2023-04-29 | Stop reason: HOSPADM

## 2023-04-27 RX ORDER — FLECAINIDE ACETATE 100 MG/1
50 TABLET ORAL EVERY 12 HOURS
Status: DISCONTINUED | OUTPATIENT
Start: 2023-04-27 | End: 2023-04-29 | Stop reason: HOSPADM

## 2023-04-27 RX ORDER — PSEUDOEPHEDRINE HCL 30 MG
30 TABLET ORAL
Status: ACTIVE | OUTPATIENT
Start: 2023-04-27 | End: 2023-04-29

## 2023-04-27 RX ORDER — DILTIAZEM HYDROCHLORIDE 30 MG/1
30 TABLET, FILM COATED ORAL
Status: DISCONTINUED | OUTPATIENT
Start: 2023-04-27 | End: 2023-04-29 | Stop reason: HOSPADM

## 2023-04-27 RX ORDER — HEPARIN SODIUM 5000 [USP'U]/ML
5000 INJECTION, SOLUTION INTRAVENOUS; SUBCUTANEOUS EVERY 12 HOURS
Status: DISCONTINUED | OUTPATIENT
Start: 2023-04-27 | End: 2023-04-29 | Stop reason: HOSPADM

## 2023-04-27 RX ADMIN — DILTIAZEM HYDROCHLORIDE 30 MG: 30 TABLET, FILM COATED ORAL at 21:39

## 2023-04-27 RX ADMIN — BUPROPION HYDROCHLORIDE 150 MG: 150 TABLET, EXTENDED RELEASE ORAL at 18:14

## 2023-04-27 RX ADMIN — DEXTROSE MONOHYDRATE 250 ML: 100 INJECTION, SOLUTION INTRAVENOUS at 15:11

## 2023-04-27 RX ADMIN — Medication 500 MCG: at 09:21

## 2023-04-27 RX ADMIN — HEPARIN SODIUM 5000 UNITS: 5000 INJECTION INTRAVENOUS; SUBCUTANEOUS at 05:51

## 2023-04-27 RX ADMIN — HEPARIN SODIUM 5000 UNITS: 5000 INJECTION INTRAVENOUS; SUBCUTANEOUS at 18:14

## 2023-04-27 RX ADMIN — SODIUM CHLORIDE, PRESERVATIVE FREE 10 ML: 5 INJECTION INTRAVENOUS at 21:42

## 2023-04-27 RX ADMIN — MELATONIN 3 MG: at 23:43

## 2023-04-27 RX ADMIN — Medication 2 UNITS: at 12:46

## 2023-04-27 RX ADMIN — ASPIRIN 81 MG: 81 TABLET, CHEWABLE ORAL at 09:21

## 2023-04-27 RX ADMIN — BUPROPION HYDROCHLORIDE 150 MG: 150 TABLET, EXTENDED RELEASE ORAL at 09:22

## 2023-04-27 RX ADMIN — FLECAINIDE ACETATE 50 MG: 100 TABLET ORAL at 18:24

## 2023-04-27 RX ADMIN — SODIUM CHLORIDE 50 ML/HR: 9 INJECTION, SOLUTION INTRAVENOUS at 03:28

## 2023-04-27 RX ADMIN — SODIUM CHLORIDE, PRESERVATIVE FREE 10 ML: 5 INJECTION INTRAVENOUS at 15:28

## 2023-04-27 RX ADMIN — SODIUM CHLORIDE, PRESERVATIVE FREE 10 ML: 5 INJECTION INTRAVENOUS at 05:51

## 2023-04-27 RX ADMIN — HEPARIN SODIUM 5000 UNITS: 5000 INJECTION INTRAVENOUS; SUBCUTANEOUS at 12:46

## 2023-04-27 NOTE — PROGRESS NOTES
End of Shift Note    Bedside shift change report given to Kalee House RN (oncoming nurse) by Ernie Barrett RN (offgoing nurse). Report included the following information SBAR, Kardex, Intake/Output, MAR, Recent Results, and Cardiac Rhythm A-fib    Shift worked:  1900-0730     Shift summary and any significant changes:    No c/o pain over night, CHG bath completed. See flowsheet, and MAR for more information. Concerns for physician to address:       Zone phone for oncoming shift:          Activity:  Activity Level: Up with Assistance  Number times ambulated in hallways past shift: 0  Number of times OOB to chair past shift: 0    Cardiac:   Cardiac Monitoring: Yes      Cardiac Rhythm: Atrial Fib    Access:  Current line(s): PIV     Genitourinary:   Urinary status: voiding    Respiratory:   O2 Device: None (Room air)  Chronic home O2 use?: NO  Incentive spirometer at bedside: YES       GI:  Last Bowel Movement Date: 04/25/23  Current diet:  ADULT DIET Regular; 4 carb choices (60 gm/meal);  Low Fat/Low Chol/High Fiber/HARSHAD  Passing flatus: YES  Tolerating current diet: YES       Pain Management:   Patient states pain is manageable on current regimen: YES    Skin:  Brady Score: 21  Interventions: increase time out of bed and PT/OT consult    Patient Safety:  Fall Score:    Interventions: bed/chair alarm, assistive device (walker, cane, etc), gripper socks, and pt to call before getting OOB       Length of Stay:  Expected LOS: 2d 7h  Actual LOS: 2      Ernie Barrett RN

## 2023-04-27 NOTE — PROGRESS NOTES
Bedside shift change report given to Romario Cameron RN (oncoming nurse) by Barry Beckman RN (offgoing nurse). Report included the following information SBAR, Kardex, Intake/Output, MAR, and Recent Results.

## 2023-04-27 NOTE — PROGRESS NOTES
Problem: Falls - Risk of  Goal: *Absence of Falls  Description: Document Jocelyn Franco Fall Risk and appropriate interventions in the flowsheet.   Outcome: Progressing Towards Goal  Note: Fall Risk Interventions:     Problem: Patient Education: Go to Patient Education Activity  Goal: Patient/Family Education  Outcome: Progressing Towards Goal

## 2023-04-27 NOTE — PROGRESS NOTES
I will hold the losartan which would be restarted tomorrow AM since she is starting flecainide and diltiazem which are more important to get on board at this time.

## 2023-04-27 NOTE — PROGRESS NOTES
1900: Bedside and Verbal shift change report given to Nelson Weiner RN (oncoming nurse) by Layne Lao (offgoing nurse). Report included the following information SBAR, Kardex, Intake/Output, MAR, Recent Results, and Cardiac Rhythm A-fib .      1900: Pt in A-fib, asymptomatic. Accessed for pain, Pt denies pain, no chest pain. Day RN report Pt was in Normal sinus Rhythm throughout the day. VS WDL. 2000: At around [place], [place] [employer] came by the PCU unit, was made aware of verbal report that Pt has HX of A-fib, came with low HR and is in A-fib rhythm. PA Okay with current HR. Will continue to monitor.

## 2023-04-27 NOTE — PROGRESS NOTES
Problem: Risk for Spread of Infection  Goal: Prevent transmission of infectious organism to others  Description: Prevent the transmission of infectious organisms to other patients, staff members, and visitors. Outcome: Progressing Towards Goal     Problem: Patient Education:  Go to Education Activity  Goal: Patient/Family Education  Outcome: Progressing Towards Goal     Problem: Falls - Risk of  Goal: *Absence of Falls  Description: Document Ailyn Arreaga Fall Risk and appropriate interventions in the flowsheet.   Outcome: Progressing Towards Goal  Note: Fall Risk Interventions:

## 2023-04-27 NOTE — CONSULTS
EP/ ARRHYTHMIA CONSULT    Patient ID:  Patient: Mariaa Omer  MRN: [id number]  Age: [de-identified] y.o.  : 1945    Date of  Admission: 2023 12:12 PM   PCP:  Lucina Antonio NP  Usual cardiologist:  Joshua Fish MD    Assessment:   Frequent PAC's, atrial runs. Can't exclude Afib due to low amplitude atrial electrograms. Sinus node dysfunction and hypotension, both acquired (medication excess +/- high vagal tone with abdominal distress). Resolved. Persistent atrial fibrillation in the past, s/p Afib ablation in 2021 (PVI, LA roof, ant mitral, CTI lines using RF). Abdominal discomfort, diarrhea, resolved. LGIB in 2022, requiring RBC transfusions. DM type 2. Hypertension. Remote pulmonary embolism. Mild thrombocytopenia. Mild anemia. Full code. Plan:     ECG now. See if this is sinus with frequent atrial ectopy. Start flecainide 50 mg po BID. We discussed the pro's and con's of anticoagulation given her history. I will NOT start anticoagulation due to bleeding risk. OK to stop baby ASA daily weighing risk and benefit. Continue heparin DVT prophylaxis while here. Restart diltiazem IR 30 mg po QID with hold parameters. May be discharged on 120 mg daily of the CD formulation if this dosing works. Avoid Bystolic at this time. See how she does on this regimen overnight and into tomorrow. I initially recommended anticoagulation but then came across info from 2022 admission with GI bleeding. [x]       High complexity decision making was performed in this patient    Mariaa Omer is a [de-identified] y.o. female with a history of bradycardia and hypotension, altered mental status after several days of diarrhea. She had abdominal pain on the day she came to the hospital.  Here, given IVF. Her Bystolic and diltiazem were held. She improved. Currently, her HR's are normal, even elevated at times. She denies chest pain, syncope, dizziness, palpitations here.   No orthopnea, PND, or edema. No TIA or stroke symptoms otherwise.       Past Medical History:   Diagnosis Date    Arrhythmia     AFIB    Arthritis     Asthma     Atrial fibrillation (HCC)     CAD (coronary artery disease)     Diabetes ([address]     Heart failure (HCC)     Hepatitis     AGE 8     High cholesterol     Hypertension     Other ill-defined conditions(799.89)     heart palpitations    Psychiatric disorder     depression    PUD (peptic ulcer disease)     S/P ablation of atrial fibrillation 1/28/2021 1/28/2021 PVI Afib ablation    Spinal stenosis     Thromboembolus ([address]     PULMONARY EMBOLIS IN BOTH LUNGS    Vitreous detachment of left eye     BLIND IN LEFT EYE        Past Surgical History:   Procedure Laterality Date    COLONOSCOPY N/A 4/3/2019    COLONOSCOPY performed by Miguel Zacarias MD at [address]; HI RISK IND  4/3/2019         HX CATARACT REMOVAL Bilateral     HX HYSTERECTOMY      HX KNEE REPLACEMENT Right     unicodylar knee replacement    HX OTHER SURGICAL      foot left    HX TONSILLECTOMY      VA ABLATE L/R ATRIAL FIBRIL W/ISOLATED PULM VEIN N/A 1/28/2021    Ablation Following A-Fib  Addl performed by Mervat Owen MD at [facility]    VA COMPRE ELECTROPHYSIOL XM W/LEFT ATRIAL PACNG/REC N/A 1/28/2021    Lt Atrial Pace & Record During Ep Study performed by Mervat Owen MD at [facility]    [address] ISOLATION N/A 1/28/2021    ABLATION A-FIB  W COMPLETE EP STUDY performed by Mervat Owen MD at [facility]    VA ICAR CATHETER ABLATION ARRHYTHMIA ADD ON N/A 1/28/2021    Ablation Svt/Vt Add On performed by Mervat Owen MD at [facility]    VA INTRACARD [facility] W/THER/DX IVNTJ INCL IMG S&I N/A 1/28/2021    Intracardiac Echocardiogram performed by Mervat Owen MD at [facility]    VA INTRACARDIAC ELECTROPHYSIOLOGIC 3D MAPPING N/A 1/28/2021    Ep 3d Mapping performed by Laura Chin MD at [facility]    UPPER GI ENDOSCOPY,BIOPSY  9/24/2020            Social History     Tobacco Use    Smoking status: Never    Smokeless tobacco: Never   Substance Use Topics    Alcohol use: No        Family History   Problem Relation Age of Onset    Cancer Father     Stroke Father     Heart Disease Paternal Grandfather     Heart Failure Paternal Grandfather     Stroke Paternal Grandfather         Allergies   Allergen Reactions    Baclofen Other (comments)     Hallucinations    Albuterol Palpitations    Bactrim [Sulfamethoxazole-Trimethoprim] Not Reported This Time    Diuretic Softgels [Pamabrom] Other (comments)     Lowers potassium, very sensitive      Iodine Unknown (comments)    Lipitor [Atorvastatin] Myalgia    Shellfish Containing Products Unknown (comments)     \"got choked, It might have been the spices\"    Tizanidine Unknown (comments)          Current Facility-Administered Medications   Medication Dose Route Frequency    pseudoephedrine (SUDAFED) tablet 30 mg  30 mg Oral Q4H PRN    morphine injection 2 mg  2 mg IntraVENous Q4H PRN    melatonin tablet 3 mg  3 mg Oral QHS PRN    hydrALAZINE (APRESOLINE) 20 mg/mL injection 10 mg  10 mg IntraVENous Q6H PRN    guaiFENesin (ROBITUSSIN) 100 mg/5 mL oral liquid 100 mg  100 mg Oral Q4H PRN    sodium chloride (NS) flush 5-40 mL  5-40 mL IntraVENous Q8H    sodium chloride (NS) flush 5-40 mL  5-40 mL IntraVENous PRN    acetaminophen (TYLENOL) tablet 650 mg  650 mg Oral Q6H PRN    Or    acetaminophen (TYLENOL) suppository 650 mg  650 mg Rectal Q6H PRN    promethazine (PHENERGAN) tablet 12.5 mg  12.5 mg Oral Q6H PRN    Or    ondansetron (ZOFRAN) injection 4 mg  4 mg IntraVENous Q6H PRN    bisacodyL (DULCOLAX) tablet 10 mg  10 mg Oral DAILY PRN    [Held by provider] 0.9% sodium chloride infusion  50 mL/hr IntraVENous CONTINUOUS    glucose chewable tablet 16 g  4 Tablet Oral PRN    glucagon (GLUCAGEN) injection 1 mg  1 mg IntraMUSCular PRN    dextrose 10% infusion 0-250 mL  0-250 mL IntraVENous PRN    insulin lispro (HUMALOG) injection   SubCUTAneous AC&HS    buPROPion SR (WELLBUTRIN SR) tablet 150 mg  150 mg Oral BID    levalbuterol (XOPENEX) nebulizer soln 0.63 mg/3 mL  0.63 mg Nebulization Q4H PRN    hydrALAZINE (APRESOLINE) 20 mg/mL injection 20 mg  20 mg IntraVENous Q6H PRN    cyanocobalamin (VITAMIN B12) tablet 500 mcg  500 mcg Oral DAILY    aspirin chewable tablet 81 mg  81 mg Oral DAILY    heparin (porcine) injection 5,000 Units  5,000 Units SubCUTAneous Q8H    atropine 1 mg/mL injection 1 mg  1 mg IntraVENous PRN       Review of Symptoms:  See H&P, otherwise noncontributory across 12 body systems. Objective:      Physical Exam:  Temp (24hrs), Av.6 °F (37 °C), Min:98 °F (36.7 °C), Max:99.1 °F (37.3 °C)    Patient Vitals for the past 8 hrs:   Pulse   23 1117 87    Patient Vitals for the past 8 hrs:   Resp   23 1117 18    Patient Vitals for the past 8 hrs:   BP   23 1117 130/64   23 0920 134/65        Intake/Output Summary (Last 24 hours) at 2023 1405  Last data filed at 2023 2313  Gross per 24 hour   Intake 200 ml   Output --   Net 200 ml       Nondiaphoretic, not in acute distress. No scleral icterus, mucous membranes moist, conjuctivae pink, no xanthelasma. Unlabored, clear to auscultation bilaterally, symmetric air movement. Irregular rate and rhythm, no murmur, pericardial rub, knock, or gallop. Palpable radial pulses bilaterally. Abdomen, soft, nontender, nondistended. Extremities without cyanosis or clubbing. Muscle tone and bulk normal for age. No edema. Skin warm and dry. No rashes or ulcers. Neuro grossly nonfocal.  No tremor. Awake and appropriate. CARDIOGRAPHICS and STUDIES, I reviewed:    Telemetry:  SR with frequent PAC's, atrial runs. Cannot exclude atrial fibrillation. ECG:  Pending.     Labs:  No results for input(s): CPK, CKMB, CKNDX, TROIQ in the last 72 hours. No lab exists for component: CPKMB  Lab Results   Component Value Date/Time    Cholesterol, total 239 (H) 04/14/2010 03:50 AM    HDL Cholesterol 33 04/14/2010 03:50 AM    LDL, calculated 170.8 (H) 04/14/2010 03:50 AM    Triglyceride 176 04/14/2010 03:50 AM    CHOL/HDL Ratio 7.2 (H) 04/14/2010 03:50 AM     Recent Labs     04/26/23  0110   INR 1.1   PTP 11.0      Recent Labs     04/26/23  0110 04/25/23  1232    139   K 3.8 4.2   * 109*   CO2 24 26   BUN 22* 28*   CREA 0.98 1.48*   * 226*   CA 9.2 8.4*   ALB 4.0 3.4*   WBC 6.6 6.3   HGB 11.0* 10.8*   HCT 34.1* 33.3*   * 128*     Recent Labs     04/26/23  0110 04/25/23  1232   AP 70 62   TP 6.7 6.1*   ALB 4.0 3.4*   GLOB 2.7 2.7   LPSE  --  208     No components found for: GLPOC  No results for input(s): PH, PCO2, PO2 in the last 72 hours.         Oscar Zavala MD  4/27/2023

## 2023-04-27 NOTE — PROGRESS NOTES
Transition of Care Plan:     RUR: 7% \"low risk\"  Disposition: Home with follow up apts  Follow up appointments: PCP & Specialists scheduled  DME needed: Has a rollator and cane at home  Transportation at Discharge: Pt's spouse will provide transport at d/c  [place] or means to access home: Pt has access to home      IM Medicare Letter: 2nd IM letter needed at d/c  Is patient a [place] and connected with the [place]? N/A  Caregiver Contact: Pt's spouse Jeremy Pena [phone]  Discharge Caregiver contacted prior to discharge? Per pt's request  Care Conference needed?: Not at this time    Initial note: Chart reviewed for updates. It was discussed in IDR that pt is discharging today. PCP appointment has been secured for pt. Pt has no PT/OT needs identified at this time. Pt is pending cardiology clearance. CM to meet with pt at bedside to discuss d/c planning. Care Management Interventions  PCP Verified by CM: Yes  Palliative Care Criteria Met (RRAT>21 & CHF Dx)?: No  Mode of Transport at Discharge:  Other (see comment) (Spouse to provide transport at d/c)  Transition of Care Consult (CM Consult): Discharge Planning  Discharge Durable Medical Equipment: No  Health Maintenance Reviewed:  (Has DME at home)  Physical Therapy Consult: No  Occupational Therapy Consult: No  Speech Therapy Consult: No  Support Systems: Spouse/Significant Other, Child(fay) (Pt's spouse and 2 sons are her main support system)  Confirm Follow Up Transport: Family (Spouse to provide transport at d/c)  The Plan for Transition of Care is Related to the Following Treatment Goals : Pt is d/c home with family and follow up appts  Discharge Location  Patient Expects to be Discharged to[de-identified] Home with family assistance (Pt is d/c home with family and follow up appts)     ALMITA Aguila    [phone]

## 2023-04-27 NOTE — PROGRESS NOTES
Hospital follow-up PCP transitional care appointment has been scheduled with NP Ahmet Oconnell on 5/5/23 at 1300. This is the first available appt due to limited provider availability. Pending patient discharge.  Baldemar Peres, Care Management Assistant

## 2023-04-27 NOTE — PROGRESS NOTES
Hospitalist Progress Note    NAME: Diana Oswald   :  1945   MRN:  [id number]       Assessment / Plan:  Junctional bradycardia heart rates to the 30s at home and in ED POA  Hypotension at home SBP 70 per  POA  History of atrial fibrillation status post ablation POA  Essential hypertension POA  Hyperlipidemia POA   Chronic diastolic congestive heart failure POA    -Patient had episode of watery diarrhea on admission which resolved. C. difficile precautions discontinued  Heart rate was in 30s on admission, currently in 70s  Status post IV atropine and ER  TSH normal  Echo pending  Discussed with Dr. Carly Hinds who will see the patient  Cardizem, Bystolic on hold. Patient euvolemic. Lasix on hold  Losartan resumed    Elevated creatinine from baseline  Creatinine 1.48 on admission  Creatinine normalized    Nausea and diarrhea overnight  Severe periumbilical abdominal pain overnight  Symptoms resolved in ED, no further diarrhea  CT scan abdomen pelvis              Cirrhotic appearing liver, otherwise no abnormalities  Symptoms resolved. Patient tolerating diet likely acute gastroenteritis    Diabetes mellitus type 2 POA  Baseline on oral agents  Sliding scale insulin  Check hemoglobin A1c  Regarding blood sugar between 65-2 29. Continue sliding scale         Remote history of prior pulmonary embolism POA not currently on blood thinners    25.0 - 29.9 Overweight / Body mass index is 23.65 kg/m². Estimated discharge date: Tomorrow barriers: Cardiology clearance    Code status: Full  Prophylaxis: Hep SQ  Recommended Disposition: Home w/Family     Subjective:     Chief Complaint / Reason for Physician Visit  \" Blood pressure improved, bradycardia resolved. Awaiting cardiology to see the patient. Patient denies any abdominal pain nausea vomiting or diarrhea.  at bedside\". Discussed with RN events overnight.      Review of Systems:  Symptom Y/N Comments  Symptom Y/N Comments   Fever/Chills Chest Pain     Poor Appetite    Edema     Cough    Abdominal Pain     Sputum    Joint Pain     SOB/REGALADO    Pruritis/Rash     Nausea/vomit    Tolerating PT/OT     Diarrhea    Tolerating Diet     Constipation    Other       Could NOT obtain due to:      Objective:     VITALS:   Last 24hrs VS reviewed since prior progress note. Most recent are:  Patient Vitals for the past 24 hrs:   Temp Pulse Resp BP SpO2   04/27/23 1459 98.2 °F (36.8 °C) 85 20 (!) 158/80 94 %   04/27/23 1117 98.4 °F (36.9 °C) 87 18 130/64 96 %   04/27/23 0920 98 °F (36.7 °C) -- -- 134/65 --   04/27/23 0752 98.1 °F (36.7 °C) 91 18 126/86 96 %   04/27/23 0327 99.1 °F (37.3 °C) 90 18 (!) 132/92 94 %   04/26/23 2313 98.8 °F (37.1 °C) 85 17 132/64 93 %   04/26/23 1938 98.8 °F (37.1 °C) 75 18 (!) 143/61 95 %         Intake/Output Summary (Last 24 hours) at 4/27/2023 1615  Last data filed at 4/26/2023 2313  Gross per 24 hour   Intake 200 ml   Output --   Net 200 ml        I had a face to face encounter and independently examined this patient on 4/27/2023, as outlined below:  PHYSICAL EXAM:  General: WD, WN. Alert, cooperative, no acute distress    EENT:  EOMI. Anicteric sclerae. MMM  Resp:  CTA bilaterally, no wheezing or rales. No accessory muscle use  CV:  Regular  rhythm,  No edema  GI:  Soft, Non distended, Non tender. +Bowel sounds  Neurologic:  Alert and oriented X 3, normal speech,   Psych:   Good insight. Not anxious nor agitated  Skin:  No rashes.   No jaundice    Reviewed most current lab test results and cultures  YES  Reviewed most current radiology test results   YES  Review and summation of old records today    NO  Reviewed patient's current orders and MAR    YES  PMH/SH reviewed - no change compared to H&P  ________________________________________________________________________  Care Plan discussed with:    Comments   Patient x    Family  x    RN x    Care Manager x    Consultant                       x Multidiciplinary team rounds were held today with , nursing, pharmacist and clinical coordinator. Patient's plan of care was discussed; medications were reviewed and discharge planning was addressed. ________________________________________________________________________  Total NON critical care TIME:  35   Minutes    Total CRITICAL CARE TIME Spent:   Minutes non procedure based      Comments   >50% of visit spent in counseling and coordination of care     ________________________________________________________________________  Toni Garcia MD     Procedures: see electronic medical records for all procedures/Xrays and details which were not copied into this note but were reviewed prior to creation of Plan. LABS:  I reviewed today's most current labs and imaging studies.   Pertinent labs include:  Recent Labs     04/26/23  0110 04/25/23  1232   WBC 6.6 6.3   HGB 11.0* 10.8*   HCT 34.1* 33.3*   * 128*       Recent Labs     04/26/23  0110 04/25/23  1232    139   K 3.8 4.2   * 109*   CO2 24 26   * 226*   BUN 22* 28*   CREA 0.98 1.48*   CA 9.2 8.4*   ALB 4.0 3.4*   TBILI 1.3* 1.2*   ALT 25 23   INR 1.1  --          Signed: Toni Garcia MD

## 2023-04-28 LAB
CALCULATED R AXIS, ECG10: 140 DEGREES
CALCULATED T AXIS, ECG11: 15 DEGREES
DIAGNOSIS, 93000: NORMAL
GLUCOSE BLD STRIP.AUTO-MCNC: 104 MG/DL (ref 65–117)
GLUCOSE BLD STRIP.AUTO-MCNC: 159 MG/DL (ref 65–117)
GLUCOSE BLD STRIP.AUTO-MCNC: 224 MG/DL (ref 65–117)
GLUCOSE BLD STRIP.AUTO-MCNC: 68 MG/DL (ref 65–117)
GLUCOSE BLD STRIP.AUTO-MCNC: 98 MG/DL (ref 65–117)
Q-T INTERVAL, ECG07: 334 MS
QRS DURATION, ECG06: 90 MS
QTC CALCULATION (BEZET), ECG08: 455 MS
SERVICE CMNT-IMP: ABNORMAL
SERVICE CMNT-IMP: ABNORMAL
SERVICE CMNT-IMP: NORMAL
VENTRICULAR RATE, ECG03: 112 BPM

## 2023-04-28 PROCEDURE — 74011250636 HC RX REV CODE- 250/636: Performed by: INTERNAL MEDICINE

## 2023-04-28 PROCEDURE — 74011636637 HC RX REV CODE- 636/637: Performed by: INTERNAL MEDICINE

## 2023-04-28 PROCEDURE — 82962 GLUCOSE BLOOD TEST: CPT

## 2023-04-28 PROCEDURE — 74011250637 HC RX REV CODE- 250/637: Performed by: INTERNAL MEDICINE

## 2023-04-28 PROCEDURE — 74011000250 HC RX REV CODE- 250: Performed by: INTERNAL MEDICINE

## 2023-04-28 PROCEDURE — 65660000001 HC RM ICU INTERMED STEPDOWN

## 2023-04-28 RX ORDER — POTASSIUM CHLORIDE 750 MG/1
20 TABLET, FILM COATED, EXTENDED RELEASE ORAL
Status: COMPLETED | OUTPATIENT
Start: 2023-04-28 | End: 2023-04-28

## 2023-04-28 RX ADMIN — SODIUM CHLORIDE, PRESERVATIVE FREE 10 ML: 5 INJECTION INTRAVENOUS at 21:19

## 2023-04-28 RX ADMIN — HEPARIN SODIUM 5000 UNITS: 5000 INJECTION INTRAVENOUS; SUBCUTANEOUS at 06:33

## 2023-04-28 RX ADMIN — Medication 500 MCG: at 09:19

## 2023-04-28 RX ADMIN — GUAIFENESIN 100 MG: 200 SOLUTION ORAL at 09:40

## 2023-04-28 RX ADMIN — BUPROPION HYDROCHLORIDE 150 MG: 150 TABLET, EXTENDED RELEASE ORAL at 18:43

## 2023-04-28 RX ADMIN — FLECAINIDE ACETATE 50 MG: 100 TABLET ORAL at 21:16

## 2023-04-28 RX ADMIN — FLECAINIDE ACETATE 50 MG: 100 TABLET ORAL at 09:17

## 2023-04-28 RX ADMIN — DILTIAZEM HYDROCHLORIDE 30 MG: 30 TABLET, FILM COATED ORAL at 09:19

## 2023-04-28 RX ADMIN — POTASSIUM CHLORIDE 20 MEQ: 750 TABLET, FILM COATED, EXTENDED RELEASE ORAL at 15:26

## 2023-04-28 RX ADMIN — HEPARIN SODIUM 5000 UNITS: 5000 INJECTION INTRAVENOUS; SUBCUTANEOUS at 18:43

## 2023-04-28 RX ADMIN — BUPROPION HYDROCHLORIDE 150 MG: 150 TABLET, EXTENDED RELEASE ORAL at 09:19

## 2023-04-28 RX ADMIN — GUAIFENESIN 100 MG: 200 SOLUTION ORAL at 15:26

## 2023-04-28 RX ADMIN — ACETAMINOPHEN 650 MG: 325 TABLET ORAL at 22:52

## 2023-04-28 RX ADMIN — SODIUM CHLORIDE, PRESERVATIVE FREE 10 ML: 5 INJECTION INTRAVENOUS at 06:33

## 2023-04-28 RX ADMIN — SODIUM CHLORIDE, PRESERVATIVE FREE 10 ML: 5 INJECTION INTRAVENOUS at 15:26

## 2023-04-28 RX ADMIN — Medication 3 UNITS: at 09:17

## 2023-04-28 RX ADMIN — MELATONIN 3 MG: at 21:16

## 2023-04-28 NOTE — PROGRESS NOTES
Problem: Risk for Spread of Infection  Goal: Prevent transmission of infectious organism to others  Description: Prevent the transmission of infectious organisms to other patients, staff members, and visitors. 4/28/2023 1601 by Estrella Loo RN  Outcome: Progressing Towards Goal  4/28/2023 1212 by Estrella Loo RN  Outcome: Progressing Towards Goal     Problem: Patient Education:  Go to Education Activity  Goal: Patient/Family Education  4/28/2023 1601 by Estrella Loo RN  Outcome: Progressing Towards Goal  4/28/2023 1212 by Estrella Loo RN  Outcome: Progressing Towards Goal     Problem: Falls - Risk of  Goal: *Absence of Falls  Description: Document [facility] Fall Risk and appropriate interventions in the flowsheet.   4/28/2023 1601 by Estrella Loo RN  Outcome: Progressing Towards Goal  Note: Fall Risk Interventions:                             4/28/2023 1212 by Estrella Loo RN  Outcome: Progressing Towards Goal  Note: Fall Risk Interventions:

## 2023-04-28 NOTE — PROGRESS NOTES
End of Shift Note    Bedside shift change report given to Kai Kenney RN (oncoming nurse) by Fer Moncada (offgoing nurse). Report included the following information SBAR, Kardex, Intake/Output, and Recent Results    Shift worked:  0700 to 1900     Shift summary and any significant changes:    Pt had a blood sugar of 65 and Patient needed a stat EKG per Cardology     Concerns for physician to address: Stabilizing blood sugar due to sugars dropping when giving insulin     Zone phone for oncoming shift:  None       Activity:  Activity Level: Up with Assistance  Number times ambulated in hallways past shift: 0  Number of times OOB to chair past shift: 3    Cardiac:   Cardiac Monitoring: Yes      Cardiac Rhythm: Sinus Rhythm    Access:  Current line(s): PIV     Genitourinary:   Urinary status: voiding    Respiratory:   O2 Device: None (Room air)  Chronic home O2 use?: NO  Incentive spirometer at bedside: N/A       GI:  Last Bowel Movement Date: 04/27/23  Current diet:  ADULT DIET Regular; 4 carb choices (60 gm/meal);  Low Fat/Low Chol/High Fiber/HARSHAD  Passing flatus: YES  Tolerating current diet: YES       Pain Management:   Patient states pain is manageable on current regimen: YES    Skin:  Brady Score: 21  Interventions: increase time out of bed and PT/OT consult    Patient Safety:  Fall Score:    Interventions: bed/chair alarm and gripper socks       Length of Stay:  Expected LOS: 2d 7h  Actual LOS: [address] [facility]

## 2023-04-28 NOTE — PROGRESS NOTES
0700 Bedside and Verbal shift change report given to Bobby Orellana (oncoming nurse) by Divine Albert RN (offgoing nurse). Report included the following information SBAR, Kardex, Intake/Output, MAR, Recent Results, and Cardiac Rhythm A-fib . Family at bedside, pt calm, VS WNL.

## 2023-04-28 NOTE — PROGRESS NOTES
1900: Bedside and Verbal shift change report given to BRITNI Betancur  (oncoming nurse) by Babatunde Bhakta RN (offgoing nurse). Report included the following information SBAR, Kardex, Intake/Output, MAR, Recent Results, and Cardiac Rhythm A-fib . Pt, Pt family, and visitor calm, VS WDL. Care Plan  Problem: Falls - Risk of  Goal: *Absence of Falls  Description: Document Jocelyn Franco Fall Risk and appropriate interventions in the flowsheet.   Outcome: Progressing Towards Goal  Note: Fall Risk Interventions:      Problem: Patient Education: Go to Patient Education Activity  Goal: Patient/Family Education  Outcome: Progressing Towards Goal

## 2023-04-28 NOTE — CONSULTS
EP/ ARRHYTHMIA Progress Note    Patient ID:  Patient: Manju aDlton  MRN: [id number]  Age: [de-identified] y.o.  : 1945    Date of  Admission: 2023 12:12 PM   PCP:  Savannah Huggins NP  Usual cardiologist:  David Marcano MD    Assessment:   Frequent PAC's, atrial runs, and atrial tachycardia. Sinus node dysfunction and hypotension, seems acquired (medication excess +/- high vagal tone with abdominal distress). Resolved. Persistent atrial fibrillation in the past, s/p Afib ablation in 2021 (PVI, LA roof, ant mitral, CTI lines using RF). Abdominal discomfort, diarrhea, resolved. LGIB in 2022, requiring RBC transfusions. DM type 2. Hypertension. Remote pulmonary embolism. Mild thrombocytopenia. Mild anemia. Full code. Plan:     ECG done  shows atrial tachycardia. There are definite P waves. On tele she's had intermittent fixed coupled beats. Started flecainide 50 mg po BID, continue with this dose. We discussed the pro's and con's of anticoagulation given her history. I will NOT start anticoagulation due to bleeding risk. OK to stop baby ASA daily weighing risk and benefit. Continue heparin DVT prophylaxis while here. Stop diltiazem (tried to restart here but hypotension limited this after one dose). Stop Bystolic (she didn't get it here). Answered all her questions. She'll be here overnight to see that she holds her BP and her resting HR is ~100 or less. No pacemaker recommended at this time. Discussed with Dr. Sara Conti. [x]       High complexity decision making was performed in this patient    Manju Dalton is a [de-identified] y.o. female with a history of bradycardia and hypotension, altered mental status after several days of diarrhea. She had abdominal pain on the day she came to the hospital.  Here, given IVF. Her Bystolic and diltiazem were held. She improved. Attempted to add back low dose diltiazem but her BP is too low to do so.   Stopped after one dose. She was asymptomatic. TODAY:  She denies chest pain, syncope, dizziness, palpitations here. No orthopnea, PND, or edema. No TIA or stroke symptoms otherwise.       Allergies   Allergen Reactions    Baclofen Other (comments)     Hallucinations    Albuterol Palpitations    Bactrim [Sulfamethoxazole-Trimethoprim] Not Reported This Time    Diuretic Softgels [Pamabrom] Other (comments)     Lowers potassium, very sensitive      Iodine Unknown (comments)    Lipitor [Atorvastatin] Myalgia    Shellfish Containing Products Unknown (comments)     \"got choked, It might have been the spices\"    Tizanidine Unknown (comments)          Current Facility-Administered Medications   Medication Dose Route Frequency    pseudoephedrine (SUDAFED) tablet 30 mg  30 mg Oral Q4H PRN    [Held by provider] losartan (COZAAR) tablet 25 mg  25 mg Oral DAILY    flecainide (TAMBOCOR) tablet 50 mg  50 mg Oral Q12H    [Held by provider] dilTIAZem IR (CARDIZEM) tablet 30 mg  30 mg Oral AC&HS    heparin (porcine) injection 5,000 Units  5,000 Units SubCUTAneous Q12H    morphine injection 2 mg  2 mg IntraVENous Q4H PRN    melatonin tablet 3 mg  3 mg Oral QHS PRN    hydrALAZINE (APRESOLINE) 20 mg/mL injection 10 mg  10 mg IntraVENous Q6H PRN    guaiFENesin (ROBITUSSIN) 100 mg/5 mL oral liquid 100 mg  100 mg Oral Q4H PRN    sodium chloride (NS) flush 5-40 mL  5-40 mL IntraVENous Q8H    sodium chloride (NS) flush 5-40 mL  5-40 mL IntraVENous PRN    acetaminophen (TYLENOL) tablet 650 mg  650 mg Oral Q6H PRN    Or    acetaminophen (TYLENOL) suppository 650 mg  650 mg Rectal Q6H PRN    promethazine (PHENERGAN) tablet 12.5 mg  12.5 mg Oral Q6H PRN    Or    ondansetron (ZOFRAN) injection 4 mg  4 mg IntraVENous Q6H PRN    bisacodyL (DULCOLAX) tablet 10 mg  10 mg Oral DAILY PRN    [Held by provider] 0.9% sodium chloride infusion  50 mL/hr IntraVENous CONTINUOUS    glucose chewable tablet 16 g  4 Tablet Oral PRN    glucagon (GLUCAGEN) injection 1 mg  1 mg IntraMUSCular PRN    dextrose 10% infusion 0-250 mL  0-250 mL IntraVENous PRN    insulin lispro (HUMALOG) injection   SubCUTAneous AC&HS    buPROPion SR (WELLBUTRIN SR) tablet 150 mg  150 mg Oral BID    levalbuterol (XOPENEX) nebulizer soln 0.63 mg/3 mL  0.63 mg Nebulization Q4H PRN    hydrALAZINE (APRESOLINE) 20 mg/mL injection 20 mg  20 mg IntraVENous Q6H PRN    cyanocobalamin (VITAMIN B12) tablet 500 mcg  500 mcg Oral DAILY    atropine 1 mg/mL injection 1 mg  1 mg IntraVENous PRN       Review of Symptoms:  See H&P, otherwise noncontributory across 12 body systems. Objective:      Physical Exam:  Temp (24hrs), Av °F (36.7 °C), Min:97.7 °F (36.5 °C), Max:98.3 °F (36.8 °C)    Patient Vitals for the past 8 hrs:   Pulse   23 1646 97   23 1139 81   23 1130 93   23 1117 81   23 0917 78      Patient Vitals for the past 8 hrs:   Resp   23 1139 16   23 1117 18      Patient Vitals for the past 8 hrs:   BP   23 1646 125/63   23 1139 (!) 98/48   23 1130 (!) 98/48   23 1117 (!) 83/56   23 0917 116/66        No intake or output data in the 24 hours ending 23 1649      Nondiaphoretic, not in acute distress. No scleral icterus, mucous membranes moist, conjuctivae pink, no xanthelasma. Unlabored, clear to auscultation bilaterally, symmetric air movement. Irregular rate and rhythm, no murmur, pericardial rub, knock, or gallop. Palpable radial pulses bilaterally. Abdomen, soft, nontender, nondistended. Extremities without cyanosis or clubbing. Muscle tone and bulk normal for age. No edema. Skin warm and dry. No rashes or ulcers. Neuro grossly nonfocal.  No tremor. Awake and appropriate. CARDIOGRAPHICS and STUDIES, I reviewed:    Telemetry:  SR with frequent PAC's, atrial runs (atrial tachycardia)    Labs:  No results for input(s): CPK, CKMB, CKNDX, TROIQ in the last 72 hours.     No lab exists for component: CPKMB  Lab Results Component Value Date/Time    Cholesterol, total 239 (H) 04/14/2010 03:50 AM    HDL Cholesterol 33 04/14/2010 03:50 AM    LDL, calculated 170.8 (H) 04/14/2010 03:50 AM    Triglyceride 176 04/14/2010 03:50 AM    CHOL/HDL Ratio 7.2 (H) 04/14/2010 03:50 AM     Recent Labs     04/26/23  0110   INR 1.1   PTP 11.0        Recent Labs     04/26/23  0110      K 3.8   *   CO2 24   BUN 22*   CREA 0.98   *   CA 9.2   ALB 4.0   WBC 6.6   HGB 11.0*   HCT 34.1*   *       Recent Labs     04/26/23  0110   AP 70   TP 6.7   ALB 4.0   GLOB 2.7       No components found for: GLPOC  No results for input(s): PH, PCO2, PO2 in the last 72 hours.         Genie Scott MD  4/28/2023

## 2023-04-28 NOTE — PROGRESS NOTES
No further CM needs identified at this time. Transition of Care Plan:     RUR: 7% \"low risk\"  Disposition: Home with follow up apts  Follow up appointments: PCP & Specialists scheduled  DME needed: Has a rollator and cane at home  Transportation at Discharge: Pt's spouse will provide transport at d/c  [place] or means to access home: Pt has access to home      IM Medicare Letter: 2nd IM letter given; signed copy on chart  Is patient a [place] and connected with the [facility] [address] [facility]? N/A  Caregiver Contact: Pt's spouse Jennie Okeefe [phone]  Discharge Caregiver contacted prior to discharge?  at [place] [id number] needed?: Not at this time    Initial note: Chart reviewed for updates. CM met with pt and spouse Jennie Okeefe [phone] at bedside, introduced role and discussed d/c planning. Pt and spouse are agreeable with d/c planning. 2nd IM letter given; signed copy on chart. No PT/OT needs identified. Pt has DME at home. Spouse will provide transport at d/c. Pt is still pending Cardiology clearance. No additional questions/concerns reported by pt. PCP appointment was scheduled by the assistant CM. CM will continue to remain accessible for consult incase additional CM needs arise prior d/c. Care Management Interventions  PCP Verified by CM: Yes  Palliative Care Criteria Met (RRAT>21 & CHF Dx)?: No  Mode of Transport at Discharge:  Other (see comment) (Spouse will provide transport at d/c)  Transition of Care Consult (CM Consult): Discharge Planning  Discharge Durable Medical Equipment: No  Health Maintenance Reviewed:  (Pt has DME at home)  Physical Therapy Consult: Yes  Occupational Therapy Consult: Yes  Speech Therapy Consult: Yes  Support Systems: Spouse/Significant Other (Pt's spouse is her main support system)  Confirm Follow Up Transport: Family (Spouse will provide transport at d/c)  The Plan for Transition of Care is Related to the Following Treatment Goals : Home with spouse and follow up appts  Discharge Location  Patient Expects to be Discharged to[de-identified] Home with family assistance (Pt is d/c Home with spouse and follow up appts)     ALMITA Chao    [phone]

## 2023-04-28 NOTE — PROGRESS NOTES
End of Shift Note    Bedside shift change report given to Dwayne Angeles RN (oncoming nurse) by Jennie Frank RN (offgoing nurse). Report included the following information SBAR, Kardex, Intake/Output, MAR, Recent Results, and Cardiac Rhythm A-fib. Shift worked:  [id number]     Shift summary and any significant changes:     No pain issues over night, Pt given melatonin for sleep. See flowsheet and MAR for information. Concerns for physician to address:       Zone phone for oncoming shift:          Activity:  Activity Level: Bath Room Privileges  Number times ambulated in hallways past shift: 0  Number of times OOB to chair past shift: 0    Cardiac:   Cardiac Monitoring: Yes      Cardiac Rhythm: Atrial Fib    Access:  Current line(s): PIV     Genitourinary:   Urinary status: voiding    Respiratory:   O2 Device: None (Room air)  Chronic home O2 use?: NO  Incentive spirometer at bedside: NO       GI:  Last Bowel Movement Date: 04/27/23  Current diet:  ADULT DIET Regular; 4 carb choices (60 gm/meal);  Low Fat/Low Chol/High Fiber/HARSHAD  Passing flatus: YES  Tolerating current diet: YES       Pain Management:   Patient states pain is manageable on current regimen: YES    Skin:  Brady Score: 22  Interventions: increase time out of bed and PT/OT consult    Patient Safety:  Fall Score:    Interventions: bed/chair alarm, assistive device (walker, cane, etc), gripper socks, and pt to call before getting OOB       Length of Stay:  Expected LOS: 2d 7h  Actual LOS: 55 Masoud Sethi RN

## 2023-04-28 NOTE — PROGRESS NOTES
Hospitalist Progress Note    NAME: Madiha Mcguire   :  1945   MRN:  [id number]       Assessment / Plan:  Junctional bradycardia heart rates to the 30s at home and in ED POA  Hypotension at home SBP 70 per  POA  History of atrial fibrillation status post ablation POA/frequent PACs, atrial runs  Essential hypertension POA  Hyperlipidemia POA   Chronic diastolic congestive heart failure POA    Heart rate was in 30s on admission, currently in 70s  Status post IV atropine and ER  TSH normal    Discussed with Dr. Janessa Trevizo -started on flecainide 50 twice daily  DC diltiazem and nebivolol  Will need to monitor on telemetry  No anticoagulation given bleeding risk  Holding losartan      Elevated creatinine from baseline  Creatinine 1.48 on admission  Creatinine normalized    Nausea and diarrhea   Severe periumbilical abdominal pain overnight  Symptoms resolved in ED, no further diarrhea  CT scan abdomen pelvis              Cirrhotic appearing liver, otherwise no abnormalities  Symptoms resolved. Patient tolerating diet likely acute gastroenteritis    Diabetes mellitus type 2 POA  Baseline on oral agents  Sliding scale insulin             Remote history of prior pulmonary embolism POA not currently on blood thinners    25.0 - 29.9 Overweight / Body mass index is 25.66 kg/m². Estimated discharge date: Tomorrow barriers: Cardiology clearance    Code status: Full  Prophylaxis: Hep SQ  Recommended Disposition: Home w/Family     Subjective:     Chief Complaint / Reason for Physician Visit  \" B patient did not have any acute complaints.  at bedside. Patient was started on flecainide and diltiazem. Had episode of hypotension and 90s. Discussed with cardiology. Discontinue diltiazem. Needs to be monitored on flecainide. We will continue to hold losartan due to low blood pressure discussed with RN events overnight.      Review of Systems:  Symptom Y/N Comments  Symptom Y/N Comments   Fever/Chills Chest Pain     Poor Appetite    Edema     Cough    Abdominal Pain     Sputum    Joint Pain     SOB/REGALADO    Pruritis/Rash     Nausea/vomit    Tolerating PT/OT     Diarrhea    Tolerating Diet     Constipation    Other       Could NOT obtain due to:      Objective:     VITALS:   Last 24hrs VS reviewed since prior progress note. Most recent are:  Patient Vitals for the past 24 hrs:   Temp Pulse Resp BP SpO2   04/28/23 1646 98.6 °F (37 °C) (!) 101 18 125/63 96 %   04/28/23 1630 -- (!) 101 -- 125/63 --   04/28/23 1139 97.8 °F (36.6 °C) 81 16 (!) 98/48 95 %   04/28/23 1130 -- 93 -- (!) 98/48 --   04/28/23 1117 98.1 °F (36.7 °C) 81 18 (!) 83/56 94 %   04/28/23 0917 -- 78 -- 116/66 --   04/28/23 0742 98 °F (36.7 °C) 75 16 (!) 133/58 92 %   04/28/23 0327 98 °F (36.7 °C) 76 18 132/62 97 %   04/27/23 2303 97.7 °F (36.5 °C) 89 18 (!) 107/58 96 %   04/27/23 2139 -- 94 -- 120/67 --   04/27/23 1915 98.3 °F (36.8 °C) (!) 106 18 (!) 134/96 94 %       No intake or output data in the 24 hours ending 04/28/23 1654       I had a face to face encounter and independently examined this patient on 4/28/2023, as outlined below:  PHYSICAL EXAM:  General: WD, WN. Alert, cooperative, no acute distress    EENT:  EOMI. Anicteric sclerae. MMM  Resp:  CTA bilaterally, no wheezing or rales. No accessory muscle use  CV:  Regular  rhythm,  No edema  GI:  Soft, Non distended, Non tender. +Bowel sounds  Neurologic:  Alert and oriented X 3, normal speech,   Psych:   Good insight. Not anxious nor agitated  Skin:  No rashes.   No jaundice    Reviewed most current lab test results and cultures  YES  Reviewed most current radiology test results   YES  Review and summation of old records today    NO  Reviewed patient's current orders and MAR    YES  PMH/SH reviewed - no change compared to H&P  ________________________________________________________________________  Care Plan discussed with:    Comments   Patient x    Family  x    RN x    Care Manager x Consultant                       x Multidiciplinary team rounds were held today with , nursing, pharmacist and clinical coordinator. Patient's plan of care was discussed; medications were reviewed and discharge planning was addressed. ________________________________________________________________________  Total NON critical care TIME:  40 Minutes    Total CRITICAL CARE TIME Spent:   Minutes non procedure based      Comments   >50% of visit spent in counseling and coordination of care x    ________________________________________________________________________  Alicia Mora MD     Procedures: see electronic medical records for all procedures/Xrays and details which were not copied into this note but were reviewed prior to creation of Plan. LABS:  I reviewed today's most current labs and imaging studies.   Pertinent labs include:  Recent Labs     04/26/23  0110   WBC 6.6   HGB 11.0*   HCT 34.1*   *       Recent Labs     04/26/23  0110      K 3.8   *   CO2 24   *   BUN 22*   CREA 0.98   CA 9.2   ALB 4.0   TBILI 1.3*   ALT 25   INR 1.1         Signed: Alicia Mora MD

## 2023-04-28 NOTE — PROGRESS NOTES
EP/ ARRHYTHMIA Progress Note    Patient ID:  Patient: Madiha Mcguire  MRN: [id number]  Age: [de-identified] y.o.  : 1945    Date of  Admission: 2023 12:12 PM   PCP:  Lisa Luther NP  Usual cardiologist:  Christina Amaro MD    Assessment:   Frequent PAC's, atrial runs, and atrial tachycardia. Sinus node dysfunction and hypotension, seems acquired (medication excess +/- high vagal tone with abdominal distress). Resolved. Persistent atrial fibrillation in the past, s/p Afib ablation in 2021 (PVI, LA roof, ant mitral, CTI lines using RF). Abdominal discomfort, diarrhea, resolved. LGIB in 2022, requiring RBC transfusions. DM type 2. Hypertension. Remote pulmonary embolism. Mild thrombocytopenia. Mild anemia. Full code. Plan:     ECG done  shows atrial tachycardia. There are definite P waves. On tele she's had intermittent fixed coupled beats. Started flecainide 50 mg po BID, continue with this dose. We discussed the pro's and con's of anticoagulation given her history. I will NOT start anticoagulation due to bleeding risk. OK to stop baby ASA daily weighing risk and benefit. Continue heparin DVT prophylaxis while here. Stop diltiazem (tried to restart here but hypotension limited this after one dose). Stop Bystolic (she didn't get it here). Answered all her questions. She'll be here overnight to see that she holds her BP and her resting HR is ~100 or less. No pacemaker recommended at this time. Discussed with Dr. Johnna Carbajal. [x]       High complexity decision making was performed in this patient    Madiha Mcguire is a [de-identified] y.o. female with a history of bradycardia and hypotension, altered mental status after several days of diarrhea. She had abdominal pain on the day she came to the hospital.  Here, given IVF. Her Bystolic and diltiazem were held. She improved. Attempted to add back low dose diltiazem but her BP is too low to do so.   Stopped after one dose. She was asymptomatic. TODAY:  She denies chest pain, syncope, dizziness, palpitations here. No orthopnea, PND, or edema. No TIA or stroke symptoms otherwise.       Allergies   Allergen Reactions    Baclofen Other (comments)     Hallucinations    Albuterol Palpitations    Bactrim [Sulfamethoxazole-Trimethoprim] Not Reported This Time    Diuretic Softgels [Pamabrom] Other (comments)     Lowers potassium, very sensitive      Iodine Unknown (comments)    Lipitor [Atorvastatin] Myalgia    Shellfish Containing Products Unknown (comments)     \"got choked, It might have been the spices\"    Tizanidine Unknown (comments)          Current Facility-Administered Medications   Medication Dose Route Frequency    pseudoephedrine (SUDAFED) tablet 30 mg  30 mg Oral Q4H PRN    [Held by provider] losartan (COZAAR) tablet 25 mg  25 mg Oral DAILY    flecainide (TAMBOCOR) tablet 50 mg  50 mg Oral Q12H    [Held by provider] dilTIAZem IR (CARDIZEM) tablet 30 mg  30 mg Oral AC&HS    heparin (porcine) injection 5,000 Units  5,000 Units SubCUTAneous Q12H    morphine injection 2 mg  2 mg IntraVENous Q4H PRN    melatonin tablet 3 mg  3 mg Oral QHS PRN    hydrALAZINE (APRESOLINE) 20 mg/mL injection 10 mg  10 mg IntraVENous Q6H PRN    guaiFENesin (ROBITUSSIN) 100 mg/5 mL oral liquid 100 mg  100 mg Oral Q4H PRN    sodium chloride (NS) flush 5-40 mL  5-40 mL IntraVENous Q8H    sodium chloride (NS) flush 5-40 mL  5-40 mL IntraVENous PRN    acetaminophen (TYLENOL) tablet 650 mg  650 mg Oral Q6H PRN    Or    acetaminophen (TYLENOL) suppository 650 mg  650 mg Rectal Q6H PRN    promethazine (PHENERGAN) tablet 12.5 mg  12.5 mg Oral Q6H PRN    Or    ondansetron (ZOFRAN) injection 4 mg  4 mg IntraVENous Q6H PRN    bisacodyL (DULCOLAX) tablet 10 mg  10 mg Oral DAILY PRN    [Held by provider] 0.9% sodium chloride infusion  50 mL/hr IntraVENous CONTINUOUS    glucose chewable tablet 16 g  4 Tablet Oral PRN    glucagon (GLUCAGEN) injection 1 mg  1 mg IntraMUSCular PRN    dextrose 10% infusion 0-250 mL  0-250 mL IntraVENous PRN    insulin lispro (HUMALOG) injection   SubCUTAneous AC&HS    buPROPion SR (WELLBUTRIN SR) tablet 150 mg  150 mg Oral BID    levalbuterol (XOPENEX) nebulizer soln 0.63 mg/3 mL  0.63 mg Nebulization Q4H PRN    hydrALAZINE (APRESOLINE) 20 mg/mL injection 20 mg  20 mg IntraVENous Q6H PRN    cyanocobalamin (VITAMIN B12) tablet 500 mcg  500 mcg Oral DAILY    atropine 1 mg/mL injection 1 mg  1 mg IntraVENous PRN       Review of Symptoms:  See H&P, otherwise noncontributory across 12 body systems. Objective:      Physical Exam:  Temp (24hrs), Av.1 °F (36.7 °C), Min:97.7 °F (36.5 °C), Max:98.6 °F (37 °C)    Patient Vitals for the past 8 hrs:   Pulse   23 1646 (!) 101   23 1630 (!) 101   23 1139 81   23 1130 93   23 1117 81   23 0917 78      Patient Vitals for the past 8 hrs:   Resp   23 1646 18   23 1139 16   23 1117 18      Patient Vitals for the past 8 hrs:   BP   23 1646 125/63   23 1630 125/63   23 1139 (!) 98/48   23 1130 (!) 98/48   23 1117 (!) 83/56   23 0917 116/66        No intake or output data in the 24 hours ending 23 1700      Nondiaphoretic, not in acute distress. No scleral icterus, mucous membranes moist, conjuctivae pink, no xanthelasma. Unlabored, clear to auscultation bilaterally, symmetric air movement. Irregular rate and rhythm, no murmur, pericardial rub, knock, or gallop. Palpable radial pulses bilaterally. Abdomen, soft, nontender, nondistended. Extremities without cyanosis or clubbing. Muscle tone and bulk normal for age. No edema. Skin warm and dry. No rashes or ulcers. Neuro grossly nonfocal.  No tremor. Awake and appropriate.     CARDIOGRAPHICS and STUDIES, I reviewed:    Telemetry:  SR with frequent PAC's, atrial runs (atrial tachycardia)    Labs:  No results for input(s): CPK, CKMB, CKNDX, TROIQ in the last 72 hours. No lab exists for component: CPKMB  Lab Results   Component Value Date/Time    Cholesterol, total 239 (H) 04/14/2010 03:50 AM    HDL Cholesterol 33 04/14/2010 03:50 AM    LDL, calculated 170.8 (H) 04/14/2010 03:50 AM    Triglyceride 176 04/14/2010 03:50 AM    CHOL/HDL Ratio 7.2 (H) 04/14/2010 03:50 AM     Recent Labs     04/26/23  0110   INR 1.1   PTP 11.0        Recent Labs     04/26/23  0110      K 3.8   *   CO2 24   BUN 22*   CREA 0.98   *   CA 9.2   ALB 4.0   WBC 6.6   HGB 11.0*   HCT 34.1*   *       Recent Labs     04/26/23  0110   AP 70   TP 6.7   ALB 4.0   GLOB 2.7       No components found for: GLPOC  No results for input(s): PH, PCO2, PO2 in the last 72 hours.         Griselda Beltran MD  4/28/2023

## 2023-04-29 VITALS
DIASTOLIC BLOOD PRESSURE: 62 MMHG | BODY MASS INDEX: 25.61 KG/M2 | SYSTOLIC BLOOD PRESSURE: 126 MMHG | HEART RATE: 87 BPM | OXYGEN SATURATION: 96 % | RESPIRATION RATE: 18 BRPM | WEIGHT: 150 LBS | TEMPERATURE: 98 F | HEIGHT: 64 IN

## 2023-04-29 LAB
GLUCOSE BLD STRIP.AUTO-MCNC: 110 MG/DL (ref 65–117)
GLUCOSE BLD STRIP.AUTO-MCNC: 118 MG/DL (ref 65–117)
GLUCOSE BLD STRIP.AUTO-MCNC: 158 MG/DL (ref 65–117)
SERVICE CMNT-IMP: ABNORMAL
SERVICE CMNT-IMP: ABNORMAL
SERVICE CMNT-IMP: NORMAL

## 2023-04-29 PROCEDURE — 74011250636 HC RX REV CODE- 250/636: Performed by: INTERNAL MEDICINE

## 2023-04-29 PROCEDURE — 74011250637 HC RX REV CODE- 250/637: Performed by: INTERNAL MEDICINE

## 2023-04-29 PROCEDURE — 82962 GLUCOSE BLOOD TEST: CPT

## 2023-04-29 RX ORDER — FLECAINIDE ACETATE 50 MG/1
50 TABLET ORAL EVERY 12 HOURS
Qty: 60 TABLET | Refills: 0 | Status: SHIPPED | OUTPATIENT
Start: 2023-04-29

## 2023-04-29 RX ADMIN — HEPARIN SODIUM 5000 UNITS: 5000 INJECTION INTRAVENOUS; SUBCUTANEOUS at 07:05

## 2023-04-29 RX ADMIN — Medication 500 MCG: at 08:41

## 2023-04-29 RX ADMIN — BUPROPION HYDROCHLORIDE 150 MG: 150 TABLET, EXTENDED RELEASE ORAL at 08:41

## 2023-04-29 RX ADMIN — FLECAINIDE ACETATE 50 MG: 100 TABLET ORAL at 08:41

## 2023-04-29 NOTE — PROGRESS NOTES
Transition of Care Plan:     RUR: 7% \"low risk\"  Disposition: Home with follow up apts  Follow up appointments: PCP & Specialists scheduled  DME needed: Has a rollator and cane at home  Transportation at Discharge: Pt's spouse will provide transport at d/c  [place] or means to access home: Pt has access to home      IM Medicare Letter: 2nd IM letter given; signed copy on chart  Is patient a  and connected with the [facility] [address] [facility]? N/A  Caregiver Contact: Pt's spouse Gagandeep [facility] [phone]  Discharge Caregiver contacted prior to discharge?  at [place] [address] needed?: Not at this time    CM reviewed notes. Patient to follow up with PCP and specialist appt.  Spouse to come for patient   No needs identified by EFREN Collins RN CM   [id number]

## 2023-04-29 NOTE — PROGRESS NOTES
Problem: Hypotension  Goal: *Blood pressure within specified parameters  Outcome: Progressing Towards Goal     Problem: Patient Education: Go to Patient Education Activity  Goal: Patient/Family Education  4/29/2023 1105 by Michael Carr RN  Outcome: Progressing Towards Goal  4/29/2023 1104 by Michael Carr RN  Outcome: Progressing Towards Goal     Problem: Falls - Risk of  Goal: *Absence of Falls  Description: Document Chang Christians Fall Risk and appropriate interventions in the flowsheet.   4/29/2023 1105 by Michael Carr RN  Outcome: Progressing Towards Goal  Note: Fall Risk Interventions:                             4/29/2023 1104 by Michael Carr RN  Outcome: Progressing Towards Goal  Note: Fall Risk Interventions:

## 2023-04-29 NOTE — DISCHARGE SUMMARY
Hospitalist Discharge Summary     Patient ID:  Aakash De Jesus  [id number]  29 y.o.  1945 4/25/2023    PCP on record:  Delphine Rogers NP    Admit date: 4/25/2023  Discharge date and time: 4/29/2023    DISCHARGE DIAGNOSIS:    Junctional bradycardia heart rates to the 30s at home and in ED POA  Hypotension at home SBP 70 per  POA  History of atrial fibrillation status post ablation POA/frequent PACs, atrial runs  Essential hypertension POA  Hyperlipidemia POA   ChroElevated creatinine from baselinenic diastolic congestive heart failure POA  Nausea diarrhea  Diabetes mellitus  CONSULTATIONS:  IP CONSULT TO CARDIOLOGY  IP CONSULT TO CARDIOLOGY    Excerpted HPI from H&P of Waylon Sandoval MD:  [de-identified] female     Currently lives at home with her   Spoke with  at bedside, patient was forgetful about some details of her history     Known atrial fibrillation status post ablation with Dr. Cb Gatica several years ago  Not currently on anticoagulation  Currently maintained on Cardizem CD and Bystolic     Chronic diastolic congestive heart failure  Essential hypertension  Hyperlipidemia     Diabetes mellitus on oral agents     Past week she has been \"sick\" mildly increased shortness of breath and cough which she attributed to allergies   did check her vital signs earlier in the week and did note an episode of bradycardia     Otherwise no issues until overnight  2 episodes of watery diarrhea one was \"quite large\" after midnight  No melena or bright red blood per rectum  Nausea but no vomiting  Severe periumbilical pain now improving  She felt lightheaded getting up  This morning her  checked her blood pressure, systolic blood pressure approximately 70, heart rate in the 30s  No chest pain or new shortness of breath  No fevers, diaphoresis, headaches, sore throat, dysuria  EMS was called because of the low blood pressure and heart rate     ER  No further diarrhea or vomiting  Bradycardic on arrival  EKG with junctional bradycardia heart rates 34  Elevated creatinine 1.48, usual baseline 0.9-1.0  Blood pressure 103/41  Received IV atropine with improvement of heart rate  CT scan abdomen pelvis shows some cirrhotic changes otherwise negative  Cardiology was consulted  We were called to admit the patient    ______________________________________________________________________  DISCHARGE SUMMARY/HOSPITAL COURSE:  for full details see H&P, daily progress notes, labs, consult notes. Patient admitted with the bradycardia, hypotension patient was given IV atropine and IV fluids. Bradycardia resolved. Blood pressure remained stable. Cardiology was consulted  On telemetry review frequent PACs atrial runs and atrial tachycardia. Diltiazem and nebivolol discontinued and started on flecainide. Losartan discontinued . All other home medications resumed. Patient to follow with Dr. Agnel Page as outpatient. All discharge recommendation discussed in detail with the patient and  at bedside all questions were answered.   Discussed with Dr. Virginie Hemphill    Junctional bradycardia heart rates to the 30s at home and in ED POA  Hypotension at home SBP 70 per  POA  History of atrial fibrillation status post ablation POA/frequent PACs, atrial runs  Essential hypertension POA  Hyperlipidemia POA   Chronic diastolic congestive heart failure POA     Heart rate was in 30s on admission, currently in 70s  Status post IV atropine and ER  TSH normal     Discussed with Dr. Virginie Hemphill -started on flecainide 50 twice daily  DC diltiazem and nebivolol    No anticoagulation given bleeding risk  Holding losartan        Elevated creatinine from baseline  Creatinine 1.48 on admission  Creatinine normalized     Nausea and diarrhea   Severe periumbilical abdominal pain overnight  Symptoms resolved in ED, no further diarrhea    CT scan abdomen pelvis              Cirrhotic appearing liver, otherwise no abnormalities  Symptoms resolved. Patient tolerating diet likely acute gastroenteritis     Diabetes mellitus type 2 POA  Resume home regimen      _______________________________________________________________________  Patient seen and examined by me on discharge day. Pertinent Findings:  Gen:    Not in distress  Chest: Clear lungs  CVS:   Regular rhythm. No edema  Abd:  Soft, not distended, not tender  Neuro:  Alert,   _______________________________________________________________________  DISCHARGE MEDICATIONS:   Current Discharge Medication List        START taking these medications    Details   flecainide (TAMBOCOR) 50 mg tablet Take 1 Tablet by mouth every twelve (12) hours. Qty: 60 Tablet, Refills: 0  Start date: 4/29/2023           CONTINUE these medications which have NOT CHANGED    Details   levalbuterol (XOPENEX) 0.63 mg/3 mL nebu 3 mL by Nebulization route every four (4) hours as needed. lidocaine 4 % patch 1 Patch by TransDERmal route every twelve (12) hours every twelve (12) hours. Qty: 10 Patch, Refills: 0      acetaminophen (Tylenol Extra Strength) 500 mg tablet Take 1,000 mg by mouth every six (6) hours as needed for Pain.      levalbuterol tartrate (XOPENEX) 45 mcg/actuation inhaler INHALE 1 PUFF BY MOUTH EVERY 4 HOURS AS NEEDED  Refills: 3      furosemide (LASIX) 20 mg tablet 1 tab every other day as needed as per cardiology  Qty: 30 Tab, Refills: 0      Blood-Glucose Meter monitoring kit With testing strips & lancets 1 month supply  Qty: 1 Kit, Refills: 0      sitaGLIPtin (JANUVIA) 100 mg tablet Take 100 mg by mouth daily. buPROPion SR (WELLBUTRIN SR) 150 mg SR tablet Take 150 mg by mouth two (2) times a day. mometasone (ASMANEX TWISTHALER) 220 mcg (30 doses) inhalation capsule Take 1 Cap by inhalation two (2) times a day. montelukast (SINGULAIR) 10 mg tablet Take 10 mg by mouth nightly.            STOP taking these medications       polyethylene glycol (Miralax) 17 gram/dose powder Comments:   Reason for Stopping:         dilTIAZem ER (CARDIZEM LA) 180 mg tablet Comments:   Reason for Stopping:         omeprazole (PRILOSEC) 20 mg capsule Comments:   Reason for Stopping:         nebivoloL (Bystolic) 2.5 mg tablet Comments:   Reason for Stopping:         losartan (COZAAR) 50 mg tablet Comments:   Reason for Stopping:                 Patient Follow Up Instructions:    Activity: Activity as tolerated  Diet: Diabetic Diet  Wound Care: As directed    Follow-up with PCP and cardiology in 2 weeks in   Follow-up Information       Follow up With Specialties Details Why Contact Xochilt Shane NP Nurse Practitioner Go on 5/5/2023 at 1:00pm for your PCP hospital follow up. [facility] [address] [facility],[address], [facility] Vascular Surgery, Cardiovascular Disease Physician Follow up in 2 day(s)  Daron Cunningham [id number]  [phone]            ________________________________________________________________    Risk of deterioration: Moderate    Condition at Discharge:  Stable  __________________________________________________________________    Disposition  Home with family, no needs    ____________________________________________________________________    Code Status: Full Code  ___________________________________________________________________      Total time in minutes spent coordinating this discharge (includes going over instructions, follow-up, prescriptions, and preparing report for sign off to her PCP) :  40 minutes    Signed:  Jeremiah Castellanos MD

## 2023-04-29 NOTE — PROGRESS NOTES
EP/ ARRHYTHMIA Progress Note    Patient ID:  Patient: Aakash De Jesus  MRN: [id number]  Age: [de-identified] y.o.  : 1945    Date of  Admission: 2023 12:12 PM   PCP:  Delphine Rogers NP  Usual cardiologist:  Georgette Wilkinson MD    Assessment:   Frequent PAC's, atrial runs, and atrial tachycardia. Sinus node dysfunction and hypotension, seems acquired (medication excess +/- high vagal tone with abdominal distress). Resolved. Persistent atrial fibrillation in the past, s/p Afib ablation in 2021 (PVI, LA roof, ant mitral, CTI lines using RF). Abdominal discomfort, diarrhea, resolved. LGIB in 2022, requiring RBC transfusions. DM type 2. Hypertension. Remote pulmonary embolism. Mild thrombocytopenia. Mild anemia. Full code. Plan:     ECG done  shows atrial tachycardia. There are definite P waves. On tele she's had intermittent fixed coupled beats. Started flecainide 50 mg po BID, continue with this on discharge. We discussed the pro's and con's of anticoagulation given her history. I will NOT start anticoagulation due to bleeding risk. OK to stop baby ASA daily weighing risk and benefit. Will defer to Dr. Jayda Pollard on this. Continue heparin DVT prophylaxis while here. Stopped diltiazem (tried to restart here but hypotension limited this after one dose). Stopped Bystolic (she didn't get it here). No pacemaker recommended at this time. She can F/U with Dr. Jayda Pollard (or myself) as an OP, decide on increasing flecainide at that time. [x]       High complexity decision making was performed in this patient    Aakash De Jesus is a [de-identified] y.o. female with a history of bradycardia and hypotension, altered mental status after several days of diarrhea. She had abdominal pain on the day she came to the hospital.  Here, given IVF. Her Bystolic and diltiazem were held. She improved. Attempted to add back low dose diltiazem but her BP is too low to do so.   Stopped after one dose.  She was asymptomatic. TODAY:  She denies chest pain, syncope, dizziness, palpitations here. No orthopnea, PND, or edema. No TIA or stroke symptoms otherwise.       Allergies   Allergen Reactions    Baclofen Other (comments)     Hallucinations    Albuterol Palpitations    Bactrim [Sulfamethoxazole-Trimethoprim] Not Reported This Time    Diuretic Softgels [Pamabrom] Other (comments)     Lowers potassium, very sensitive      Iodine Unknown (comments)    Lipitor [Atorvastatin] Myalgia    Shellfish Containing Products Unknown (comments)     \"got choked, It might have been the spices\"    Tizanidine Unknown (comments)          Current Facility-Administered Medications   Medication Dose Route Frequency    [Held by provider] losartan (COZAAR) tablet 25 mg  25 mg Oral DAILY    flecainide (TAMBOCOR) tablet 50 mg  50 mg Oral Q12H    [Held by provider] dilTIAZem IR (CARDIZEM) tablet 30 mg  30 mg Oral AC&HS    heparin (porcine) injection 5,000 Units  5,000 Units SubCUTAneous Q12H    morphine injection 2 mg  2 mg IntraVENous Q4H PRN    melatonin tablet 3 mg  3 mg Oral QHS PRN    hydrALAZINE (APRESOLINE) 20 mg/mL injection 10 mg  10 mg IntraVENous Q6H PRN    guaiFENesin (ROBITUSSIN) 100 mg/5 mL oral liquid 100 mg  100 mg Oral Q4H PRN    sodium chloride (NS) flush 5-40 mL  5-40 mL IntraVENous Q8H    sodium chloride (NS) flush 5-40 mL  5-40 mL IntraVENous PRN    acetaminophen (TYLENOL) tablet 650 mg  650 mg Oral Q6H PRN    Or    acetaminophen (TYLENOL) suppository 650 mg  650 mg Rectal Q6H PRN    promethazine (PHENERGAN) tablet 12.5 mg  12.5 mg Oral Q6H PRN    Or    ondansetron (ZOFRAN) injection 4 mg  4 mg IntraVENous Q6H PRN    bisacodyL (DULCOLAX) tablet 10 mg  10 mg Oral DAILY PRN    [Held by provider] 0.9% sodium chloride infusion  50 mL/hr IntraVENous CONTINUOUS    glucose chewable tablet 16 g  4 Tablet Oral PRN    glucagon (GLUCAGEN) injection 1 mg  1 mg IntraMUSCular PRN    dextrose 10% infusion 0-250 mL  0-250 mL IntraVENous PRN    insulin lispro (HUMALOG) injection   SubCUTAneous AC&HS    buPROPion SR (WELLBUTRIN SR) tablet 150 mg  150 mg Oral BID    levalbuterol (XOPENEX) nebulizer soln 0.63 mg/3 mL  0.63 mg Nebulization Q4H PRN    hydrALAZINE (APRESOLINE) 20 mg/mL injection 20 mg  20 mg IntraVENous Q6H PRN    cyanocobalamin (VITAMIN B12) tablet 500 mcg  500 mcg Oral DAILY    atropine 1 mg/mL injection 1 mg  1 mg IntraVENous PRN       Review of Symptoms:  See H&P, otherwise noncontributory across 12 body systems. Objective:      Physical Exam:  Temp (24hrs), Av.2 °F (36.8 °C), Min:97.8 °F (36.6 °C), Max:98.6 °F (37 °C)    Patient Vitals for the past 8 hrs:   Pulse   23 1051 87   23 0838 71   23 0530 70      Patient Vitals for the past 8 hrs:   Resp   23 1051 18   23 0838 20      Patient Vitals for the past 8 hrs:   BP   23 1051 126/62   23 0838 (!) 128/54   23 0530 (!) 119/59        No intake or output data in the 24 hours ending 23 1259      Nondiaphoretic, not in acute distress. Unlabored, clear to auscultation bilaterally, symmetric air movement. Irregular rate and rhythm, no murmur, pericardial rub, knock, or gallop. Palpable radial pulses bilaterally. Abdomen, soft, nontender, nondistended. Extremities without cyanosis or clubbing. Muscle tone and bulk normal for age. No edema. Skin warm and dry. No rashes or ulcers. Neuro grossly nonfocal.  No tremor. Awake and appropriate. CARDIOGRAPHICS and STUDIES, I reviewed:    Telemetry:  SR with frequent PAC's, atrial runs (atrial tachycardia)    Labs:  No results for input(s): CPK, CKMB, CKNDX, TROIQ in the last 72 hours.     No lab exists for component: CPKMB  Lab Results   Component Value Date/Time    Cholesterol, total 239 (H) 2010 03:50 AM    HDL Cholesterol 33 2010 03:50 AM    LDL, calculated 170.8 (H) 2010 03:50 AM    Triglyceride 176 2010 03:50 AM    CHOL/HDL Ratio 7.2 (H) 04/14/2010 03:50 AM     No results for input(s): INR, PTP, APTT, INREXT, INREXT in the last 72 hours. No results for input(s): NA, K, CL, CO2, BUN, CREA, GLU, PHOS, CA, ALB, WBC, HGB, HCT, PLT, HGBEXT, HCTEXT, PLTEXT, HGBEXT, HCTEXT, PLTEXT in the last 72 hours. No results for input(s): AP, TBIL, TP, ALB, GLOB, GGT, AML, LPSE in the last 72 hours. No lab exists for component: SGOT, GPT, AMYP, HLPSE    No components found for: GLPOC  No results for input(s): PH, PCO2, PO2 in the last 72 hours.         Anya Rider MD  4/29/2023

## 2023-04-29 NOTE — PROGRESS NOTES
0700: Bedside and Verbal shift change report given to Bobby Orellana (oncoming nurse) by Ben Hernandez (offgoing nurse). Report included the following information SBAR, Kardex, Intake/Output, and Cardiac Rhythm NSR .

## 2023-04-29 NOTE — PROGRESS NOTES
1900: Bedside shift change report given to [address] [facility] (oncoming nurse) by Corrie Ventura RN (offgoing nurse). Report included the following information SBAR, Kardex, Intake/Output, MAR, Recent Results, and Cardiac Rhythm Afib .           0700: End of Shift Note    Bedside shift change report given to Bobby Orellana (oncoming nurse) by Brie Sosa RN (offgoing nurse). Report included the following information SBAR, Kardex, Intake/Output, MAR, Recent Results, and Cardiac Rhythm Afib    Shift worked:  [id number]     Shift summary and any significant changes:     No significant events overnight. Concerns for physician to address:  HR jumps to 140s when pt ambulates but does resolve on its own once resting     [facility]          Activity:  Activity Level: Bath Room Privileges, Up with Assistance  Number times ambulated in hallways past shift: 2  Number of times OOB to chair past shift: 0    Cardiac:   Cardiac Monitoring: Yes      Cardiac Rhythm: Atrial Fib    Access:  Current line(s): PIV     Genitourinary:   Urinary status: voiding    Respiratory:   O2 Device: None (Room air)  Chronic home O2 use?: NO  Incentive spirometer at bedside: YES       GI:  Last Bowel Movement Date: 04/28/23  Current diet:  ADULT DIET Regular; 4 carb choices (60 gm/meal);  Low Fat/Low Chol/High Fiber/HARSHAD  Passing flatus: YES  Tolerating current diet: YES       Pain Management:   Patient states pain is manageable on current regimen: YES    Skin:  Brady Score: 22  Interventions: increase time out of bed    Patient Safety:  Fall Score:    Interventions: bed/chair alarm, assistive device (walker, cane, etc), gripper socks, and pt to call before getting OOB       Length of Stay:  Expected LOS: 2d 7h  Actual LOS: 5 [address] Dr Ibarra, RN        Care Plan    Problem: Risk for Spread of Infection  Goal: Prevent transmission of infectious organism to others  Description: Prevent the transmission of infectious organisms to other patients, staff members, and visitors. Outcome: Progressing Towards Goal     Problem: Falls - Risk of  Goal: *Absence of Falls  Description: Document Leilani Fam Fall Risk and appropriate interventions in the flowsheet.   Outcome: Progressing Towards Goal  Note: Fall Risk Interventions:

## 2023-04-29 NOTE — PROGRESS NOTES
I have reviewed discharge instructions with the patient and spouse. The patient and spouse verbalized understanding. Pt discharged to home at 1500.

## 2023-05-01 ENCOUNTER — PATIENT OUTREACH (OUTPATIENT)
Dept: CASE MANAGEMENT | Age: 78
End: 2023-05-01

## 2023-05-01 RX ORDER — GLUCOSAMINE SULFATE 1500 MG
1000 POWDER IN PACKET (EA) ORAL DAILY
COMMUNITY

## 2023-05-01 RX ORDER — LANOLIN ALCOHOL/MO/W.PET/CERES
500 CREAM (GRAM) TOPICAL DAILY
COMMUNITY

## 2023-05-01 RX ORDER — UREA 10 %
LOTION (ML) TOPICAL
COMMUNITY

## 2023-05-01 NOTE — PROGRESS NOTES
Care Transitions Initial Call    Call within 2 business days of discharge: Yes     Patient Current Location: [place]    Patient: Nicole Ryan Patient : 1945 MRN: [id number]    Last Discharge  Street       Date Complaint Diagnosis Description Type Department Provider    23 Diarrhea; Hypotension Bradycardia . .. ED to Hosp-Admission (Discharged) (ADMIT) [id number] Herbert Lynch MD; Marely Hernandez. .. Was this an external facility discharge? No     Challenges to be reviewed by the provider   Additional needs identified to be addressed with provider: yes  labs-     23 01:10   Hemoglobin A1c, (calculated) 6.1 (H)   (H): Data is abnormally high             Method of communication with provider :  Fax    Discussed COVID-19 related testing which was not done at this time. Test results were not done. Patient informed of results, if available? no     Advance Care Planning:   Does patient have an Advance Directive: yes, reviewed and current. Inpatient Readmission Risk score: Unplanned Readmit Risk Score: 7    Was this a readmission? no   Patient stated reason for the admission: low heart rate    Patients top risk factors for readmission: Bradycardia,  A fib, HF,HTN  Interventions to address risk factors: Scheduled appointment with PCP-YOHAN Shaikh on  at 1pm, Scheduled appointment with Specialist-Dr Charly Romero 5/15/23 at 2:45 pm first available, and Assessment and support for treatment adherence and medication management-Bradycardia,  A fib, HF,HTN    Care Transition Nurse (CTN) contacted the family- on HIPAA by telephone to perform post hospital discharge assessment. Verified name and  with family as identifiers. Provided introduction to self, and explanation of the CTN role. CTN reviewed discharge instructions, medical action plan and red flags with family who verbalized understanding. Were discharge instructions available to patient? yes.  Reviewed appropriate site of care based on symptoms and resources available to patient including: PCP and Specialist. Family given an opportunity to ask questions and does not have any further questions or concerns at this time. The family agrees to contact the PCP office for questions related to their healthcare. Medication reconciliation was performed with family, who verbalizes understanding of administration of home medications. Referral to Pharm D needed: no     Home Health/Outpatient orders at discharge: none      Durable Medical Equipment ordered at discharge: None  Was patient discharged with a pulse oximeter? no    Discussed follow-up appointments. If no appointment was previously scheduled, appointment scheduling offered: yes. Is follow up appointment scheduled within 7 days of discharge? yes. [facility] follow up appointment(s): No future appointments. [facility] follow up appointment(s): PCP- YOHAN Tellez 5/5/23 at 1 pm, Cards-Dr Kelsea Velasquez 5/15/23 at 2:45 pm    Plan for follow-up call in 5-7 days based on severity of symptoms and risk factors. CTN provided contact information for future needs. Goals Addressed                   This Visit's Progress     Attends follow-up appointments as directed. 05/01/23   Patient has an appt with PCP YOHAN Tellez on 5/5/23 at 1 pm.  Patient has an appt with Cards-Dr Kelsea Velasquez on 5/15/23 at 2:45 pm (first available). Monitor status of these appt on next call. Zachery ARECHIGA, RN, CCM / Care Transition Nurse / [phone]          Prevent complications post hospitalization. 05/01/23   Called and spoke to patient  (on HIPAA) verified patient name and birth date. Mr Lacey Ty states wife is weak and a little dizzy, they have walker and he is using that and walking with her. Patient is eating some and he is trying to encourage fluids. Mr Lacey Ty did check her B/P and HR this Am but this was right after she got up and got dressed. B/P 106/67, .  Encouraged to check her B/P and HR when she is resting instead of after doing activity. Has not been checking glucose and has been taken off Januvia by PCP. Monitor for weakness, dizziness, PO intake, B/P and HR, any more diarrhea? activity tolerance on next call.     Gillian Vigil MSN, RN, CCM / Care Transition Nurse / [phone]

## 2023-07-28 NOTE — Clinical Note
Informed Dr. Velasco that patient reported she did not void during the day. Bladder scanned at 2213 and results showed 25 mL. Patient did not void during the night. Bladder scanned at 0607 and results showed 337 mL. Per Dr. Velasco, discontinue bladder scan orders due to patient on dialysis. No new orders at this time.--ec,lpn   Patient transported with a Registered Nurse.

## 2024-03-08 ENCOUNTER — APPOINTMENT (OUTPATIENT)
Facility: HOSPITAL | Age: 79
End: 2024-03-08
Payer: MEDICARE

## 2024-03-08 ENCOUNTER — HOSPITAL ENCOUNTER (EMERGENCY)
Facility: HOSPITAL | Age: 79
Discharge: HOME | End: 2024-03-08
Attending: EMERGENCY MEDICINE
Payer: MEDICARE

## 2024-03-08 VITALS
HEIGHT: 63 IN | TEMPERATURE: 98.6 F | DIASTOLIC BLOOD PRESSURE: 65 MMHG | RESPIRATION RATE: 25 BRPM | HEART RATE: 92 BPM | SYSTOLIC BLOOD PRESSURE: 122 MMHG | WEIGHT: 145 LBS | BODY MASS INDEX: 25.69 KG/M2 | OXYGEN SATURATION: 93 %

## 2024-03-08 DIAGNOSIS — J45.41 MODERATE PERSISTENT ASTHMA WITH ACUTE EXACERBATION: Primary | ICD-10-CM

## 2024-03-08 LAB
ALBUMIN SERPL-MCNC: 3.8 G/DL (ref 3.5–5)
ALBUMIN/GLOB SERPL: 1.1 (ref 1.1–2.2)
ALP SERPL-CCNC: 90 U/L (ref 45–117)
ALT SERPL-CCNC: 35 U/L (ref 12–78)
ANION GAP SERPL CALC-SCNC: 6 MMOL/L (ref 5–15)
AST SERPL-CCNC: 45 U/L (ref 15–37)
BASOPHILS # BLD: 0 K/UL (ref 0–0.1)
BASOPHILS NFR BLD: 0 % (ref 0–1)
BILIRUB SERPL-MCNC: 1.2 MG/DL (ref 0.2–1)
BUN SERPL-MCNC: 14 MG/DL (ref 6–20)
BUN/CREAT SERPL: 15 (ref 12–20)
CALCIUM SERPL-MCNC: 9.1 MG/DL (ref 8.5–10.1)
CHLORIDE SERPL-SCNC: 96 MMOL/L (ref 97–108)
CO2 SERPL-SCNC: 30 MMOL/L (ref 21–32)
CREAT SERPL-MCNC: 0.92 MG/DL (ref 0.55–1.02)
DIFFERENTIAL METHOD BLD: ABNORMAL
EKG ATRIAL RATE: 141 BPM
EKG DIAGNOSIS: NORMAL
EKG Q-T INTERVAL: 360 MS
EKG QRS DURATION: 96 MS
EKG QTC CALCULATION (BAZETT): 475 MS
EKG R AXIS: 259 DEGREES
EKG T AXIS: 46 DEGREES
EKG VENTRICULAR RATE: 105 BPM
EOSINOPHIL # BLD: 0.1 K/UL (ref 0–0.4)
EOSINOPHIL NFR BLD: 1 % (ref 0–7)
ERYTHROCYTE [DISTWIDTH] IN BLOOD BY AUTOMATED COUNT: 13.4 % (ref 11.5–14.5)
FLUAV AG NPH QL IA: NEGATIVE
FLUBV AG NOSE QL IA: NEGATIVE
GLOBULIN SER CALC-MCNC: 3.6 G/DL (ref 2–4)
GLUCOSE SERPL-MCNC: 110 MG/DL (ref 65–100)
HCT VFR BLD AUTO: 38.9 % (ref 35–47)
HGB BLD-MCNC: 12.8 G/DL (ref 11.5–16)
IMM GRANULOCYTES # BLD AUTO: 0 K/UL (ref 0–0.04)
IMM GRANULOCYTES NFR BLD AUTO: 0 % (ref 0–0.5)
LACTATE BLD-SCNC: 1.89 MMOL/L (ref 0.4–2)
LYMPHOCYTES # BLD: 0.6 K/UL (ref 0.8–3.5)
LYMPHOCYTES NFR BLD: 9 % (ref 12–49)
MAGNESIUM SERPL-MCNC: 1.8 MG/DL (ref 1.6–2.4)
MCH RBC QN AUTO: 30.3 PG (ref 26–34)
MCHC RBC AUTO-ENTMCNC: 32.9 G/DL (ref 30–36.5)
MCV RBC AUTO: 92.2 FL (ref 80–99)
MONOCYTES # BLD: 0.7 K/UL (ref 0–1)
MONOCYTES NFR BLD: 11 % (ref 5–13)
NEUTS SEG # BLD: 4.8 K/UL (ref 1.8–8)
NEUTS SEG NFR BLD: 79 % (ref 32–75)
NRBC # BLD: 0 K/UL (ref 0–0.01)
NRBC BLD-RTO: 0 PER 100 WBC
NT PRO BNP: 1589 PG/ML
PLATELET # BLD AUTO: 121 K/UL (ref 150–400)
PMV BLD AUTO: 10.3 FL (ref 8.9–12.9)
POTASSIUM SERPL-SCNC: 3.2 MMOL/L (ref 3.5–5.1)
PROT SERPL-MCNC: 7.4 G/DL (ref 6.4–8.2)
RBC # BLD AUTO: 4.22 M/UL (ref 3.8–5.2)
RBC MORPH BLD: ABNORMAL
SARS-COV-2 RDRP RESP QL NAA+PROBE: NOT DETECTED
SODIUM SERPL-SCNC: 132 MMOL/L (ref 136–145)
SOURCE: NORMAL
TROPONIN I SERPL HS-MCNC: 75 NG/L (ref 0–51)
TROPONIN I SERPL HS-MCNC: 78 NG/L (ref 0–51)
WBC # BLD AUTO: 6.2 K/UL (ref 3.6–11)

## 2024-03-08 PROCEDURE — 99285 EMERGENCY DEPT VISIT HI MDM: CPT

## 2024-03-08 PROCEDURE — 71045 X-RAY EXAM CHEST 1 VIEW: CPT

## 2024-03-08 PROCEDURE — 87635 SARS-COV-2 COVID-19 AMP PRB: CPT

## 2024-03-08 PROCEDURE — 80053 COMPREHEN METABOLIC PANEL: CPT

## 2024-03-08 PROCEDURE — 94640 AIRWAY INHALATION TREATMENT: CPT

## 2024-03-08 PROCEDURE — 6360000002 HC RX W HCPCS: Performed by: EMERGENCY MEDICINE

## 2024-03-08 PROCEDURE — 6370000000 HC RX 637 (ALT 250 FOR IP): Performed by: EMERGENCY MEDICINE

## 2024-03-08 PROCEDURE — 83880 ASSAY OF NATRIURETIC PEPTIDE: CPT

## 2024-03-08 PROCEDURE — 84484 ASSAY OF TROPONIN QUANT: CPT

## 2024-03-08 PROCEDURE — 94762 N-INVAS EAR/PLS OXIMTRY CONT: CPT

## 2024-03-08 PROCEDURE — 87804 INFLUENZA ASSAY W/OPTIC: CPT

## 2024-03-08 PROCEDURE — 83735 ASSAY OF MAGNESIUM: CPT

## 2024-03-08 PROCEDURE — 87040 BLOOD CULTURE FOR BACTERIA: CPT

## 2024-03-08 PROCEDURE — 36415 COLL VENOUS BLD VENIPUNCTURE: CPT

## 2024-03-08 PROCEDURE — 83605 ASSAY OF LACTIC ACID: CPT

## 2024-03-08 PROCEDURE — 85025 COMPLETE CBC W/AUTO DIFF WBC: CPT

## 2024-03-08 RX ORDER — BENZONATATE 100 MG/1
100 CAPSULE ORAL
Status: COMPLETED | OUTPATIENT
Start: 2024-03-08 | End: 2024-03-08

## 2024-03-08 RX ORDER — POTASSIUM CHLORIDE 20 MEQ/1
20 TABLET, EXTENDED RELEASE ORAL ONCE
Status: COMPLETED | OUTPATIENT
Start: 2024-03-08 | End: 2024-03-08

## 2024-03-08 RX ORDER — LEVALBUTEROL INHALATION SOLUTION 1.25 MG/3ML
1.25 SOLUTION RESPIRATORY (INHALATION)
Status: COMPLETED | OUTPATIENT
Start: 2024-03-08 | End: 2024-03-08

## 2024-03-08 RX ORDER — PREDNISONE 20 MG/1
20 TABLET ORAL DAILY
Qty: 5 TABLET | Refills: 0 | Status: SHIPPED | OUTPATIENT
Start: 2024-03-08 | End: 2024-03-13

## 2024-03-08 RX ADMIN — BENZONATATE 100 MG: 100 CAPSULE ORAL at 21:19

## 2024-03-08 RX ADMIN — LEVALBUTEROL HYDROCHLORIDE 1.25 MG: 1.25 SOLUTION RESPIRATORY (INHALATION) at 20:34

## 2024-03-08 RX ADMIN — LEVALBUTEROL HYDROCHLORIDE 1.25 MG: 1.25 SOLUTION RESPIRATORY (INHALATION) at 17:52

## 2024-03-08 RX ADMIN — POTASSIUM CHLORIDE 20 MEQ: 1500 TABLET, EXTENDED RELEASE ORAL at 21:18

## 2024-03-08 ASSESSMENT — PAIN SCALES - GENERAL: PAINLEVEL_OUTOF10: 5

## 2024-03-09 ASSESSMENT — ENCOUNTER SYMPTOMS
DIARRHEA: 0
COLOR CHANGE: 0
VOMITING: 0
SHORTNESS OF BREATH: 1
ABDOMINAL PAIN: 0
COUGH: 1

## 2024-03-09 NOTE — ED PROVIDER NOTES
these medications      predniSONE 20 MG tablet  Commonly known as: DELTASONE  Take 1 tablet by mouth daily for 5 days            ASK your doctor about these medications      acetaminophen 500 MG tablet  Commonly known as: TYLENOL     buPROPion 150 MG extended release tablet  Commonly known as: WELLBUTRIN SR     dilTIAZem 180 MG Tb24 extended release tablet  Commonly known as: CARDIZEM LA     flecainide 50 MG tablet  Commonly known as: TAMBOCOR     furosemide 20 MG tablet  Commonly known as: LASIX     gemfibrozil 600 MG tablet  Commonly known as: LOPID     levalbuterol 45 MCG/ACT inhaler  Commonly known as: XOPENEX HFA     lidocaine 4 % external patch     losartan 50 MG tablet  Commonly known as: COZAAR     mometasone 220 MCG/ACT Aepb inhaler  Commonly known as: ASMANEX     montelukast 10 MG tablet  Commonly known as: SINGULAIR     nebivolol 2.5 MG tablet  Commonly known as: BYSTOLIC     omeprazole 20 MG delayed release capsule  Commonly known as: PRILOSEC     polyethylene glycol 17 GM/SCOOP powder  Commonly known as: GLYCOLAX     SITagliptin 100 MG tablet  Commonly known as: JANUVIA               Where to Get Your Medications        Information about where to get these medications is not yet available    Ask your nurse or doctor about these medications  predniSONE 20 MG tablet           DISCONTINUED MEDICATIONS:  Current Discharge Medication List          I am the Primary Clinician of Record.   Val Spencer MD (electronically signed)      (Please note that parts of this dictation were completed with voice recognition software. Quite often unanticipated grammatical, syntax, homophones, and other interpretive errors are inadvertently transcribed by the computer software. Please disregards these errors. Please excuse any errors that have escaped final proofreading.)         Val Spencer MD  03/09/24 2019

## 2024-03-09 NOTE — DISCHARGE INSTRUCTIONS
Thank You!    It was a pleasure taking care of you in our Emergency Department today. We know that when you come to our Emergency Department, you are entrusting us with your health, comfort, and safety. Our physicians and nurses honor that trust, and truly appreciate the opportunity to care for you and your loved ones.      We also value your feedback. If you receive a survey about your Emergency Department experience today, please fill it out.  We care about our patients' feedback, and we listen to what you have to say.  Thank you.    Val Spencer MD  ________________________________________________________________________  I have included a copy of your lab results and/or radiologic studies from today's visit so you can have them easily available at your follow-up visit. We hope you feel better and please do not hesitate to contact the ED if you have any questions at all!    Recent Results (from the past 12 hour(s))   EKG 12 Lead    Collection Time: 03/08/24  4:44 PM   Result Value Ref Range    Ventricular Rate 105 BPM    Atrial Rate 141 BPM    QRS Duration 96 ms    Q-T Interval 360 ms    QTc Calculation (Bazett) 475 ms    R Axis 259 degrees    T Axis 46 degrees    Diagnosis       Atrial fibrillation with rapid ventricular response  Right superior axis deviation  Anterior infarct (cited on or before 22-FEB-2021)  When compared with ECG of 27-APR-2023 17:54,  No significant change was found     CBC with Auto Differential    Collection Time: 03/08/24  5:20 PM   Result Value Ref Range    WBC 6.2 3.6 - 11.0 K/uL    RBC 4.22 3.80 - 5.20 M/uL    Hemoglobin 12.8 11.5 - 16.0 g/dL    Hematocrit 38.9 35.0 - 47.0 %    MCV 92.2 80.0 - 99.0 FL    MCH 30.3 26.0 - 34.0 PG    MCHC 32.9 30.0 - 36.5 g/dL    RDW 13.4 11.5 - 14.5 %    Platelets 121 (L) 150 - 400 K/uL    MPV 10.3 8.9 - 12.9 FL    Nucleated RBCs 0.0 0  WBC    nRBC 0.00 0.00 - 0.01 K/uL    Neutrophils % 79 (H) 32 - 75 %    Lymphocytes % 9 (L) 12 - 49 %

## 2024-03-10 LAB
BACTERIA SPEC CULT: NORMAL
BACTERIA SPEC CULT: NORMAL
SERVICE CMNT-IMP: NORMAL
SERVICE CMNT-IMP: NORMAL

## 2024-03-12 LAB
EKG ATRIAL RATE: 141 BPM
EKG DIAGNOSIS: NORMAL
EKG Q-T INTERVAL: 360 MS
EKG QRS DURATION: 96 MS
EKG QTC CALCULATION (BAZETT): 475 MS
EKG R AXIS: 259 DEGREES
EKG T AXIS: 46 DEGREES
EKG VENTRICULAR RATE: 105 BPM

## 2024-03-14 LAB
BACTERIA SPEC CULT: NORMAL
BACTERIA SPEC CULT: NORMAL
SERVICE CMNT-IMP: NORMAL
SERVICE CMNT-IMP: NORMAL

## (undated) DEVICE — SYSTEM SKIN CLSR 22CM DERMBND PRINEO

## (undated) DEVICE — Z DISCONTINUED PER MEDLINE LINE GAS SAMPLING O2/CO2 LNG AD 13 FT NSL W/ TBNG FILTERLINE

## (undated) DEVICE — PINNACLE INTRODUCER SHEATH: Brand: PINNACLE

## (undated) DEVICE — Device

## (undated) DEVICE — TOWEL 4 PLY TISS 19X30 SUE WHT

## (undated) DEVICE — PRESSURE MONITORING SET: Brand: TRUWAVE

## (undated) DEVICE — MEDI-TRACE CADENCE ADULT, DEFIBRILLATION ELECTRODE -RTS  (10 PR/PK) - PHILIPS: Brand: MEDI-TRACE CADENCE

## (undated) DEVICE — KIT ELECTRD SURF FOR DISPLAYING THE 3D POS OF EP CATH

## (undated) DEVICE — HANDLE LT SNAP ON ULT DURABLE LENS FOR TRUMPF ALC DISPOSABLE

## (undated) DEVICE — BASIN EMSIS 16OZ GRAPHITE PLAS KID SHP MOLD GRAD FOR ORAL

## (undated) DEVICE — KENDALL RADIOLUCENT FOAM MONITORING ELECTRODE RECTANGULAR SHAPE: Brand: KENDALL

## (undated) DEVICE — NEEDLE HYPO 18GA L1.5IN PNK S STL HUB POLYPR SHLD REG BVL

## (undated) DEVICE — STERILE POLYISOPRENE POWDER-FREE SURGICAL GLOVES WITH EMOLLIENT COATING: Brand: PROTEXIS

## (undated) DEVICE — INFECTION CONTROL KIT SYS

## (undated) DEVICE — SOLIDIFIER FLD 2OZ 1500CC N DISINF IN BTL DISP SAFESORB

## (undated) DEVICE — CATH IV AUTOGRD BC PNK 20GA 25 -- INSYTE

## (undated) DEVICE — TOWEL SURG W17XL27IN STD BLU COT NONFENESTRATED PREWASHED

## (undated) DEVICE — SYR 10ML LUER LOK 1/5ML GRAD --

## (undated) DEVICE — SUTURE VCRL SZ 1 L27IN ABSRB VLT L36MM CT-1 1/2 CIR J341H

## (undated) DEVICE — SYR 5ML 1/5 GRAD LL NSAF LF --

## (undated) DEVICE — SUTURE VCRL SZ 0 L27IN ABSRB UD L36MM CT-1 1/2 CIR J260H

## (undated) DEVICE — CABLE DIAG FOR ADVISOR HD GRID MAP CATH SENS ENABLED

## (undated) DEVICE — SOLUTION IRRIG 3000ML 0.9% SOD CHL FLX CONT 0797208] ICU MEDICAL INC]

## (undated) DEVICE — NEONATAL-ADULT SPO2 SENSOR: Brand: NELLCOR

## (undated) DEVICE — LABEL MED MRMC ORTH STRL

## (undated) DEVICE — Z DISCONTINUED USE 2717541 SUTURE STRATAFIX SZ 3-0 L30CM NONABSORBABLE UD L26MM FS 3/8

## (undated) DEVICE — CATH EP CRV 7F DUO 2/8 2M LG -- LIVEWIRE STRL

## (undated) DEVICE — SYR 50ML LR LCK 1ML GRAD NSAF --

## (undated) DEVICE — CONTAINER SPEC 20 ML LID NEUT BUFF FORMALIN 10 % POLYPR STS

## (undated) DEVICE — DRESSING HEMOSTATIC SFT INTVENT W/O SLT DBL WRP QUIKCLOT LF

## (undated) DEVICE — CABLE RMFG EXT CATH --

## (undated) DEVICE — SYR 20ML LL STRL LF --

## (undated) DEVICE — DEVICE TRNSF SPIK STL 2008S] MICROTEK MEDICAL INC]

## (undated) DEVICE — MEDI-VAC YANK SUCT HNDL W/TPRD BULBOUS TIP & NON-CONDUCTIVE: Brand: CARDINAL HEALTH

## (undated) DEVICE — 3M™ TEGADERM™ TRANSPARENT FILM DRESSING FRAME STYLE, 1626W, 4 IN X 4-3/4 IN (10 CM X 12 CM), 50/CT 4CT/CASE: Brand: 3M™ TEGADERM™

## (undated) DEVICE — SUTURE ABSORBABLE MONOFILAMENT 2-0 WND CLOSURE GRN V-LOC 180 VLOCL0315

## (undated) DEVICE — ADULT SPO2 SENSOR: Brand: NELLCOR

## (undated) DEVICE — STERILE POLYISOPRENE POWDER-FREE SURGICAL GLOVES: Brand: PROTEXIS

## (undated) DEVICE — FCPS RAD JAW 4LC 240CM W/NDL -- BX/20 RADIAL JAW 4

## (undated) DEVICE — RECIPROCATING BLADE HEAVY DUTY LONG, OFFSET  (77.6 X 0.77 X 11.2MM)

## (undated) DEVICE — SUTURE STRATAFIX SPRL SZ 1 L14IN ABSRB VLT L48CM CTX 1/2 SXPD2B405

## (undated) DEVICE — PREP KIT PEEL PTCH POVIDONE IOD

## (undated) DEVICE — CATHETERIZATION TRAY FOL 14 FR URIMTR STATLOK SURSTP

## (undated) DEVICE — SET ADMIN 16ML TBNG L100IN 2 Y INJ SITE IV PIGGY BK DISP

## (undated) DEVICE — PREP SKN CHLRAPRP APL 26ML STR --

## (undated) DEVICE — STRAP,POSITIONING,KNEE/BODY,FOAM,4X60": Brand: MEDLINE

## (undated) DEVICE — FORCEPS BX L160CM DIA8MM GRSP DISECT CUP TIP NONLOCKING ROT

## (undated) DEVICE — NDL BRCKNBRGH AD 18GX71CM --

## (undated) DEVICE — PENCIL SMK EVAC L10FT DIA95MM TBNG NONSTICK W ADPT TO 22MM

## (undated) DEVICE — GARMENT,MEDLINE,DVT,INT,CALF,MED, GEN2: Brand: MEDLINE

## (undated) DEVICE — ZIMMER® STERILE DISPOSABLE TOURNIQUET CUFF WITH PROTECTIVE SLEEVE AND PLC, DUAL PORT, SINGLE BLADDER, 34 IN. (86 CM)

## (undated) DEVICE — 4-PORT MANIFOLD: Brand: NEPTUNE 2

## (undated) DEVICE — CATHETER MAP 8FR W13XH13MMXL105CM SPC 3MM TIP 1MM L ATRIUM

## (undated) DEVICE — NEEDLE SPNL 22GA L7IN BLK HUB S STL W/ QNCKE PNT W/OUT

## (undated) DEVICE — BAG SPEC BIOHZRD 10 X 10 IN --

## (undated) DEVICE — SET TBNG L260CM IRRIG RF THER COOL PNT

## (undated) DEVICE — BLOCK BITE ENDOSCP AD 21 MM W/ DIL BLU LF DISP

## (undated) DEVICE — CATHETER EP ADOL AD 9FR L90CM TEMP SGL HND SELF LOK 4 W TIP

## (undated) DEVICE — SOLIDIFIER MEDC 1200ML -- CONVERT TO 356117

## (undated) DEVICE — MARKER,SKIN,WI/RULER AND LABELS: Brand: MEDLINE

## (undated) DEVICE — TUBESET BNE PREP CO2 CARBOJET

## (undated) DEVICE — SMOKE EVACUATION TUBING WITH 8 IN INTEGRAL WAND AND SPONGE GUARD: Brand: BUFFALO FILTER

## (undated) DEVICE — INTRODUCER SHTH 8FR L63CM DIL 8FR L67CM 0.032IN TRANSSEPTAL

## (undated) DEVICE — NEEDLE HYPO 25GA L1.5IN BVL ORIENTED ECLIPSE

## (undated) DEVICE — REM POLYHESIVE ADULT PATIENT RETURN ELECTRODE: Brand: VALLEYLAB

## (undated) DEVICE — SET EXTN PRIMING 0.59ML 8.5IN 1.55LB PRSS RATE MINIBOR PUR

## (undated) DEVICE — DRAPE,REIN 53X77,STERILE: Brand: MEDLINE

## (undated) DEVICE — HANDPIECE SET WITH COAXIAL HIGH FLOW TIP AND SUCTION TUBE: Brand: INTERPULSE

## (undated) DEVICE — SOLUTION IRRIG 1000ML H2O STRL BLT

## (undated) DEVICE — CUSTOM CAST PD STR

## (undated) DEVICE — YANKAUER,TAPERED BULBOUS TIP,W/O VENT: Brand: MEDLINE

## (undated) DEVICE — INTRO SHEATH TRANSSEPTAL 8.5FRX71CM

## (undated) DEVICE — CABLE RMFG RSPONS ELEC EXT RED --

## (undated) DEVICE — 3M™ IOBAN™ 2 ANTIMICROBIAL INCISE DRAPE 6650EZ: Brand: IOBAN™ 2

## (undated) DEVICE — 1200 GUARD II KIT W/5MM TUBE W/O VAC TUBE: Brand: GUARDIAN

## (undated) DEVICE — ELECTRODE,RADIOTRANSLUCENT,FOAM,5PK: Brand: MEDLINE

## (undated) DEVICE — SYR 3ML LL TIP 1/10ML GRAD --

## (undated) DEVICE — PACK PROCEDURE SURG HRT CATH

## (undated) DEVICE — STRYKER PERFORMANCE SERIES SAGITTAL BLADE: Brand: STRYKER PERFORMANCE SERIES

## (undated) DEVICE — BANDAGE COMPR M W6INXL10YD WHT BGE VELC E MTRX HK AND LOOP

## (undated) DEVICE — CEMENT MIXING SYSTEM WITH FEMORAL BREAKWAY NOZZLE: Brand: REVOLUTION

## (undated) DEVICE — PROBE ES TEMP HOT AND CLD FAST ACCURATE SFT FLX CIRCA S CATH

## (undated) DEVICE — POLYLINED TOWEL: Brand: CONVERTORS